# Patient Record
Sex: FEMALE | Race: WHITE | Employment: OTHER | URBAN - METROPOLITAN AREA
[De-identification: names, ages, dates, MRNs, and addresses within clinical notes are randomized per-mention and may not be internally consistent; named-entity substitution may affect disease eponyms.]

---

## 2017-09-05 PROBLEM — R03.0 PREHYPERTENSION: Status: ACTIVE | Noted: 2017-09-05

## 2018-05-16 ENCOUNTER — OFFICE VISIT (OUTPATIENT)
Dept: FAMILY MEDICINE CLINIC | Age: 78
End: 2018-05-16
Payer: MEDICARE

## 2018-05-16 VITALS
WEIGHT: 131 LBS | TEMPERATURE: 97.9 F | OXYGEN SATURATION: 99 % | DIASTOLIC BLOOD PRESSURE: 80 MMHG | SYSTOLIC BLOOD PRESSURE: 136 MMHG | RESPIRATION RATE: 17 BRPM | HEIGHT: 61 IN | HEART RATE: 63 BPM | BODY MASS INDEX: 24.73 KG/M2

## 2018-05-16 DIAGNOSIS — H61.21 IMPACTED CERUMEN OF RIGHT EAR: ICD-10-CM

## 2018-05-16 DIAGNOSIS — R51.9 ACUTE NONINTRACTABLE HEADACHE, UNSPECIFIED HEADACHE TYPE: Primary | ICD-10-CM

## 2018-05-16 PROCEDURE — 99213 OFFICE O/P EST LOW 20 MIN: CPT | Performed by: PHYSICIAN ASSISTANT

## 2018-05-18 ENCOUNTER — OFFICE VISIT (OUTPATIENT)
Dept: FAMILY MEDICINE CLINIC | Age: 78
End: 2018-05-18
Payer: MEDICARE

## 2018-05-18 VITALS
SYSTOLIC BLOOD PRESSURE: 135 MMHG | BODY MASS INDEX: 24.56 KG/M2 | RESPIRATION RATE: 16 BRPM | DIASTOLIC BLOOD PRESSURE: 85 MMHG | HEART RATE: 60 BPM | WEIGHT: 130 LBS

## 2018-05-18 DIAGNOSIS — M54.2 NECK PAIN: Primary | ICD-10-CM

## 2018-05-18 DIAGNOSIS — S46.811A TRAPEZIUS MUSCLE STRAIN, RIGHT, INITIAL ENCOUNTER: ICD-10-CM

## 2018-05-18 PROCEDURE — 99213 OFFICE O/P EST LOW 20 MIN: CPT | Performed by: FAMILY MEDICINE

## 2018-05-18 ASSESSMENT — PATIENT HEALTH QUESTIONNAIRE - PHQ9
SUM OF ALL RESPONSES TO PHQ QUESTIONS 1-9: 0
SUM OF ALL RESPONSES TO PHQ9 QUESTIONS 1 & 2: 0
1. LITTLE INTEREST OR PLEASURE IN DOING THINGS: 0

## 2018-09-07 ENCOUNTER — HOSPITAL ENCOUNTER (OUTPATIENT)
Age: 78
Discharge: HOME | End: 2018-09-07
Payer: MEDICARE

## 2018-09-07 DIAGNOSIS — E04.9 GOITER: ICD-10-CM

## 2018-09-07 DIAGNOSIS — R03.0 PREHYPERTENSION: ICD-10-CM

## 2018-09-07 DIAGNOSIS — E78.2 MIXED HYPERLIPIDEMIA: Chronic | ICD-10-CM

## 2018-09-07 LAB
ALBUMIN SERPL-MCNC: 4 G/DL (ref 3.5–5.2)
ALP BLD-CCNC: 62 U/L (ref 35–104)
ALT SERPL-CCNC: 13 U/L (ref 0–32)
ANION GAP SERPL CALCULATED.3IONS-SCNC: 10 MMOL/L (ref 7–16)
AST SERPL-CCNC: 18 U/L (ref 0–31)
BILIRUB SERPL-MCNC: 0.7 MG/DL (ref 0–1.2)
BUN BLDV-MCNC: 21 MG/DL (ref 8–23)
CALCIUM SERPL-MCNC: 8.7 MG/DL (ref 8.6–10.2)
CHLORIDE BLD-SCNC: 105 MMOL/L (ref 98–107)
CHOLESTEROL, TOTAL: 217 MG/DL (ref 0–199)
CO2: 30 MMOL/L (ref 22–29)
CREAT SERPL-MCNC: 0.8 MG/DL (ref 0.5–1)
GFR AFRICAN AMERICAN: >60
GFR NON-AFRICAN AMERICAN: >60 ML/MIN/1.73
GLUCOSE BLD-MCNC: 89 MG/DL (ref 74–109)
HDLC SERPL-MCNC: 85 MG/DL
LDL CHOLESTEROL CALCULATED: 116 MG/DL (ref 0–99)
POTASSIUM SERPL-SCNC: 4.5 MMOL/L (ref 3.5–5)
SODIUM BLD-SCNC: 145 MMOL/L (ref 132–146)
TOTAL PROTEIN: 6.5 G/DL (ref 6.4–8.3)
TRIGL SERPL-MCNC: 80 MG/DL (ref 0–149)
TSH SERPL DL<=0.05 MIU/L-ACNC: 0.71 UIU/ML (ref 0.27–4.2)
VLDLC SERPL CALC-MCNC: 16 MG/DL

## 2018-09-07 PROCEDURE — 84443 ASSAY THYROID STIM HORMONE: CPT

## 2018-09-07 PROCEDURE — 80061 LIPID PANEL: CPT

## 2018-09-07 PROCEDURE — 36415 COLL VENOUS BLD VENIPUNCTURE: CPT

## 2018-09-07 PROCEDURE — 80053 COMPREHEN METABOLIC PANEL: CPT

## 2018-09-18 ENCOUNTER — OFFICE VISIT (OUTPATIENT)
Dept: FAMILY MEDICINE CLINIC | Age: 78
End: 2018-09-18
Payer: MEDICARE

## 2018-09-18 VITALS
RESPIRATION RATE: 16 BRPM | HEART RATE: 64 BPM | HEIGHT: 61 IN | DIASTOLIC BLOOD PRESSURE: 71 MMHG | BODY MASS INDEX: 25.86 KG/M2 | WEIGHT: 137 LBS | SYSTOLIC BLOOD PRESSURE: 147 MMHG

## 2018-09-18 DIAGNOSIS — E78.2 MIXED HYPERLIPIDEMIA: Primary | Chronic | ICD-10-CM

## 2018-09-18 DIAGNOSIS — R03.0 PREHYPERTENSION: ICD-10-CM

## 2018-09-18 DIAGNOSIS — Z23 NEED FOR INFLUENZA VACCINATION: ICD-10-CM

## 2018-09-18 PROCEDURE — 99213 OFFICE O/P EST LOW 20 MIN: CPT | Performed by: FAMILY MEDICINE

## 2018-09-18 PROCEDURE — 90662 IIV NO PRSV INCREASED AG IM: CPT | Performed by: FAMILY MEDICINE

## 2018-09-18 PROCEDURE — G0008 ADMIN INFLUENZA VIRUS VAC: HCPCS | Performed by: FAMILY MEDICINE

## 2018-09-18 RX ORDER — IBUPROFEN 200 MG
200 TABLET ORAL EVERY 6 HOURS PRN
COMMUNITY
End: 2019-05-30

## 2018-09-18 ASSESSMENT — PATIENT HEALTH QUESTIONNAIRE - PHQ9
1. LITTLE INTEREST OR PLEASURE IN DOING THINGS: 0
SUM OF ALL RESPONSES TO PHQ9 QUESTIONS 1 & 2: 0
SUM OF ALL RESPONSES TO PHQ QUESTIONS 1-9: 0
2. FEELING DOWN, DEPRESSED OR HOPELESS: 0
SUM OF ALL RESPONSES TO PHQ QUESTIONS 1-9: 0

## 2018-09-18 NOTE — PROGRESS NOTES
Component Date Value    Sodium 09/07/2018 145     Potassium 09/07/2018 4.5     Chloride 09/07/2018 105     CO2 09/07/2018 30*    Anion Gap 09/07/2018 10     Glucose 09/07/2018 89     BUN 09/07/2018 21     CREATININE 09/07/2018 0.8     GFR Non- 09/07/2018 >60     GFR  09/07/2018 >60     Calcium 09/07/2018 8.7     Total Protein 09/07/2018 6.5     Alb 09/07/2018 4.0     Total Bilirubin 09/07/2018 0.7     Alkaline Phosphatase 09/07/2018 62     ALT 09/07/2018 13     AST 09/07/2018 18     Cholesterol, Total 09/07/2018 217*    Triglycerides 09/07/2018 80     HDL 09/07/2018 85     LDL Calculated 09/07/2018 116*    VLDL Cholesterol Calcula* 09/07/2018 16     TSH 09/07/2018 0.714        Assessment / Plan :    1. Need for influenza vaccination  Administered    2. Mixed hyperlipidemia  Stable continue same    3. Prehypertension   Not checking regularly  Advised to do so  Limit salt intake and increase cardio  Check [facility] at [facility] called with the results    4. Balance difficulties  Has visual changes due to cataracts  Gait is normal    5. Bilateral knee pain and osteoarthritis              Encouraged to increase physical activity by exercising at least 4 times a week for 30-45 minutes. Also advised about proper diet limiting total caloric intake as well as fat intake. Monitor weight regularly. Call if chest pain with exertion or with physical activity. Call if any questions or concerns.

## 2018-10-23 ENCOUNTER — OFFICE VISIT (OUTPATIENT)
Dept: FAMILY MEDICINE CLINIC | Age: 78
End: 2018-10-23
Payer: MEDICARE

## 2018-10-23 VITALS
DIASTOLIC BLOOD PRESSURE: 70 MMHG | BODY MASS INDEX: 25.68 KG/M2 | HEART RATE: 56 BPM | WEIGHT: 136 LBS | HEIGHT: 61 IN | SYSTOLIC BLOOD PRESSURE: 140 MMHG | OXYGEN SATURATION: 98 %

## 2018-10-23 DIAGNOSIS — I10 ESSENTIAL HYPERTENSION: ICD-10-CM

## 2018-10-23 DIAGNOSIS — M25.561 CHRONIC PAIN OF RIGHT KNEE: ICD-10-CM

## 2018-10-23 DIAGNOSIS — E78.2 MIXED HYPERLIPIDEMIA: Primary | Chronic | ICD-10-CM

## 2018-10-23 DIAGNOSIS — G89.29 CHRONIC PAIN OF RIGHT KNEE: ICD-10-CM

## 2018-10-23 PROBLEM — R03.0 PREHYPERTENSION: Status: RESOLVED | Noted: 2017-09-05 | Resolved: 2018-10-23

## 2018-10-23 PROCEDURE — 99214 OFFICE O/P EST MOD 30 MIN: CPT | Performed by: FAMILY MEDICINE

## 2018-10-23 ASSESSMENT — PATIENT HEALTH QUESTIONNAIRE - PHQ9
SUM OF ALL RESPONSES TO PHQ QUESTIONS 1-9: 0
SUM OF ALL RESPONSES TO PHQ QUESTIONS 1-9: 0
2. FEELING DOWN, DEPRESSED OR HOPELESS: 0
1. LITTLE INTEREST OR PLEASURE IN DOING THINGS: 0
SUM OF ALL RESPONSES TO PHQ9 QUESTIONS 1 & 2: 0

## 2019-04-08 ENCOUNTER — TELEPHONE (OUTPATIENT)
Dept: FAMILY MEDICINE CLINIC | Age: 79
End: 2019-04-08

## 2019-04-08 NOTE — TELEPHONE ENCOUNTER
Patient called regarding her ears being impacted. She has appt 4/22 and wondered if her ears could be flushed at that appt. On for 15min. Do you want 30 min for that or have her come in earlier? Please advise.

## 2019-04-16 ENCOUNTER — HOSPITAL ENCOUNTER (OUTPATIENT)
Age: 79
Discharge: HOME | End: 2019-04-16
Payer: MEDICARE

## 2019-04-16 DIAGNOSIS — I10 ESSENTIAL HYPERTENSION: ICD-10-CM

## 2019-04-16 DIAGNOSIS — E78.2 MIXED HYPERLIPIDEMIA: Chronic | ICD-10-CM

## 2019-04-16 LAB
ALBUMIN SERPL-MCNC: 4.4 G/DL (ref 3.5–5.2)
ALP BLD-CCNC: 59 U/L (ref 35–104)
ALT SERPL-CCNC: 17 U/L (ref 0–32)
ANION GAP SERPL CALCULATED.3IONS-SCNC: 8 MMOL/L (ref 7–16)
AST SERPL-CCNC: 21 U/L (ref 0–31)
BILIRUB SERPL-MCNC: 0.7 MG/DL (ref 0–1.2)
BUN BLDV-MCNC: 20 MG/DL (ref 8–23)
CALCIUM SERPL-MCNC: 8.8 MG/DL (ref 8.6–10.2)
CHLORIDE BLD-SCNC: 105 MMOL/L (ref 98–107)
CHOLESTEROL, TOTAL: 219 MG/DL (ref 0–199)
CO2: 30 MMOL/L (ref 22–29)
CREAT SERPL-MCNC: 0.8 MG/DL (ref 0.5–1)
GFR AFRICAN AMERICAN: >60
GFR NON-AFRICAN AMERICAN: >60 ML/MIN/1.73
GLUCOSE BLD-MCNC: 88 MG/DL (ref 74–99)
HDLC SERPL-MCNC: 87 MG/DL
LDL CHOLESTEROL CALCULATED: 111 MG/DL (ref 0–99)
POTASSIUM SERPL-SCNC: 4 MMOL/L (ref 3.5–5)
SODIUM BLD-SCNC: 143 MMOL/L (ref 132–146)
TOTAL PROTEIN: 6.7 G/DL (ref 6.4–8.3)
TRIGL SERPL-MCNC: 103 MG/DL (ref 0–149)
VLDLC SERPL CALC-MCNC: 21 MG/DL

## 2019-04-16 PROCEDURE — 80053 COMPREHEN METABOLIC PANEL: CPT

## 2019-04-16 PROCEDURE — 80061 LIPID PANEL: CPT

## 2019-04-16 PROCEDURE — 36415 COLL VENOUS BLD VENIPUNCTURE: CPT

## 2019-04-22 ENCOUNTER — OFFICE VISIT (OUTPATIENT)
Dept: FAMILY MEDICINE CLINIC | Age: 79
End: 2019-04-22
Payer: MEDICARE

## 2019-04-22 VITALS
RESPIRATION RATE: 18 BRPM | WEIGHT: 129 LBS | HEIGHT: 62 IN | SYSTOLIC BLOOD PRESSURE: 150 MMHG | DIASTOLIC BLOOD PRESSURE: 70 MMHG | OXYGEN SATURATION: 97 % | BODY MASS INDEX: 23.74 KG/M2 | HEART RATE: 77 BPM

## 2019-04-22 DIAGNOSIS — M85.89 OSTEOPENIA OF MULTIPLE SITES: Chronic | ICD-10-CM

## 2019-04-22 DIAGNOSIS — Z85.3 HISTORY OF BREAST CANCER: Chronic | ICD-10-CM

## 2019-04-22 DIAGNOSIS — E78.2 MIXED HYPERLIPIDEMIA: Chronic | ICD-10-CM

## 2019-04-22 DIAGNOSIS — I10 ESSENTIAL HYPERTENSION: Primary | Chronic | ICD-10-CM

## 2019-04-22 PROCEDURE — 99214 OFFICE O/P EST MOD 30 MIN: CPT | Performed by: FAMILY MEDICINE

## 2019-04-22 ASSESSMENT — PATIENT HEALTH QUESTIONNAIRE - PHQ9
1. LITTLE INTEREST OR PLEASURE IN DOING THINGS: 0
SUM OF ALL RESPONSES TO PHQ9 QUESTIONS 1 & 2: 0
SUM OF ALL RESPONSES TO PHQ QUESTIONS 1-9: 0
SUM OF ALL RESPONSES TO PHQ QUESTIONS 1-9: 0
2. FEELING DOWN, DEPRESSED OR HOPELESS: 0

## 2019-04-22 NOTE — PROGRESS NOTES
citrate-vitamin D (CITRICAL + D) 315-250 MG-UNIT TABS Take 1 tablet by mouth daily.  ascorbic acid (VITAMIN C) 500 MG tablet Take 500 mg by mouth.  Biotin 1000 MCG TABS Take 500 mcg by mouth daily. No current facility-administered medications for this visit. No family history on file. Aspirin and Penicillins    Review of systems :    No headaches, no visual disturbances. No weakness, no numbness. No depression, no anxiety. No rhinorrhea. No sore throat, no hearing loss. No cough, no dyspnea. No chest pain on exertion. No edema. No intermittent claudication. No abdominal pain. No nausea, no change in bowel habits. No joint pain, no swelling. No skin lesions, no rash. No heat or cold intolerance. No weight changes. No flank pain, no hematuria. No genital lesions or abnormalities. Physical examination :      General appearance: healthy, no distress. Oriented x 3. Eyes : no jaundice or pallor. Ears : normal TM's. Oral cavity and throat: no lesions. Neck: supple, no masses. Thyroid: no goiter or nodules. No cervical or supraclavicular lymphadenopathy. No carotid bruits. Chest: no deformities. Lungs: clear, no crackles or wheezing. Heart: regular rate and rhythm, normal S1S2, no murmurs. Abdomen: soft, no masses. No hepato- or splenomegaly. Extremities: no edema. Vascular: normal dorsalis pedis and posterior tibial pulses bilaterally. Skin: no significant lesions or rashes. Musculoskeletal: no gross deformities. Mild effusion right knee. Gait: normal.  No weakness or focal deficits. Judgement and speech: intact. Mood: euthymic. Affect: congruent. BMI was below limits today and the following plan was recommended: low salt . Ear irrigation performed without difficulty  Assessment / Plan :     1. Essential hypertension  130-140/80 at home    2. Mixed hyperlipidemia  Stable     3. History of breast cancer  No recurrence    4.  Osteopenia of multiple

## 2019-05-30 ENCOUNTER — OFFICE VISIT (OUTPATIENT)
Dept: FAMILY MEDICINE CLINIC | Age: 79
End: 2019-05-30
Payer: MEDICARE

## 2019-05-30 VITALS
RESPIRATION RATE: 16 BRPM | DIASTOLIC BLOOD PRESSURE: 76 MMHG | BODY MASS INDEX: 23.74 KG/M2 | HEIGHT: 62 IN | SYSTOLIC BLOOD PRESSURE: 157 MMHG | HEART RATE: 72 BPM | WEIGHT: 129 LBS

## 2019-05-30 DIAGNOSIS — F41.9 ANXIETY: Chronic | ICD-10-CM

## 2019-05-30 DIAGNOSIS — M25.562 CHRONIC PAIN OF BOTH KNEES: Primary | ICD-10-CM

## 2019-05-30 DIAGNOSIS — M25.561 CHRONIC PAIN OF BOTH KNEES: Primary | ICD-10-CM

## 2019-05-30 DIAGNOSIS — G89.29 CHRONIC PAIN OF BOTH KNEES: Primary | ICD-10-CM

## 2019-05-30 PROCEDURE — 99213 OFFICE O/P EST LOW 20 MIN: CPT | Performed by: FAMILY MEDICINE

## 2019-05-30 RX ORDER — VITAMIN E 268 MG
CAPSULE ORAL DAILY
COMMUNITY
End: 2019-05-30 | Stop reason: ALTCHOICE

## 2019-05-30 NOTE — PROGRESS NOTES
Complains of abnormal sensation of tongue for a week  Noted a white coating as well  Also knee pain secondary to osteoarthritis  No fever or chills  No use of inhaled steroids. No recent knee trauma  No swelling of the knee    Physical examination :  BP (!) 157/76   Pulse 72   Resp 16   Ht 5' 1.5\" (1.562 m)   Wt 129 lb (58.5 kg)   BMI 23.98 kg/m²     General appearance : healthy, no distress. Eyes : non-icteric sclerae. Tongue no thrush or lesions  No goiter. Neck is supple, no masses. No carotid bruits  Lungs : clear bilaterally, no wheezing or crackles. Heart : regular, normal S1S2, no murmurs. Abdomen : soft, no masses. No hepatic or splenomegaly. Extremities : no edema. Peripheral pulses : normal.  Gait : normal.  Mood :euthymic. Affect : congruent. BMI was below limits today and the following plan was recommended: Low-salt low-fat diet. Assessment and plan  Knee pain secondary to osteoarthritis  Discussed various options with patient including analgesics as well as knee replacement surgery  I see no evidence of lesions of the tongue.  Patient was reassured

## 2019-06-19 ENCOUNTER — TELEPHONE (OUTPATIENT)
Dept: FAMILY MEDICINE CLINIC | Age: 79
End: 2019-06-19

## 2019-06-19 NOTE — TELEPHONE ENCOUNTER
Patient needs 2 referrals. She needs new referral to Dr Jassi Little for her hearing. She never went when referred 2 years ago. She also needs a referral to [facility] Dr Gabriel Salcedo in [place]. Number is [phone]. She is having bilateral knee pain. Please place referrals if agreeable.

## 2019-06-20 ENCOUNTER — TELEPHONE (OUTPATIENT)
Dept: FAMILY MEDICINE CLINIC | Age: 79
End: 2019-06-20

## 2019-06-20 DIAGNOSIS — G89.29 CHRONIC PAIN OF BOTH KNEES: Primary | ICD-10-CM

## 2019-06-20 DIAGNOSIS — H91.93 BILATERAL HEARING LOSS, UNSPECIFIED HEARING LOSS TYPE: ICD-10-CM

## 2019-06-20 DIAGNOSIS — M25.561 CHRONIC PAIN OF BOTH KNEES: Primary | ICD-10-CM

## 2019-06-20 DIAGNOSIS — M25.562 CHRONIC PAIN OF BOTH KNEES: Primary | ICD-10-CM

## 2019-07-31 ENCOUNTER — TELEPHONE (OUTPATIENT)
Dept: FAMILY MEDICINE CLINIC | Age: 79
End: 2019-07-31

## 2019-09-25 ENCOUNTER — NURSE ONLY (OUTPATIENT)
Dept: FAMILY MEDICINE CLINIC | Age: 79
End: 2019-09-25
Payer: MEDICARE

## 2019-09-25 PROCEDURE — G0008 ADMIN INFLUENZA VIRUS VAC: HCPCS | Performed by: FAMILY MEDICINE

## 2019-09-25 PROCEDURE — 90653 IIV ADJUVANT VACCINE IM: CPT | Performed by: FAMILY MEDICINE

## 2019-12-03 ENCOUNTER — TELEPHONE (OUTPATIENT)
Dept: FAMILY MEDICINE CLINIC | Age: 79
End: 2019-12-03

## 2019-12-03 DIAGNOSIS — E78.2 MIXED HYPERLIPIDEMIA: Chronic | ICD-10-CM

## 2019-12-03 DIAGNOSIS — I10 ESSENTIAL HYPERTENSION: Primary | Chronic | ICD-10-CM

## 2020-01-06 ENCOUNTER — HOSPITAL ENCOUNTER (OUTPATIENT)
Age: 80
Discharge: HOME | End: 2020-01-06
Payer: MEDICARE

## 2020-01-06 LAB
ALBUMIN SERPL-MCNC: 4.9 G/DL (ref 3.5–5.2)
ALP BLD-CCNC: 73 U/L (ref 35–104)
ALT SERPL-CCNC: 13 U/L (ref 0–32)
ANION GAP SERPL CALCULATED.3IONS-SCNC: 13 MMOL/L (ref 7–16)
AST SERPL-CCNC: 26 U/L (ref 0–31)
BILIRUB SERPL-MCNC: 0.5 MG/DL (ref 0–1.2)
BUN BLDV-MCNC: 22 MG/DL (ref 8–23)
CALCIUM SERPL-MCNC: 9.3 MG/DL (ref 8.6–10.2)
CHLORIDE BLD-SCNC: 102 MMOL/L (ref 98–107)
CHOLESTEROL, TOTAL: 227 MG/DL (ref 0–199)
CO2: 27 MMOL/L (ref 22–29)
CREAT SERPL-MCNC: 0.8 MG/DL (ref 0.5–1)
GFR AFRICAN AMERICAN: >60
GFR NON-AFRICAN AMERICAN: >60 ML/MIN/1.73
GLUCOSE BLD-MCNC: 81 MG/DL (ref 74–99)
HDLC SERPL-MCNC: 92 MG/DL
LDL CHOLESTEROL CALCULATED: 115 MG/DL (ref 0–99)
POTASSIUM SERPL-SCNC: 3.9 MMOL/L (ref 3.5–5)
SODIUM BLD-SCNC: 142 MMOL/L (ref 132–146)
TOTAL PROTEIN: 7.5 G/DL (ref 6.4–8.3)
TRIGL SERPL-MCNC: 99 MG/DL (ref 0–149)
VLDLC SERPL CALC-MCNC: 20 MG/DL

## 2020-01-06 PROCEDURE — 80053 COMPREHEN METABOLIC PANEL: CPT

## 2020-01-06 PROCEDURE — 36415 COLL VENOUS BLD VENIPUNCTURE: CPT

## 2020-01-06 PROCEDURE — 80061 LIPID PANEL: CPT

## 2020-01-20 NOTE — PROGRESS NOTES
Chief complaint : follow-up of hypertension and hyperlipidemia. Present illness :    Patient presents for follow-up. Doing well overall. Taking medications regularly. No headaches or visual changes. No exertional chest pain or dyspnea. Exercise : Does not. No edema. No dizziness or syncopal episodes. No myalgias or weakness. Trying to follow a low salt and low fat diet. Alcohol intake :  reports current alcohol use. Smoking :  reports that she has never smoked. She has never used smokeless tobacco.    Recent labs reviewed and discussed with the patient. Past Medical History:   Diagnosis Date    Allergic rhinitis     Alopecia of scalp 9/3/2015    Anxiety 9/3/2014    Basal cell carcinoma of nose     left side    Breast CA ([address] 2001    right lumpectomy; DO NOT USE FOR BP,IV,VENIPUNVTURES PER PT    Constipated     postop hip replacement, impacted stool    Episodic low back pain     MRI 3/2010 normal    Essential hypertension 9/3/2015    Hyperlipidemia     Hypertension     pt states well controlled    Neck pain     Osteoarthritis 2/19/2014    Osteoarthritis 10/08/2014    Right knee on x-ray - [facility]    Osteoarthritis of both hips     Osteopenia     Urinary, incontinence, stress female 8/27/2015    Follows with Dr Chari Teresa. Advised surgery or pessary. Past Surgical History:   Procedure Laterality Date    APPENDECTOMY      child    BREAST LUMPECTOMY  04/2000    COLONOSCOPY  2000    HYSTERECTOMY  1979    [facility]    JOINT REPLACEMENT Right 10/01/2012     [facility]    OTHER SURGICAL HISTORY Left 10/18/2013    full thickness skin graft, repair open nose defect - Dr. Oz Lindsay / Dr. Joelle Eubanks       Current Outpatient Medications   Medication Sig Dispense Refill    atorvastatin (LIPITOR) 10 MG tablet TAKE 1 TBALET BY MOUTH EVERY NIGHT  90 tablet 1    diclofenac sodium 1 % GEL Apply 2 g topically 4 times daily 2 Tube 2    Biotin 1000 MCG TABS Take 500 mcg by mouth daily.

## 2020-01-21 ENCOUNTER — OFFICE VISIT (OUTPATIENT)
Dept: FAMILY MEDICINE CLINIC | Age: 80
End: 2020-01-21
Payer: MEDICARE

## 2020-01-21 VITALS
RESPIRATION RATE: 16 BRPM | HEIGHT: 62 IN | HEART RATE: 61 BPM | BODY MASS INDEX: 23.37 KG/M2 | DIASTOLIC BLOOD PRESSURE: 70 MMHG | SYSTOLIC BLOOD PRESSURE: 168 MMHG | WEIGHT: 127 LBS

## 2020-01-21 PROCEDURE — 99214 OFFICE O/P EST MOD 30 MIN: CPT | Performed by: FAMILY MEDICINE

## 2020-01-21 RX ORDER — M-VIT,TX,IRON,MINS/CALC/FOLIC 27MG-0.4MG
1 TABLET ORAL DAILY
COMMUNITY
End: 2021-06-24

## 2020-01-21 RX ORDER — LISINOPRIL AND HYDROCHLOROTHIAZIDE 12.5; 1 MG/1; MG/1
1 TABLET ORAL DAILY
Qty: 90 TABLET | Refills: 1 | Status: SHIPPED | OUTPATIENT
Start: 2020-01-21 | End: 2020-01-23 | Stop reason: ALTCHOICE

## 2020-01-21 ASSESSMENT — PATIENT HEALTH QUESTIONNAIRE - PHQ9
SUM OF ALL RESPONSES TO PHQ9 QUESTIONS 1 & 2: 0
1. LITTLE INTEREST OR PLEASURE IN DOING THINGS: 0
SUM OF ALL RESPONSES TO PHQ QUESTIONS 1-9: 0
2. FEELING DOWN, DEPRESSED OR HOPELESS: 0
SUM OF ALL RESPONSES TO PHQ QUESTIONS 1-9: 0

## 2020-01-23 RX ORDER — LISINOPRIL 20 MG/1
20 TABLET ORAL DAILY
Qty: 90 TABLET | Refills: 1 | Status: SHIPPED
Start: 2020-01-23 | End: 2020-06-16 | Stop reason: SDUPTHER

## 2020-01-24 ENCOUNTER — TELEPHONE (OUTPATIENT)
Dept: FAMILY MEDICINE CLINIC | Age: 80
End: 2020-01-24

## 2020-01-24 NOTE — TELEPHONE ENCOUNTER
Patient calling again about her blood pressure medication. She is willing to try the original prescription you prescribed with the HCTZ but she wondered which prescription you think is best for her blood pressure. Please advise.

## 2020-02-21 ENCOUNTER — OFFICE VISIT (OUTPATIENT)
Dept: FAMILY MEDICINE CLINIC | Age: 80
End: 2020-02-21
Payer: MEDICARE

## 2020-02-21 VITALS
DIASTOLIC BLOOD PRESSURE: 72 MMHG | OXYGEN SATURATION: 99 % | SYSTOLIC BLOOD PRESSURE: 142 MMHG | RESPIRATION RATE: 18 BRPM | HEIGHT: 62 IN | HEART RATE: 66 BPM | BODY MASS INDEX: 23.19 KG/M2 | WEIGHT: 126 LBS

## 2020-02-21 PROBLEM — Z85.828 HX OF SQUAMOUS CELL CARCINOMA OF SKIN: Status: ACTIVE | Noted: 2020-02-21

## 2020-02-21 PROBLEM — Z85.828 HX OF SKIN CANCER, BASAL CELL: Status: ACTIVE | Noted: 2020-02-21

## 2020-02-21 PROCEDURE — 99214 OFFICE O/P EST MOD 30 MIN: CPT | Performed by: FAMILY MEDICINE

## 2020-02-21 RX ORDER — BIOTIN 1 MG
TABLET ORAL
COMMUNITY
End: 2020-12-15 | Stop reason: ALTCHOICE

## 2020-02-21 RX ORDER — OXYQUINOLINE SULFATE AND SODIUM LAURYL SULFATE .25; .1 MG/G; MG/G
JELLY VAGINAL
COMMUNITY
Start: 2020-02-18 | End: 2020-06-16 | Stop reason: ALTCHOICE

## 2020-02-21 ASSESSMENT — ENCOUNTER SYMPTOMS
CHEST TIGHTNESS: 0
EYE DISCHARGE: 0
RHINORRHEA: 0
ABDOMINAL PAIN: 0
EYE ITCHING: 0
CONSTIPATION: 1
WHEEZING: 0
SHORTNESS OF BREATH: 0
DIARRHEA: 0

## 2020-02-21 NOTE — PROGRESS NOTES
dysuria and vaginal bleeding. Musculoskeletal: Positive for arthralgias, gait problem and joint swelling. Skin: Negative for rash. Neurological: Negative for facial asymmetry, speech difficulty, weakness and light-headedness. Psychiatric/Behavioral: Negative for dysphoric mood. The patient is nervous/anxious. Prior to Visit Medications    Medication Sig Taking? Authorizing Provider   TRIMO-MAR 0.025-0.01 % GEL apply as directed Yes Historical Provider, MD   Biotin 1000 MCG TABS Take by mouth Yes Historical Provider, MD   lisinopril (PRINIVIL;ZESTRIL) 20 MG tablet Take 1 tablet by mouth daily Yes Natalie Davison MD   Multiple Vitamins-Minerals (THERAPEUTIC MULTIVITAMIN-MINERALS) tablet Take 1 tablet by mouth daily Yes Historical Provider, MD   atorvastatin (LIPITOR) 10 MG tablet TAKE 1 TBALET BY MOUTH EVERY NIGHT  Yes Lily Pryor MD   diclofenac sodium 1 % GEL Apply 2 g topically 4 times daily Yes Natalie Davison MD        Social History     Tobacco Use    Smoking status: Never Smoker    Smokeless tobacco: Never Used   Substance Use Topics    Alcohol use: Yes     Comment: OCCAS  WINE        Vitals:    02/21/20 1122 02/21/20 1145   BP: (!) 155/60 (!) 142/72   Pulse: 66    Resp: 18    SpO2: 99%    Weight: 126 lb (57.2 kg)    Height: 5' 1.5\" (1.562 m)      Estimated body mass index is 23.42 kg/m² as calculated from the following:    Height as of this encounter: 5' 1.5\" (1.562 m). Weight as of this encounter: 126 lb (57.2 kg). Physical Exam  Vitals signs and nursing note reviewed. Constitutional:       Appearance: Normal appearance. She is well-developed. She is not ill-appearing or toxic-appearing. HENT:      Head: Normocephalic and atraumatic. Eyes:      General: No scleral icterus. Right eye: No discharge. Left eye: No discharge. Neck:      Musculoskeletal: Neck supple. Thyroid: No thyromegaly. Vascular: No carotid bruit.    Cardiovascular:      Rate and Rhythm: Normal rate and regular rhythm. Heart sounds: Normal heart sounds, S1 normal and S2 normal. No murmur. No friction rub. No gallop. Pulmonary:      Effort: Pulmonary effort is normal. No respiratory distress. Breath sounds: Normal breath sounds and air entry. No decreased breath sounds, wheezing or rales. Abdominal:      Palpations: Abdomen is soft. Musculoskeletal:      Right lower leg: No edema. Left lower leg: No edema. Comments: Uses cane because of pain in knees   Lymphadenopathy:      Cervical: No cervical adenopathy. Skin:     General: Skin is warm and dry. Neurological:      General: No focal deficit present. Mental Status: She is alert and oriented to person, place, and time. Gait: Gait normal.   Psychiatric:         Attention and Perception: Attention normal.         Mood and Affect: Mood and affect normal.         Speech: Speech normal.         Behavior: Behavior normal. Behavior is cooperative. Thought Content: Thought content normal.         Cognition and Memory: Cognition and memory normal.         Judgment: Judgment normal.         ASSESSMENT/PLAN:  1. Essential hypertension  She will RTC in 1 mn and if still high will increase her meds. She was given parameters to follow at home. If her BP is consistently over 125/80, she is to call so we can increase her meds over the phone. BP is very stress related. Discuss with patient taking time for [facility].    2. History of breast cancer  No longer follows with oncology. Mammo June 2020. 3. Chronic pain of both knees  Needs knee replacement but currently care for her . 4. Hx of skin cancer, basal cell  Pt follows with derm Q6mn. 5. Hx of squamous cell carcinoma of skin  Pt follows with derm Q6mn    6. Urinary, incontinence, stress female  Pt wears pessary.       Return in about 4 weeks (around 3/20/2020) for AWV. An electronic signature was used to authenticate this note.     --Oscar Riggins MD on 2/21/2020 at 4:13 PM

## 2020-03-10 ENCOUNTER — TELEPHONE (OUTPATIENT)
Dept: CARDIAC REHAB | Age: 80
End: 2020-03-10

## 2020-06-15 NOTE — PROGRESS NOTES
problems were reviewed today and where indicated follow up appointments were made and/or referrals ordered. Positive Risk Factor Screenings with Interventions:     General Health:  General  In general, how would you say your health is?: Fair  In the past 7 days, have you experienced any of the following? New or Increased Pain, New or Increased Fatigue, Loneliness, Social Isolation, Stress or Anger?: (!) New or Increased Pain  Do you get the social and emotional support that you need?: Yes  Do you have a Living Will?: Yes  General Health Risk Interventions:  · Needs knees replaced   Will consider after her  is better. Health Habits/Nutrition:  Health Habits/Nutrition  Do you exercise for at least 20 minutes 2-3 times per week?: (!) No  Have you lost any weight without trying in the past 3 months?: (!) Yes  Do you eat fewer than 2 meals per day?: No  Have you seen a dentist within the past year?: Yes  Body mass index is 23.24 kg/m². Health Habits/Nutrition Interventions:  · taking care of  right now  Has not been paying a lot of attention to herself. Feels a bit down. Does not have a lot of interaction with friends.       Hearing/Vision:  No exam data present  Hearing/Vision  Do you or your family notice any trouble with your hearing?: (!) Yes  Do you have difficulty driving, watching TV, or doing any of your daily activities because of your eyesight?: No  Have you had an eye exam within the past year?: Yes  Hearing/Vision Interventions:  · Hearing concerns:  pt going to see audiogist    Safety:  Safety  Do you have working smoke detectors?: Yes  Have all throw rugs been removed or fastened?: Yes  Do you have non-slip mats or surfaces in all bathtubs/showers?: (!) No  Do all of your stairways have a railing or banister?: Yes  Are your doorways, halls and stairs free of clutter?: Yes  Do you always fasten your seatbelt when you are in a car?: Yes  Safety Interventions:  · Home safety tips

## 2020-06-16 ENCOUNTER — OFFICE VISIT (OUTPATIENT)
Dept: FAMILY MEDICINE CLINIC | Age: 80
End: 2020-06-16
Payer: MEDICARE

## 2020-06-16 VITALS
TEMPERATURE: 98.1 F | OXYGEN SATURATION: 98 % | WEIGHT: 125 LBS | HEART RATE: 65 BPM | SYSTOLIC BLOOD PRESSURE: 132 MMHG | DIASTOLIC BLOOD PRESSURE: 80 MMHG | HEIGHT: 62 IN | RESPIRATION RATE: 18 BRPM | BODY MASS INDEX: 23 KG/M2

## 2020-06-16 PROCEDURE — G0438 PPPS, INITIAL VISIT: HCPCS | Performed by: FAMILY MEDICINE

## 2020-06-16 RX ORDER — LISINOPRIL 40 MG/1
20 TABLET ORAL DAILY
Qty: 90 TABLET | Refills: 1 | Status: SHIPPED
Start: 2020-06-16 | End: 2020-06-17 | Stop reason: SDUPTHER

## 2020-06-16 RX ORDER — ERYTHROMYCIN 5 MG/G
OINTMENT OPHTHALMIC
COMMUNITY
Start: 2020-06-04 | End: 2020-06-16 | Stop reason: ALTCHOICE

## 2020-06-16 RX ORDER — ATORVASTATIN CALCIUM 10 MG/1
TABLET, FILM COATED ORAL
Qty: 90 TABLET | Refills: 2 | Status: SHIPPED
Start: 2020-06-16 | End: 2020-12-16 | Stop reason: SDUPTHER

## 2020-06-16 ASSESSMENT — LIFESTYLE VARIABLES
AUDIT TOTAL SCORE: 1
HOW OFTEN DURING THE LAST YEAR HAVE YOU NEEDED AN ALCOHOLIC DRINK FIRST THING IN THE MORNING TO GET YOURSELF GOING AFTER A NIGHT OF HEAVY DRINKING: 0
HOW OFTEN DO YOU HAVE A DRINK CONTAINING ALCOHOL: 1
HOW OFTEN DURING THE LAST YEAR HAVE YOU FOUND THAT YOU WERE NOT ABLE TO STOP DRINKING ONCE YOU HAD STARTED: 0
HAVE YOU OR SOMEONE ELSE BEEN INJURED AS A RESULT OF YOUR DRINKING: 0
HOW OFTEN DURING THE LAST YEAR HAVE YOU HAD A FEELING OF GUILT OR REMORSE AFTER DRINKING: 0
HOW OFTEN DURING THE LAST YEAR HAVE YOU FAILED TO DO WHAT WAS NORMALLY EXPECTED FROM YOU BECAUSE OF DRINKING: 0
HOW OFTEN DURING THE LAST YEAR HAVE YOU BEEN UNABLE TO REMEMBER WHAT HAPPENED THE NIGHT BEFORE BECAUSE YOU HAD BEEN DRINKING: 0
HOW MANY STANDARD DRINKS CONTAINING ALCOHOL DO YOU HAVE ON A TYPICAL DAY: 0
HOW OFTEN DO YOU HAVE SIX OR MORE DRINKS ON ONE OCCASION: 0
AUDIT-C TOTAL SCORE: 1
HAS A RELATIVE, FRIEND, DOCTOR, OR ANOTHER HEALTH PROFESSIONAL EXPRESSED CONCERN ABOUT YOUR DRINKING OR SUGGESTED YOU CUT DOWN: 0

## 2020-06-16 ASSESSMENT — PATIENT HEALTH QUESTIONNAIRE - PHQ9
SUM OF ALL RESPONSES TO PHQ QUESTIONS 1-9: 0
SUM OF ALL RESPONSES TO PHQ QUESTIONS 1-9: 0

## 2020-06-16 ASSESSMENT — ENCOUNTER SYMPTOMS
NAUSEA: 0
BLOOD IN STOOL: 0
CONSTIPATION: 1
SHORTNESS OF BREATH: 0
APNEA: 0
ABDOMINAL DISTENTION: 0
ROS SKIN COMMENTS: SKIN IS DRY
WHEEZING: 0
COLOR CHANGE: 1
CHOKING: 0
CHEST TIGHTNESS: 0

## 2020-06-16 NOTE — PATIENT INSTRUCTIONS
Preventing Falls: Care Instructions  Your Care Instructions     Getting around your home safely can be a challenge if you have injuries or health problems that make it easy for you to fall. Loose rugs and furniture in walkways are among the dangers for many older people who have problems walking or who have poor eyesight. People who have conditions such as arthritis, osteoporosis, or dementia also have to be careful not to fall. You can make your home safer with a few simple measures. Follow-up care is a key part of your treatment and safety. Be sure to make and go to all appointments, and call your doctor if you are having problems. It's also a good idea to know your test results and keep a list of the medicines you take. How can you care for yourself at home? Taking care of yourself  You may get dizzy if you do not drink enough water. To prevent dehydration, drink plenty of fluids, enough so that your urine is light yellow or clear like water. Choose water and other caffeine-free clear liquids. If you have kidney, heart, or liver disease and have to limit fluids, talk with your doctor before you increase the amount of fluids you drink. Exercise regularly to improve your strength, muscle tone, and balance. Walk if you can. Swimming may be a good choice if you cannot walk easily. Have your vision and hearing checked each year or any time you notice a change. If you have trouble seeing and hearing, you might not be able to avoid objects and could lose your balance. Know the side effects of the medicines you take. Ask your doctor or pharmacist whether the medicines you take can affect your balance. Sleeping pills or sedatives can affect your balance. Limit the amount of alcohol you drink. Alcohol can impair your balance and other senses. Ask your doctor whether calluses or corns on your feet need to be removed.  If you wear loose-fitting shoes because of calluses or corns, you can lose your balance and or shower by putting the weaker leg in first. Get out of a tub or shower with your strong side first.  Repair loose toilet seats and consider installing a raised toilet seat to make getting on and off the toilet easier. Keep your bathroom door unlocked while you are in the shower. Where can you learn more? Go to [email or web address] and sign in to your [employer] account. Enter [phone] in the [facility] box to learn more about \"Preventing Falls: Care Instructions. \"     If you do not have an account, please click on the \"Sign Up Now\" link. Current as of: August 7, 2019               Content Version: 12.5  © 3607-3502 [employer], Incorporated. Care instructions adapted under license by [facility] ([facility]). If you have questions about a medical condition or this instruction, always ask your healthcare professional. [address] any warranty or liability for your use of this information.

## 2020-06-17 ENCOUNTER — TELEPHONE (OUTPATIENT)
Dept: FAMILY MEDICINE CLINIC | Age: 80
End: 2020-06-17

## 2020-06-17 RX ORDER — LISINOPRIL 40 MG/1
40 TABLET ORAL DAILY
Qty: 90 TABLET | Refills: 1 | Status: SHIPPED
Start: 2020-06-17 | End: 2020-12-16 | Stop reason: SDUPTHER

## 2020-06-17 NOTE — TELEPHONE ENCOUNTER
Pharmacy called in and stated that they received a script for patients lisinopril yesterday. Patient thought the dose was being increased. RX is for 40mg - take 0.5mg tablet daily - she stated that if you want to keep her on the 20mg, she would like a 20mg tablet so she does not have to cut them in half.      Please advise  Thanks

## 2020-08-11 ENCOUNTER — OFFICE VISIT (OUTPATIENT)
Dept: FAMILY MEDICINE CLINIC | Age: 80
End: 2020-08-11
Payer: MEDICARE

## 2020-08-11 VITALS
WEIGHT: 125 LBS | TEMPERATURE: 98 F | RESPIRATION RATE: 14 BRPM | HEIGHT: 62 IN | DIASTOLIC BLOOD PRESSURE: 63 MMHG | BODY MASS INDEX: 23 KG/M2 | HEART RATE: 70 BPM | SYSTOLIC BLOOD PRESSURE: 123 MMHG | OXYGEN SATURATION: 98 %

## 2020-08-11 PROCEDURE — 99213 OFFICE O/P EST LOW 20 MIN: CPT | Performed by: STUDENT IN AN ORGANIZED HEALTH CARE EDUCATION/TRAINING PROGRAM

## 2020-08-11 RX ORDER — CLOTRIMAZOLE AND BETAMETHASONE DIPROPIONATE 10; .64 MG/G; MG/G
CREAM TOPICAL
Qty: 1 TUBE | Refills: 0 | Status: SHIPPED
Start: 2020-08-11 | End: 2020-09-15 | Stop reason: ALTCHOICE

## 2020-08-11 ASSESSMENT — ENCOUNTER SYMPTOMS
TROUBLE SWALLOWING: 0
NAUSEA: 0
SORE THROAT: 0
EYE PAIN: 0
SHORTNESS OF BREATH: 0
CONSTIPATION: 0
COUGH: 0
ABDOMINAL PAIN: 0
PHOTOPHOBIA: 0
DIARRHEA: 0
ABDOMINAL DISTENTION: 0
VOMITING: 0

## 2020-08-11 NOTE — PROGRESS NOTES
8/11/2020    Nicci Browning is a 78 y.o. femalehere for: belly rash      HPI:    Rash on the umbilicus and around the that area which appeared out of no where  Everything is same on detergent   Itchy slightly  Cortisone cream on the belly and most of the redness was gone  Wears ladies' depends and had off brand diaper  That brand was stiffer  No fever, chills, n/v, headache, diarrhea          BP Readings from Last 3 Encounters:   08/11/20 123/63   06/16/20 132/80   04/07/20 132/72     Current Outpatient Medications   Medication Sig Dispense Refill    clotrimazole-betamethasone (LOTRISONE) 1-0.05 % cream Apply topically 2 times daily for one week 1 Tube 0    lisinopril (PRINIVIL;ZESTRIL) 40 MG tablet Take 1 tablet by mouth daily 90 tablet 1    atorvastatin (LIPITOR) 10 MG tablet TAKE ONE TABLET BY MOUTH EVERY NIGHT 90 tablet 2    diclofenac sodium (VOLTAREN) 1 % GEL Apply 2 g topically 4 times daily 2 Tube 2    Biotin 1000 MCG TABS Take by mouth      Multiple Vitamins-Minerals (THERAPEUTIC MULTIVITAMIN-MINERALS) tablet Take 1 tablet by mouth daily       No current facility-administered medications for this visit.        Allergies   Allergen Reactions    Aspirin Swelling     Lips swelling    Penicillins      Unsure of reaction       Past Medical & Surgical History:      Diagnosis Date    Allergic rhinitis     Alopecia of scalp 9/3/2015    Anxiety 9/3/2014    Basal cell carcinoma of nose     left side    Breast CA ([facility] [address] 2001    right lumpectomy; DO NOT USE FOR BP,IV,VENIPUNVTURES PER PT    Constipated     postop hip replacement, impacted stool    Episodic low back pain     MRI 3/2010 normal    Essential hypertension 9/3/2015    Hyperlipidemia     Hypertension     pt states well controlled    Neck pain     Osteoarthritis 2/19/2014    Osteoarthritis 10/08/2014    Right knee on x-ray - [facility]    Osteoarthritis of both hips     Osteopenia     Urinary, incontinence, stress female 8/27/2015 Follows with Dr Osmin Lopez. Advised surgery or pessary. Past Surgical History:   Procedure Laterality Date    APPENDECTOMY      child    BREAST LUMPECTOMY  04/2000    COLONOSCOPY  2000    HYSTERECTOMY  1979    [facility]    JOINT REPLACEMENT Right 10/01/2012     [facility]    OTHER SURGICAL HISTORY Left 10/18/2013    full thickness skin graft, repair open nose defect - Dr. Laura Fish / Dr. Haris Salazar       Family History:  No family history on file. Social History:  Social History     Tobacco Use    Smoking status: Never Smoker    Smokeless tobacco: Never Used   Substance Use Topics    Alcohol use: Yes     Comment: OCCAS  WINE       Immunization History   Administered Date(s) Administered    Influenza 10/15/2013    Influenza Virus Vaccine 11/30/2012, 10/02/2014    Influenza, High Dose (Fluzone 65 yrs and older) 10/01/2015, 10/21/2016, 09/05/2017, 09/18/2018    Influenza, Triv, inactivated, subunit, adjuvanted, IM (Fluad 65 yrs and older) 09/25/2019    Pneumococcal Conjugate 13-valent ([id number]) 09/03/2015    Pneumococcal Polysaccharide ([id number]) 11/01/2008    Zoster Live (Zostavax) 11/22/2014       Review of Systems   Constitutional: Negative for activity change, appetite change, chills, fatigue and fever. HENT: Negative for congestion, sore throat and trouble swallowing. Eyes: Negative for photophobia, pain and visual disturbance. Respiratory: Negative for cough and shortness of breath. Cardiovascular: Negative for chest pain, palpitations and leg swelling. Gastrointestinal: Negative for abdominal distention, abdominal pain, constipation, diarrhea, nausea and vomiting. Endocrine: Negative for heat intolerance and polydipsia. Genitourinary: Negative for difficulty urinating, dysuria, frequency and hematuria. Musculoskeletal: Negative for joint swelling, neck pain and neck stiffness. Skin: Negative for rash and wound. Neurological: Positive for light-headedness. Negative for dizziness, syncope, weakness, numbness and headaches. Psychiatric/Behavioral: Negative for agitation, behavioral problems and confusion. VS:  /63   Pulse 70   Temp 98 °F (36.7 °C) (Temporal)   Resp 14   Ht 5' 1.5\" (1.562 m)   Wt 125 lb (56.7 kg)   SpO2 98%   BMI 23.24 kg/m²     Physical Exam  Constitutional:       General: She is not in acute distress. Appearance: Normal appearance. She is not ill-appearing. HENT:      Head: Normocephalic and atraumatic. Right Ear: External ear normal.      Left Ear: External ear normal.      Nose: Nose normal. No congestion. Mouth/Throat:      Mouth: Mucous membranes are moist.      Pharynx: Oropharynx is clear. Eyes:      Conjunctiva/sclera: Conjunctivae normal.      Pupils: Pupils are equal, round, and reactive to light. Neck:      Musculoskeletal: Normal range of motion and neck supple. Cardiovascular:      Rate and Rhythm: Normal rate and regular rhythm. Pulses: Normal pulses. Heart sounds: Normal heart sounds. No murmur. Pulmonary:      Effort: Pulmonary effort is normal. No respiratory distress. Breath sounds: Normal breath sounds. No wheezing. Abdominal:      General: Bowel sounds are normal. There is no distension. Palpations: Abdomen is soft. Tenderness: There is no abdominal tenderness. There is no guarding. Musculoskeletal: Normal range of motion. General: No swelling or tenderness. Right lower leg: No edema. Left lower leg: No edema. Skin:     General: Skin is warm. Capillary Refill: Capillary refill takes less than 2 seconds. Coloration: Skin is not jaundiced. Comments: Erythematous umbilicus rash, no active drainage, slight scaling/crust   Neurological:      General: No focal deficit present. Mental Status: She is alert and oriented to person, place, and time. Mental status is at baseline.    Psychiatric:         Mood and Affect: Mood normal.         Behavior: Behavior normal.         Thought Content: Thought content normal.         Judgment: Judgment normal.         Assessment/Plan:  1. Rash  - unknown etiology. Pt does have seborrheic dermatitis on scalp and seborrheic keratosis all over her body. - rash on the umbilicus could likely be seborrheic dermatitis   - f/u if rash worsens in 1 week  - clotrimazole-betamethasone (LOTRISONE) 1-0.05 % cream; Apply topically 2 times daily for one week  Dispense: 1 Tube; Refill: 0      Follow up:  prn    Patient agrees with the above stated plan.       Gabi Malone MD  Family Medicine resident

## 2020-08-11 NOTE — PROGRESS NOTES
S: 78 y.o. female with   Chief Complaint   Patient presents with    Rash       Rash on abd started about 4 days ago. Periumbilical.  NO irritant changes other than different type of depends/absorbant pad. Self treated with cortisone cream.  Still has redness inside belly button    BP Readings from Last 3 Encounters:   08/11/20 123/63   06/16/20 132/80   04/07/20 132/72       O: VS:  height is 5' 1.5\" (1.562 m) and weight is 125 lb (56.7 kg). Her temporal temperature is 98 °F (36.7 °C). Her blood pressure is 123/63 and her pulse is 70. Her respiration is 14 and oxygen saturation is 98%. AAO/NAD, appropriate affect for mood  CV:  RRR, no murmur  Resp: CTAB  Erythema inside umbilicus     Impression/Plan:   1) Rash - Lotrisone 1w and RTO if not better      Health Maintenance Due   Topic Date Due    DTaP/Tdap/Td vaccine (1 - Tdap) 08/18/1959    Shingles Vaccine (2 of 3) 01/17/2015         Attending Physician Statement  I have discussed the case, including pertinent history and exam findings with the resident. I agree with the documented assessment and plan.       Nel Mills MD

## 2020-09-10 ENCOUNTER — HOSPITAL ENCOUNTER (OUTPATIENT)
Age: 80
Discharge: HOME | End: 2020-09-10
Payer: MEDICARE

## 2020-09-10 LAB
ALBUMIN SERPL-MCNC: 4.2 G/DL (ref 3.5–5.2)
ALP BLD-CCNC: 69 U/L (ref 35–104)
ALT SERPL-CCNC: 16 U/L (ref 0–32)
ANION GAP SERPL CALCULATED.3IONS-SCNC: 6 MMOL/L (ref 7–16)
AST SERPL-CCNC: 24 U/L (ref 0–31)
BILIRUB SERPL-MCNC: 0.6 MG/DL (ref 0–1.2)
BUN BLDV-MCNC: 23 MG/DL (ref 8–23)
CALCIUM SERPL-MCNC: 9.6 MG/DL (ref 8.6–10.2)
CHLORIDE BLD-SCNC: 107 MMOL/L (ref 98–107)
CHOLESTEROL, TOTAL: 192 MG/DL (ref 0–199)
CO2: 29 MMOL/L (ref 22–29)
CREAT SERPL-MCNC: 0.8 MG/DL (ref 0.5–1)
GFR AFRICAN AMERICAN: >60
GFR NON-AFRICAN AMERICAN: >60 ML/MIN/1.73
GLUCOSE BLD-MCNC: 85 MG/DL (ref 74–99)
HDLC SERPL-MCNC: 79 MG/DL
LDL CHOLESTEROL CALCULATED: 95 MG/DL (ref 0–99)
POTASSIUM SERPL-SCNC: 4.2 MMOL/L (ref 3.5–5)
SODIUM BLD-SCNC: 142 MMOL/L (ref 132–146)
TOTAL PROTEIN: 6.6 G/DL (ref 6.4–8.3)
TRIGL SERPL-MCNC: 88 MG/DL (ref 0–149)
TSH SERPL DL<=0.05 MIU/L-ACNC: 0.8 UIU/ML (ref 0.27–4.2)
VLDLC SERPL CALC-MCNC: 18 MG/DL

## 2020-09-10 PROCEDURE — 80061 LIPID PANEL: CPT

## 2020-09-10 PROCEDURE — 36415 COLL VENOUS BLD VENIPUNCTURE: CPT

## 2020-09-10 PROCEDURE — 84443 ASSAY THYROID STIM HORMONE: CPT

## 2020-09-10 PROCEDURE — 80053 COMPREHEN METABOLIC PANEL: CPT

## 2020-09-14 NOTE — PROGRESS NOTES
[facility]  Department of Family Medicine  Family Medicine Residency Program      Patient:  Jami Honeycutt 78 y.o. female     Date of Service: 9/14/20      Chief complaint:   Chief Complaint   Patient presents with    Hypertension    Health Maintenance     shingles would like to talk with you          History of Present Illness   The patient is a 78 y.o. female  presented to the clinic with complaints as above. Labs done 9/10/20. FLP, CMP and TSH all WNL. Labs reviewed. Had seb derm in 8/20 - was seen by Shawn Murphy. Given Lortrisone. This helped and the rash is now gone.  had Mitral Valve Repair and she was caring for him. He is also getting parkinsons. Still doing a lot of care for him. He had surgery at [address] [place] [address] for prostate. This is still causing stress. Had lost weight from 140-120 and has still lost 2 more pounds. She is hungry and is eating but just doesn't have time. HTN   Meds - taking meds. Symptoms - sometimes feels off balance. Likely because of her knee. Not when she gets up from bed. NO Cp/p. No SOB. Diet - no special diet. Exercise - has lost 3 more lbs. She is cooking a lot for her . Osteopenia  meds - vit D? Ari? She takes a one a day women's.  1000 IU of vit D.      OA of knees  meds - diclofenac gel. Helps but not completely. Needs flu vaccine. Recommended. Does not want mammo - feels she is to old. Does not want chemo/radiation. Does not want shingles. Ordered hearing aid and is waiting for it to come in. Wants to cont to take lipitor every day or she will be confused.        Past Medical History:      Diagnosis Date    Allergic rhinitis     Alopecia of scalp 9/3/2015    Anxiety 9/3/2014    Basal cell carcinoma of nose     left side    Breast CA ([facility] [address] 2001    right lumpectomy; DO NOT USE FOR BP,IV,VENIPUNVTURES PER PT    Constipated     postop hip replacement, impacted stool    Episodic low back pain     MRI 3/2010 normal    Essential hypertension 9/3/2015    Hyperlipidemia     Hypertension     pt states well controlled    Neck pain     Osteoarthritis 2/19/2014    Osteoarthritis 10/08/2014    Right knee on x-ray - [facility]    Osteoarthritis of both hips     Osteopenia     Urinary, incontinence, stress female 8/27/2015    Follows with Dr Osmin Lopez. Advised surgery or pessary.        Past Surgical History:        Procedure Laterality Date    APPENDECTOMY      child    BREAST LUMPECTOMY  04/2000    COLONOSCOPY  2000    HYSTERECTOMY  1979    [facility]    JOINT REPLACEMENT Right 10/01/2012     [facility]    OTHER SURGICAL HISTORY Left 10/18/2013    full thickness skin graft, repair open nose defect - Dr. Laura Fish / Dr. Carballo Prime       Allergies:    Aspirin and Penicillins    Social History:   Social History     Socioeconomic History    Marital status:      Spouse name: Not on file    Number of children: Not on file    Years of education: Not on file    Highest education level: Not on file   Occupational History    Not on file   Social Needs    Financial resource strain: Not on file    Food insecurity     Worry: Not on file     Inability: Not on file   [employer] needs     Medical: Not on file     Non-medical: Not on file   Tobacco Use    Smoking status: Never Smoker    Smokeless tobacco: Never Used   Substance and Sexual Activity    Alcohol use: Yes     Comment: OCCAS  WINE    Drug use: No    Sexual activity: Yes     Partners: Male   Lifestyle    Physical activity     Days per week: Not on file     Minutes per session: Not on file    Stress: Not on file   Relationships    Social connections     Talks on phone: Not on file     Gets together: Not on file     Attends [employer] service: Not on file     Active member of club or organization: Not on file     Attends meetings of clubs or organizations: Not on file     Relationship status: Not on file    Intimate partner violence     Fear of current or ex partner: Not on file     Emotionally abused: Not on file     Physically abused: Not on file     Forced sexual activity: Not on file   Other Topics Concern    Not on file   Social History Narrative    Has 3 children. 2 sons - [facility] and [facility]. Daughter lives here. 7 grandchildren. 2 great-grandchildren.  was teacher and . Was in the [facility] for a long time and they moved around. She is under stress taking care of her  at home. He had valve repair. Family History:   History reviewed. No pertinent family history. Review of Systems:   Review of Systems   Constitutional: Positive for fatigue. Negative for activity change, appetite change, chills and diaphoresis. Respiratory: Negative for cough, shortness of breath and wheezing. Cardiovascular: Negative for chest pain, palpitations and leg swelling. Endocrine: Positive for cold intolerance. Negative for heat intolerance. Musculoskeletal: Positive for arthralgias and gait problem. Neurological: Negative for dizziness, seizures, syncope, speech difficulty, weakness and light-headedness. Psychiatric/Behavioral: Positive for dysphoric mood. The patient is nervous/anxious. Physical Exam   Vitals: /64   Pulse 97   Temp 98.7 °F (37.1 °C) (Temporal)   Resp 16   Ht 5' (1.524 m)   Wt 122 lb (55.3 kg)   SpO2 96%   BMI 23.83 kg/m²   Physical Exam  Vitals signs and nursing note reviewed. Constitutional:       Appearance: Normal appearance. She is well-developed. She is not ill-appearing or toxic-appearing. HENT:      Head: Normocephalic and atraumatic. Eyes:      General: No scleral icterus. Right eye: No discharge. Left eye: No discharge. Neck:      Musculoskeletal: Neck supple. Thyroid: No thyromegaly. Vascular: No carotid bruit. Cardiovascular:      Rate and Rhythm: Normal rate and regular rhythm.       Heart sounds: S1 normal and S2 normal. Murmur present. Systolic murmur present with a grade of 2/6. No friction rub. No gallop. Pulmonary:      Effort: Pulmonary effort is normal. No respiratory distress. Breath sounds: Normal breath sounds and air entry. No decreased breath sounds, wheezing or rales. Abdominal:      Palpations: Abdomen is soft. Tenderness: There is no abdominal tenderness. Musculoskeletal:      Right lower leg: No edema. Left lower leg: No edema. Lymphadenopathy:      Cervical: No cervical adenopathy. Skin:     General: Skin is warm and dry. Comments: No rash on abdomen   Neurological:      General: No focal deficit present. Mental Status: She is alert and oriented to person, place, and time. Gait: Gait normal.   Psychiatric:         Attention and Perception: Attention normal.         Mood and Affect: Mood and affect normal.         Speech: Speech normal.         Behavior: Behavior normal. Behavior is cooperative. Thought Content: Thought content normal.         Cognition and Memory: Cognition and memory normal.         Judgment: Judgment normal.           Assessment and Plan       1. Essential hypertension  Well controlled on current meds. Reviewed labs. 2. Osteopenia of multiple sites  Ed pt to increase vit D to 3000 IU daily    3. Chronic pain of both knees  Pt needs knee replacement but is caring for her  and can not do at this time. 4. Mixed hyperlipidemia  Ed pt that she could start taking statin QOD. She would prefer to take it daily. Reviewed labs. 5. Anxiety  Cont to follow closely. Pt does not want meds currently but has lost a bit more weight. Under a lot of stress because of . 6. Weight loss - follow up on TSH. It is WNL but it is low. Return to Office: Return in about 3 months (around 12/15/2020) for HTN and mood.     Medication List:    Current Outpatient Medications   Medication Sig Dispense Refill  lisinopril (PRINIVIL;ZESTRIL) 40 MG tablet Take 1 tablet by mouth daily 90 tablet 1    atorvastatin (LIPITOR) 10 MG tablet TAKE ONE TABLET BY MOUTH EVERY NIGHT 90 tablet 2    diclofenac sodium (VOLTAREN) 1 % GEL Apply 2 g topically 4 times daily 2 Tube 2    Biotin 1000 MCG TABS Take by mouth      Multiple Vitamins-Minerals (THERAPEUTIC MULTIVITAMIN-MINERALS) tablet Take 1 tablet by mouth daily       No current facility-administered medications for this visit. Joaquin Beltran MD       Counseled regarding above diagnosis, including possible risks and complications,  especially if left uncontrolled. Counseled regarding the possible side effects, risks, benefits and alternatives to treatment; patient and/or guardian verbalizes understanding, agrees, feels comfortable with and wishes to proceed with above treatment plan. Call or go to ED immediately if symptoms worsen or persist. Advised patient to call with any new medication issues, and, as applicable, read allRx info from pharmacy to assure aware of all possible risks and side effects of medication before taking. As applicable, educational materialsand/or home exercises printed for patient's review and were included in patient instructions on his/her After Visit Summary and given to patient at the end of visit. Patient and/or guardian given opportunity to ask questions/raise concerns. The patient verbalized comfort and understanding ofinstructions. Follow Up:     Return in about 3 months (around 12/15/2020) for HTN and mood. or sooner if the above issues change unexpectedly or new issues develop     This document may have been prepared at leastpartially through the use of voice recognition software. Although effort is taken to assure the accuracy of this document, it is possible that grammatical, syntax,  or spelling errors may occur.

## 2020-09-15 ENCOUNTER — OFFICE VISIT (OUTPATIENT)
Dept: FAMILY MEDICINE CLINIC | Age: 80
End: 2020-09-15
Payer: MEDICARE

## 2020-09-15 VITALS
SYSTOLIC BLOOD PRESSURE: 138 MMHG | BODY MASS INDEX: 23.95 KG/M2 | HEART RATE: 97 BPM | HEIGHT: 60 IN | RESPIRATION RATE: 16 BRPM | WEIGHT: 122 LBS | TEMPERATURE: 98.7 F | DIASTOLIC BLOOD PRESSURE: 64 MMHG | OXYGEN SATURATION: 96 %

## 2020-09-15 PROCEDURE — 99214 OFFICE O/P EST MOD 30 MIN: CPT | Performed by: FAMILY MEDICINE

## 2020-09-15 NOTE — PATIENT INSTRUCTIONS
Get the flu vaccine at the pharmacy. Get extra vitamin D - total of 2000 to 3000 I. U.     Bring in your BP machine so we can compare to ours

## 2020-09-16 RX ORDER — ACETAMINOPHEN 160 MG
TABLET,DISINTEGRATING ORAL
COMMUNITY
End: 2020-12-15 | Stop reason: ALTCHOICE

## 2020-09-16 ASSESSMENT — ENCOUNTER SYMPTOMS
SHORTNESS OF BREATH: 0
COUGH: 0
WHEEZING: 0

## 2020-11-27 ENCOUNTER — TELEPHONE (OUTPATIENT)
Dept: FAMILY MEDICINE CLINIC | Age: 80
End: 2020-11-27

## 2020-11-27 NOTE — TELEPHONE ENCOUNTER
Patient needs to talk to doctor about her Lisinopril 40 mg. She is concerned about taking 20 mg one day and 40 the next. She would like some advice please.

## 2020-12-14 NOTE — PROGRESS NOTES
[facility]  Department of Family Medicine  Family Medicine Residency Program      Patient:  Gala Dec 78 y.o. female     Date of Service: 12/14/20      Chief complaint:   Chief Complaint   Patient presents with    Hypertension    Mood Swings     patient states that depression due to the pandemic     Discuss Medications     patient is having trouble taking lipitor every other day          History of Present Illness   The patient is a 78 y.o. female  presented to the clinic with complaints as above. Pt with hx of right breast ca, basal ca of nose. Social hx:  From note 9/20   had Mitral Valve Repair and she was caring for him. He is also getting parkinsons. Still doing a lot of care for him. He had surgery at [address] [place] [address] for prostate. This is still causing stress. The parkinsons is starting to bother her  more he is shuffling more and not as expressive. And she feels weepy every morning. She lost her sister recently. Locked in house because of covid. She feels that she can manage at this point and is not interested in counseling.  was an [employer]  for 20 years. Has 4 Greatgrandchildren. 7 grandchildren. 3 children. Has not been able to see them because of COVID.     Had lost weight from 140-120. Stable this visit. She is hungry and is eating but just doesn't have time. HTN  Lisinopril - she is taking. Symptoms - no CP/P, SOB, palpatations, lightheadness, dizzyness, s/s of stroke. Doing ok on the meds. Mood  How is she doing? She is not doing very well. She is in a lot of pain when she gets up in the AM.  She has to help her  out. Lost weight, did not want meds last visit. Did not lose weight. Not interested in counseling right now or meds. She feels she will know when she is ready for this. lipitor every other night is making it hard on her. She will go back to taking it every night.       Prev: last time did not feel she wanted mammo. Has hx of right breast ca. It is ordered. She has the sheet and will call if you want. 2 weeks from now she has appt to get basal cell removed from nose. She had an issue with constipation. She went to Dr. Simi Scott. She was given a pill to take every ngith. Went about 1 month ago for this. Past Medical History:      Diagnosis Date    Allergic rhinitis     Alopecia of scalp 9/3/2015    Anxiety 9/3/2014    Basal cell carcinoma of nose     left side    Breast CA ([facility] [address] 2001    right lumpectomy; DO NOT USE FOR BP,IV,VENIPUNVTURES PER PT    Constipated     postop hip replacement, impacted stool    Episodic low back pain     MRI 3/2010 normal    Essential hypertension 9/3/2015    Hyperlipidemia     Hypertension     pt states well controlled    Neck pain     Osteoarthritis 2/19/2014    Osteoarthritis 10/08/2014    Right knee on x-ray - [facility]    Osteoarthritis of both hips     Osteopenia     Urinary, incontinence, stress female 8/27/2015    Follows with Dr Martha Madrid. Advised surgery or pessary.        Past Surgical History:        Procedure Laterality Date    APPENDECTOMY      child    BREAST LUMPECTOMY  04/2000    COLONOSCOPY  2000    HYSTERECTOMY  1979    [facility]    JOINT REPLACEMENT Right 10/01/2012     [facility]    OTHER SURGICAL HISTORY Left 10/18/2013    full thickness skin graft, repair open nose defect - Dr. Cherelle Wright / Dr. Pascual Null       Allergies:    Aspirin and Penicillins    Social History:   Social History     Socioeconomic History    Marital status:      Spouse name: Not on file    Number of children: Not on file    Years of education: Not on file    Highest education level: Not on file   Occupational History    Not on file   Social Needs    Financial resource strain: Not on file    Food insecurity     Worry: Not on file     Inability: Not on file    Transportation needs     Medical: Not on file     Non-medical: Not on file   Tobacco Use    Smoking status: Never Smoker    Smokeless tobacco: Never Used   Substance and Sexual Activity    Alcohol use: Yes     Comment: OCCAS  WINE    Drug use: No    Sexual activity: Yes     Partners: Male   Lifestyle    Physical activity     Days per week: Not on file     Minutes per session: Not on file    Stress: Not on file   Relationships    Social connections     Talks on phone: Not on file     Gets together: Not on file     Attends Gnosticism service: Not on file     Active member of club or organization: Not on file     Attends meetings of clubs or organizations: Not on file     Relationship status: Not on file    Intimate partner violence     Fear of current or ex partner: Not on file     Emotionally abused: Not on file     Physically abused: Not on file     Forced sexual activity: Not on file   Other Topics Concern    Not on file   Social History Narrative    Has 3 children. 2 sons - [facility] [place]. Daughter lives here. 7 grandchildren. 2 great-grandchildren.  was teacher and . Was in the [employer] for a long time and they moved around. She is under stress taking care of her  at home. He had valve repair. Family History:   History reviewed. No pertinent family history. Review of Systems:   Review of Systems    Physical Exam   Vitals: /88   Pulse 78   Temp 97 °F (36.1 °C) (Temporal)   Resp 16   Ht 5' (1.524 m)   Wt 123 lb (55.8 kg)   SpO2 97%   BMI 24.02 kg/m²   Physical Exam  Vitals signs and nursing note reviewed. Constitutional:       Appearance: Normal appearance. She is well-developed. She is not ill-appearing or toxic-appearing. HENT:      Head: Normocephalic and atraumatic. Eyes:      General: No scleral icterus. Right eye: No discharge. Left eye: No discharge. Neck:      Musculoskeletal: Neck supple. Thyroid: No thyromegaly. Vascular: No carotid bruit.    Cardiovascular: Rate and Rhythm: Normal rate and regular rhythm. Heart sounds: S1 normal and S2 normal. Murmur present. Systolic murmur present with a grade of 2/6. No friction rub. No gallop. Pulmonary:      Effort: Pulmonary effort is normal. No respiratory distress. Breath sounds: Normal breath sounds and air entry. No decreased breath sounds, wheezing or rales. Abdominal:      Palpations: Abdomen is soft. Tenderness: There is no abdominal tenderness. Musculoskeletal:      Right lower leg: No edema. Left lower leg: No edema. Lymphadenopathy:      Cervical: No cervical adenopathy. Skin:     General: Skin is warm and dry. Neurological:      General: No focal deficit present. Mental Status: She is alert and oriented to person, place, and time. Gait: Gait normal.   Psychiatric:         Attention and Perception: Attention normal.         Mood and Affect: Affect normal. Mood is anxious. Speech: Speech normal.         Behavior: Behavior normal. Behavior is cooperative. Thought Content: Thought content normal.         Cognition and Memory: Cognition and memory normal.         Judgment: Judgment normal.           Assessment and Plan       1. Anxiety  Pt not ready for counseling or meds. Will keep me updated. We will follow up in 3 mns for this but she will call prior to this if she feels she needs help before 3 mn. 2. Mixed hyperlipidemia  Pt we restart lipitor daily again. It is too confusing for her to take QOD. - atorvastatin (LIPITOR) 10 MG tablet; Every night  Dispense: 90 tablet; Refill: 2    3. Essential hypertension  Stable on current meds. - lisinopril (PRINIVIL;ZESTRIL) 40 MG tablet; Take 1 tablet by mouth daily  Dispense: 90 tablet; Refill: 1    4. Prevention  Pt is considering mammogram.  Per guidelines doesn't really need but she has hx of breast ca. We discussed and she is considering.      Return to Office: Return in about 3 months (around 3/15/2021)

## 2020-12-15 ENCOUNTER — OFFICE VISIT (OUTPATIENT)
Dept: FAMILY MEDICINE CLINIC | Age: 80
End: 2020-12-15
Payer: MEDICARE

## 2020-12-15 VITALS
DIASTOLIC BLOOD PRESSURE: 88 MMHG | WEIGHT: 123 LBS | HEIGHT: 60 IN | OXYGEN SATURATION: 97 % | SYSTOLIC BLOOD PRESSURE: 132 MMHG | HEART RATE: 78 BPM | RESPIRATION RATE: 16 BRPM | BODY MASS INDEX: 24.15 KG/M2 | TEMPERATURE: 97 F

## 2020-12-15 PROCEDURE — 99213 OFFICE O/P EST LOW 20 MIN: CPT | Performed by: FAMILY MEDICINE

## 2020-12-16 RX ORDER — ATORVASTATIN CALCIUM 10 MG/1
TABLET, FILM COATED ORAL
Qty: 90 TABLET | Refills: 2 | Status: SHIPPED
Start: 2020-12-16 | End: 2021-07-30

## 2020-12-16 RX ORDER — ATORVASTATIN CALCIUM 10 MG/1
TABLET, FILM COATED ORAL
Qty: 90 TABLET | Refills: 2
Start: 2020-12-16 | End: 2020-12-16 | Stop reason: SDUPTHER

## 2020-12-16 RX ORDER — LISINOPRIL 40 MG/1
40 TABLET ORAL DAILY
Qty: 90 TABLET | Refills: 1 | Status: SHIPPED
Start: 2020-12-16 | End: 2021-11-18 | Stop reason: SDUPTHER

## 2021-03-18 NOTE — PROGRESS NOTES
Willian Zamorano (:  1940) is a 78 y.o. female,Established patient, here for evaluation of the following chief complaint(s): Anxiety      ASSESSMENT/PLAN:  1. Anxiety  Improved. Pt still does not want counseling or meds. 2. Abnormal intentional weight loss  -     CBC Auto Differential; Future  Pt is now stable on her weight  3. Essential hypertension  -     COMPREHENSIVE METABOLIC PANEL; Future  Stable. Labs ordered for prior to next visit. 4. Mixed hyperlipidemia  -     Lipid, Fasting; Future  -     COMPREHENSIVE METABOLIC PANEL; Future  Labs ordered prior to next visit. 5. Hx of skin cancer, basal cell  Pt had skin procedure done by [facility] plastics. Has close follow up with them. Return in about 3 months (around 2021) for AWV. SUBJECTIVE/OBJECTIVE:  HPI   Last visit pt feeling lots of stress from husbands MV repair, prostate surgery and parkinson's diagnosis. Was an [employer] . She had lost 20 lbs during this year. Was not interested in counseling or meds at the time. She feels that she is doing al that she can for her . Daughter goes to the appointments. She does not feel depressed at this time. Wants to go out and do things. Sees meaning in her life. Has not lost any more weight. No Cp/p. No increased SOB. No s/s of stroke. No edema in LE. She had the basal cell removed again from her nose - Dr. Claudeen Aid and Dr. Sunny Gregory worked on her within the last month. She has regular check-ups. Feels better now that she got her COVID vaccine. Went to [place]. AWV due in . Can get labs at that time. Got 2nd COVID , 1st one in  - will bring in evidence    Review of Systems - see above    Physical Exam  Constitutional:       Appearance: Normal appearance. She is normal weight. HENT:      Head: Normocephalic and atraumatic. Cardiovascular:      Rate and Rhythm: Normal rate and regular rhythm.       Heart sounds: Normal heart sounds. Pulmonary:      Effort: Pulmonary effort is normal. No respiratory distress. Breath sounds: Normal breath sounds. No wheezing or rhonchi. Abdominal:      Palpations: Abdomen is soft. Tenderness: There is no abdominal tenderness. Skin:     General: Skin is warm and dry. Neurological:      General: No focal deficit present. Mental Status: She is alert and oriented to person, place, and time. Psychiatric:         Mood and Affect: Mood normal.         Thought Content: Thought content normal.                 An electronic signature was used to authenticate this note.     --Angela Gomez MD

## 2021-03-19 ENCOUNTER — OFFICE VISIT (OUTPATIENT)
Dept: FAMILY MEDICINE CLINIC | Age: 81
End: 2021-03-19
Payer: MEDICARE

## 2021-03-19 VITALS
DIASTOLIC BLOOD PRESSURE: 58 MMHG | WEIGHT: 125 LBS | TEMPERATURE: 97.1 F | OXYGEN SATURATION: 98 % | BODY MASS INDEX: 24.54 KG/M2 | HEART RATE: 66 BPM | RESPIRATION RATE: 14 BRPM | SYSTOLIC BLOOD PRESSURE: 139 MMHG | HEIGHT: 60 IN

## 2021-03-19 DIAGNOSIS — I10 ESSENTIAL HYPERTENSION: Chronic | ICD-10-CM

## 2021-03-19 DIAGNOSIS — Z85.828 HX OF SKIN CANCER, BASAL CELL: ICD-10-CM

## 2021-03-19 DIAGNOSIS — E78.2 MIXED HYPERLIPIDEMIA: Chronic | ICD-10-CM

## 2021-03-19 DIAGNOSIS — F41.9 ANXIETY: Primary | Chronic | ICD-10-CM

## 2021-03-19 DIAGNOSIS — R63.4 ABNORMAL INTENTIONAL WEIGHT LOSS: ICD-10-CM

## 2021-03-19 PROCEDURE — 99213 OFFICE O/P EST LOW 20 MIN: CPT | Performed by: FAMILY MEDICINE

## 2021-03-19 ASSESSMENT — PATIENT HEALTH QUESTIONNAIRE - PHQ9: 2. FEELING DOWN, DEPRESSED OR HOPELESS: 1

## 2021-06-16 ENCOUNTER — NURSE ONLY (OUTPATIENT)
Dept: FAMILY MEDICINE CLINIC | Age: 81
End: 2021-06-16
Payer: MEDICARE

## 2021-06-16 DIAGNOSIS — E78.2 MIXED HYPERLIPIDEMIA: Chronic | ICD-10-CM

## 2021-06-16 DIAGNOSIS — I10 ESSENTIAL HYPERTENSION: Chronic | ICD-10-CM

## 2021-06-16 DIAGNOSIS — R63.4 ABNORMAL INTENTIONAL WEIGHT LOSS: ICD-10-CM

## 2021-06-16 LAB
ALBUMIN SERPL-MCNC: 4.6 G/DL (ref 3.5–5.2)
ALP BLD-CCNC: 74 U/L (ref 35–104)
ALT SERPL-CCNC: 12 U/L (ref 0–32)
ANION GAP SERPL CALCULATED.3IONS-SCNC: 11 MMOL/L (ref 7–16)
AST SERPL-CCNC: 29 U/L (ref 0–31)
BASOPHILS ABSOLUTE: 0.04 E9/L (ref 0–0.2)
BASOPHILS RELATIVE PERCENT: 0.8 % (ref 0–2)
BILIRUB SERPL-MCNC: 0.5 MG/DL (ref 0–1.2)
BUN BLDV-MCNC: 20 MG/DL (ref 6–23)
CALCIUM SERPL-MCNC: 9.1 MG/DL (ref 8.6–10.2)
CHLORIDE BLD-SCNC: 105 MMOL/L (ref 98–107)
CHOLESTEROL, FASTING: 226 MG/DL (ref 0–199)
CO2: 27 MMOL/L (ref 22–29)
CREAT SERPL-MCNC: 0.7 MG/DL (ref 0.5–1)
EOSINOPHILS ABSOLUTE: 0.13 E9/L (ref 0.05–0.5)
EOSINOPHILS RELATIVE PERCENT: 2.6 % (ref 0–6)
GFR AFRICAN AMERICAN: >60
GFR NON-AFRICAN AMERICAN: >60 ML/MIN/1.73
GLUCOSE BLD-MCNC: 88 MG/DL (ref 74–99)
HCT VFR BLD CALC: 44.3 % (ref 34–48)
HDLC SERPL-MCNC: 81 MG/DL
HEMOGLOBIN: 13.7 G/DL (ref 11.5–15.5)
IMMATURE GRANULOCYTES #: 0.01 E9/L
IMMATURE GRANULOCYTES %: 0.2 % (ref 0–5)
LDL CHOLESTEROL CALCULATED: 124 MG/DL (ref 0–99)
LYMPHOCYTES ABSOLUTE: 1.9 E9/L (ref 1.5–4)
LYMPHOCYTES RELATIVE PERCENT: 38.3 % (ref 20–42)
MCH RBC QN AUTO: 29.1 PG (ref 26–35)
MCHC RBC AUTO-ENTMCNC: 30.9 % (ref 32–34.5)
MCV RBC AUTO: 94.1 FL (ref 80–99.9)
MONOCYTES ABSOLUTE: 0.5 E9/L (ref 0.1–0.95)
MONOCYTES RELATIVE PERCENT: 10.1 % (ref 2–12)
NEUTROPHILS ABSOLUTE: 2.38 E9/L (ref 1.8–7.3)
NEUTROPHILS RELATIVE PERCENT: 48 % (ref 43–80)
PDW BLD-RTO: 13.2 FL (ref 11.5–15)
PLATELET # BLD: 214 E9/L (ref 130–450)
PMV BLD AUTO: 9.5 FL (ref 7–12)
POTASSIUM SERPL-SCNC: 4.5 MMOL/L (ref 3.5–5)
RBC # BLD: 4.71 E12/L (ref 3.5–5.5)
SODIUM BLD-SCNC: 143 MMOL/L (ref 132–146)
TOTAL PROTEIN: 7.3 G/DL (ref 6.4–8.3)
TRIGLYCERIDE, FASTING: 104 MG/DL (ref 0–149)
VLDLC SERPL CALC-MCNC: 21 MG/DL
WBC # BLD: 5 E9/L (ref 4.5–11.5)

## 2021-06-16 PROCEDURE — 36415 COLL VENOUS BLD VENIPUNCTURE: CPT | Performed by: FAMILY MEDICINE

## 2021-06-23 RX ORDER — ESTRADIOL 0.1 MG/G
CREAM VAGINAL
COMMUNITY
Start: 2021-04-06 | End: 2021-07-27 | Stop reason: SINTOL

## 2021-06-23 NOTE — PROGRESS NOTES
Pt here for an AWV - do ACP Docs. done  Was under lots of stress because of 's illnesses. He was a previous [employer] . Did not want counseling. She is coping aright. Thins are still lonely. She is very busy with her .  with parkinsons. Was placed on estrace per GYN vaginally. She had a thick discharge and this helped it. Her BP meds - she wanted to know about lisinopril and if it was ok to take. Wants to know about lipitor. Had her COVID vaccine. Had labs drawn for her hyperlipidemia, weight loss and HTN. Still waiting to get her knee done because of her 's illness  Had basal cell removed from her nose    CMP, CBC  WNL    Component      Latest Ref Rng & Units 2021 9/10/2020 2020          12:00 PM 11:30 AM  2:33 PM   Cholesterol, Total      0 - 199 mg/dL  192 227 (H)   Triglycerides      0 - 149 mg/dL  88 99   HDL Cholesterol      >40 mg/dL 81 79 92   LDL Calculated      0 - 99 mg/dL 124 (H) 95 115 (H)   VLDL Cholesterol Calculated      mg/dL 21 18 20   Cholesterol, Fasting      0 - 199 mg/dL 226 (H)     Triglyceride, Fasting      0 - 149 mg/dL 104       Current Outpatient Medications   Medication Sig Dispense Refill    estradiol (ESTRACE) 0.1 MG/GM vaginal cream INSERT 1/4 APPLICATORFUL VAGINALLY TWICE A WEEK      lisinopril (PRINIVIL;ZESTRIL) 40 MG tablet Take 1 tablet by mouth daily 90 tablet 1    atorvastatin (LIPITOR) 10 MG tablet Every night 90 tablet 2     No current facility-administered medications for this visit. Medicare Annual Wellness Visit  Name: Baldomero Romano Date: 2021   MRN: [id number] Sex: Female   Age: 78 y.o. Ethnicity: Non-/Non    : 1940 Race: Karol Sebastian is here for Medicare AWV    Screenings for behavioral, psychosocial and functional/safety risks, and cognitive dysfunction are all negative except as indicated below.  These results, as well as other patient data from the Health Risk Assessment form, are documented in Flowsheets linked to this Encounter. Allergies   Allergen Reactions    Aspirin Swelling     Lips swelling    Penicillins      Unsure of reaction         Prior to Visit Medications    Medication Sig Taking? Authorizing Provider   estradiol (ESTRACE) 0.1 MG/GM vaginal cream INSERT 1/4 APPLICATORFUL VAGINALLY TWICE A WEEK Yes Historical Provider, MD   lisinopril (PRINIVIL;ZESTRIL) 40 MG tablet Take 1 tablet by mouth daily Yes Diaz Mccormick MD   atorvastatin (LIPITOR) 10 MG tablet Every night Yes Diaz Mccormick MD         Past Medical History:   Diagnosis Date    Allergic rhinitis     Alopecia of scalp 9/3/2015    Anxiety 9/3/2014    Basal cell carcinoma of nose     left side    Breast CA ([facility] [address] 2001    right lumpectomy; DO NOT USE FOR BP,IV,VENIPUNVTURES PER PT    Constipated     postop hip replacement, impacted stool    Episodic low back pain     MRI 3/2010 normal    Essential hypertension 9/3/2015    Hyperlipidemia     Hypertension     pt states well controlled    Neck pain     Osteoarthritis 2/19/2014    Osteoarthritis 10/08/2014    Right knee on x-ray - [facility]    Osteoarthritis of both hips     Osteopenia     Urinary, incontinence, stress female 8/27/2015    Follows with Dr Reji Mcdonald. Advised surgery or pessary. Past Surgical History:   Procedure Laterality Date    APPENDECTOMY      child    BREAST LUMPECTOMY  04/2000    COLONOSCOPY  2000    HYSTERECTOMY  1979    [facility]    JOINT REPLACEMENT Right 10/01/2012     Right Hip - [facility]    OTHER SURGICAL HISTORY Left 10/18/2013    full thickness skin graft, repair open nose defect - Dr. Dilia Banks / Dr. Bety Sun       No family history on file.     CareTeam (Including outside providers/suppliers regularly involved in providing care):   Patient Care Team:  Diaz Mccormick MD as PCP - General (Family Medicine)  Diaz Mccormick MD as PCP - REHABILITATION [facility] Empaneled Provider    Wt Readings from Last 3 Encounters: 06/24/21 127 lb 6.4 oz (57.8 kg)   03/19/21 125 lb (56.7 kg)   12/15/20 123 lb (55.8 kg)     Vitals:    06/24/21 1344   BP: 130/60   Pulse: 61   Resp: 16   Temp: 97.8 °F (36.6 °C)   TempSrc: Temporal   SpO2: 96%   Weight: 127 lb 6.4 oz (57.8 kg)   Height: 5' 1\" (1.549 m)     Body mass index is 24.07 kg/m². Based upon direct observation of the patient, evaluation of cognition reveals recent and remote memory intact. General Appearance: alert and oriented to person, place and time, well-developed and well-nourished, in no acute distress  Skin: warm and dry, no rash or erythema  Head: normocephalic and atraumatic  Eyes: pupils equal, round, and reactive to light and conjunctivae normal  Pulmonary/Chest: clear to auscultation bilaterally- no wheezes, rales or rhonchi, normal air movement, no respiratory distress  Cardiovascular: normal rate, normal S1 and S2, no gallops, intact distal pulses and no carotid bruits  Extremities: no edema    Patient's complete Health Risk Assessment and screening values have been reviewed and are found in Flowsheets. The following problems were reviewed today and where indicated follow up appointments were made and/or referrals ordered. Positive Risk Factor Screenings with Interventions:            General Health and ACP:  General  In general, how would you say your health is?: Good  In the past 7 days, have you experienced any of the following?  New or Increased Pain, New or Increased Fatigue, Loneliness, Social Isolation, Stress or Anger?: (!) Stress, Social Isolation  Do you get the social and emotional support that you need?: Yes  Do you have a Living Will?: Yes  Advance Directives     Power of  Living Will ACP-Advance Directive ACP-Power of     Not on File Filed on 10/12/12 Filed Not on File      General Health Risk Interventions:  · Social isolation: patient declines any further intervention for this issue    Health Habits/Nutrition:  Health Habits/Nutrition  Do you exercise for at least 20 minutes 2-3 times per week?: (!) No  Have you lost any weight without trying in the past 3 months?: No  Do you eat only one meal per day?: No  Have you seen the dentist within the past year?: Yes  Body mass index: 24.07  Health Habits/Nutrition Interventions:  · Inadequate physical activity:  patient is not ready to increase his/her physical activity level at this time      ADL:  ADLs  In the past 7 days, did you need help from others to perform any of the following everyday activities? Eating, dressing, grooming, bathing, toileting, or walking/balance?: (!) Walking/Balance (Cane for knee pain)  In the past 7 days, did you need help from others to take care of any of the following?  Laundry, housekeeping, banking/finances, shopping, telephone use, food preparation, transportation, or taking medications?: None  ADL Interventions:  · pt given set of balance and strengthening exercises to do at home    Personalized Preventive Plan   Current Health Maintenance Status  Immunization History   Administered Date(s) Administered    COVID-19, Guillen Peter, PF, 30mcg/0.3mL 01/22/2021, 02/13/2021    Influenza 10/15/2013    Influenza Virus Vaccine 11/30/2012, 10/02/2014    Influenza, High Dose (Fluzone 65 yrs and older) 10/01/2015, 10/21/2016, 09/05/2017, 09/18/2018, 09/24/2020    Influenza, Quadv, adjuvanted, 65 yrs +, IM, PF (Fluad) 09/24/2020    Influenza, Triv, inactivated, subunit, adjuvanted, IM (Fluad 65 yrs and older) 09/25/2019    Pneumococcal Conjugate 13-valent ([id number]) 09/03/2015    Pneumococcal Polysaccharide ([id number]) 11/01/2008    Zoster Live (Zostavax) 11/01/2014        Health Maintenance   Topic Date Due    DTaP/Tdap/Td vaccine (1 - Tdap) Never done    Shingles Vaccine (2 of 3) 12/27/2014    Annual Wellness Visit (AWV)  Never done    Lipid screen  06/16/2022    Potassium monitoring  06/16/2022    Creatinine monitoring  06/16/2022    DEXA (modify frequency per FRAX score)  Completed    Flu vaccine  Completed    Pneumococcal 65+ years Vaccine  Completed    COVID-19 Vaccine  Completed    Hepatitis A vaccine  Aged Out    Hepatitis B vaccine  Aged Out    Hib vaccine  Aged Out    Meningococcal (ACWY) vaccine  Aged Out     Recommendations for [employer] Due: see orders and patient instructions/AVS.  . Recommended screening schedule for the next 5-10 years is provided to the patient in written form: see Patient Sejal Guy was seen today for medicare awv. Diagnoses and all orders for this visit:    Routine general medical examination at a [facility]  Ed on information as above. Pt is Up to date on preventive items. Is not currently interested in any other vaccines. Essential hypertension  Currently stable. Mixed hyperlipidemia  -     LIPID PANEL; Future  Discussed taking lipitor every other day and rechecking lipids in 3 mn with the idea that we might be able to stop this. Discussed increasing exercise will help with the lipid panel results. 38.6

## 2021-06-24 ENCOUNTER — OFFICE VISIT (OUTPATIENT)
Dept: FAMILY MEDICINE CLINIC | Age: 81
End: 2021-06-24
Payer: MEDICARE

## 2021-06-24 VITALS
WEIGHT: 127.4 LBS | DIASTOLIC BLOOD PRESSURE: 60 MMHG | TEMPERATURE: 97.8 F | HEART RATE: 61 BPM | RESPIRATION RATE: 16 BRPM | BODY MASS INDEX: 24.05 KG/M2 | OXYGEN SATURATION: 96 % | HEIGHT: 61 IN | SYSTOLIC BLOOD PRESSURE: 130 MMHG

## 2021-06-24 DIAGNOSIS — Z00.00 ROUTINE GENERAL MEDICAL EXAMINATION AT A HEALTH CARE FACILITY: Primary | ICD-10-CM

## 2021-06-24 DIAGNOSIS — I10 ESSENTIAL HYPERTENSION: Chronic | ICD-10-CM

## 2021-06-24 DIAGNOSIS — E78.2 MIXED HYPERLIPIDEMIA: Chronic | ICD-10-CM

## 2021-06-24 PROCEDURE — G0439 PPPS, SUBSEQ VISIT: HCPCS | Performed by: FAMILY MEDICINE

## 2021-06-24 ASSESSMENT — PATIENT HEALTH QUESTIONNAIRE - PHQ9
2. FEELING DOWN, DEPRESSED OR HOPELESS: 1
SUM OF ALL RESPONSES TO PHQ QUESTIONS 1-9: 1
1. LITTLE INTEREST OR PLEASURE IN DOING THINGS: 0
SUM OF ALL RESPONSES TO PHQ9 QUESTIONS 1 & 2: 1

## 2021-06-24 ASSESSMENT — LIFESTYLE VARIABLES
HOW MANY STANDARD DRINKS CONTAINING ALCOHOL DO YOU HAVE ON A TYPICAL DAY: 0
HOW OFTEN DO YOU HAVE A DRINK CONTAINING ALCOHOL: 4
HOW OFTEN DO YOU HAVE SIX OR MORE DRINKS ON ONE OCCASION: 0
AUDIT-C TOTAL SCORE: 4

## 2021-06-29 DIAGNOSIS — R21 RASH: ICD-10-CM

## 2021-06-29 RX ORDER — CLOTRIMAZOLE AND BETAMETHASONE DIPROPIONATE 10; .64 MG/G; MG/G
CREAM TOPICAL
Qty: 1 TUBE | Refills: 0 | Status: SHIPPED
Start: 2021-06-29 | End: 2021-09-24

## 2021-06-29 NOTE — TELEPHONE ENCOUNTER
Saw Dr Jennifer Krishnan back in August for a rash around the umbilicus area. She was prescribed Lotrisone at that time which worked to clear it up. She states that she is noticing a small spot in the same area and would like to know if you can send in a refill so she has it on hand.       Meds pending your approval

## 2021-07-21 ENCOUNTER — TELEPHONE (OUTPATIENT)
Dept: FAMILY MEDICINE CLINIC | Age: 81
End: 2021-07-21

## 2021-07-21 NOTE — TELEPHONE ENCOUNTER
Daughter/patient called the office concerned about possible side effects from Estradiol 0.1mg vaginal cream prescribed by gynecologist (Dr. Clive Azevedo) on 7/20/21. She used the cream around 8pm last evening and symptoms began about 1-2 hours later (sweating/clammy/feverish, and shaking for about 30 min). She contacted the gynecologist office today and was told to stop the medication, but symptoms occurred again this afternoon. Daughter describes it as what someone looks/behaves when their blood sugar is low. She is unable to check blood sugar, patient is non-diabetic to their knowledge.

## 2021-07-22 RX ORDER — NITROFURANTOIN 25; 75 MG/1; MG/1
CAPSULE ORAL
COMMUNITY
Start: 2021-07-21 | End: 2021-07-22 | Stop reason: SDUPTHER

## 2021-07-22 RX ORDER — NITROFURANTOIN 25; 75 MG/1; MG/1
100 CAPSULE ORAL 2 TIMES DAILY
Qty: 4 CAPSULE | Refills: 0 | Status: SHIPPED
Start: 2021-07-22 | End: 2021-07-27 | Stop reason: ALTCHOICE

## 2021-07-22 NOTE — TELEPHONE ENCOUNTER
Please call Dr. Fatuma Cohen office and let them know that I added 2 more days of the macrobid for a total of 5 days per [place] Guidelines. Please have them send the urine culture results to us. Thanks.

## 2021-07-22 NOTE — TELEPHONE ENCOUNTER
Called and talked with the patient. She is feeling better. Had several episodes of chills and feeling dizzy. Is now able to get around ok and is having no symptoms. Will come to clinic on Tuesday.

## 2021-07-27 ENCOUNTER — OFFICE VISIT (OUTPATIENT)
Dept: FAMILY MEDICINE CLINIC | Age: 81
End: 2021-07-27
Payer: MEDICARE

## 2021-07-27 VITALS
HEART RATE: 64 BPM | HEIGHT: 61 IN | DIASTOLIC BLOOD PRESSURE: 76 MMHG | OXYGEN SATURATION: 98 % | SYSTOLIC BLOOD PRESSURE: 138 MMHG | BODY MASS INDEX: 23.79 KG/M2 | TEMPERATURE: 97.1 F | WEIGHT: 126 LBS

## 2021-07-27 DIAGNOSIS — R30.0 DYSURIA: ICD-10-CM

## 2021-07-27 DIAGNOSIS — N30.01 ACUTE CYSTITIS WITH HEMATURIA: ICD-10-CM

## 2021-07-27 DIAGNOSIS — N30.01 ACUTE CYSTITIS WITH HEMATURIA: Primary | ICD-10-CM

## 2021-07-27 DIAGNOSIS — N95.2 VAGINAL ATROPHY: ICD-10-CM

## 2021-07-27 LAB
BACTERIA: ABNORMAL /HPF
BILIRUBIN URINE: NEGATIVE
BILIRUBIN, POC: ABNORMAL
BLOOD URINE, POC: ABNORMAL
BLOOD, URINE: ABNORMAL
CLARITY, POC: ABNORMAL
CLARITY: ABNORMAL
COLOR, POC: ABNORMAL
COLOR: YELLOW
EPITHELIAL CELLS, UA: ABNORMAL /HPF
GLUCOSE URINE, POC: ABNORMAL
GLUCOSE URINE: NEGATIVE MG/DL
KETONES, POC: ABNORMAL
KETONES, URINE: NEGATIVE MG/DL
LEUKOCYTE EST, POC: ABNORMAL
LEUKOCYTE ESTERASE, URINE: ABNORMAL
NITRITE, POC: ABNORMAL
NITRITE, URINE: NEGATIVE
PH UA: 5.5 (ref 5–9)
PH, POC: 5.5
PROTEIN UA: NEGATIVE MG/DL
PROTEIN, POC: ABNORMAL
RBC UA: ABNORMAL /HPF (ref 0–2)
SPECIFIC GRAVITY UA: 1.01 (ref 1–1.03)
SPECIFIC GRAVITY, POC: 1.01
UROBILINOGEN, POC: 0.2
UROBILINOGEN, URINE: 0.2 E.U./DL
WBC UA: >20 /HPF (ref 0–5)

## 2021-07-27 PROCEDURE — 99213 OFFICE O/P EST LOW 20 MIN: CPT | Performed by: FAMILY MEDICINE

## 2021-07-27 PROCEDURE — 81002 URINALYSIS NONAUTO W/O SCOPE: CPT | Performed by: FAMILY MEDICINE

## 2021-07-27 RX ORDER — PHENAZOPYRIDINE HYDROCHLORIDE 95 MG/1
95 TABLET ORAL 3 TIMES DAILY PRN
Qty: 6 TABLET | Refills: 0 | Status: SHIPPED | OUTPATIENT
Start: 2021-07-27 | End: 2021-07-29

## 2021-07-27 RX ORDER — OXYQUINOLINE SULFATE AND SODIUM LAURYL SULFATE .25; .1 MG/G; MG/G
JELLY VAGINAL
COMMUNITY
Start: 2021-07-21 | End: 2021-09-24

## 2021-07-27 NOTE — PROGRESS NOTES
S: 78 y.o. female with   Chief Complaint   Patient presents with    Urinary Tract Infection     Completed Macrobid, still having dysuria    Altered Mental Status     Feeling \"off\"/not herself       Pt is here for follow up on her urinary track infection. Feels foggy. She is still having burning with urination and increased urinary frequency. No fever, no chills. O: VS:  height is 5' 1\" (1.549 m) and weight is 126 lb (57.2 kg). Her temporal temperature is 97.1 °F (36.2 °C). Her blood pressure is 138/76 and her pulse is 64. Her oxygen saturation is 98%. BP Readings from Last 3 Encounters:   07/27/21 138/76   06/24/21 130/60   03/19/21 (!) 139/58     See resident note      Impression/Plan:   1) UTI - pt finished macrobid. Grow urine prior to starting a another antibiotic. 2) vaginal atropy - recommended that pt see GYN doc again. Health Maintenance Due   Topic Date Due    DTaP/Tdap/Td vaccine (1 - Tdap) Never done    Shingles Vaccine (2 of 3) 12/27/2014         Attending Physician Statement  I have discussed the case, including pertinent history and exam findings with the resident. I also have seen the patient and performed key portions of the examination. I agree with the documented assessment and plan.       Marcie Silver MD

## 2021-07-27 NOTE — PROGRESS NOTES
[facility]  Department of Family Medicine  Family Medicine Residency Program      Patient:  Gala Dec 78 y.o. female     Date of Service: 7/27/21      Chief complaint:   Chief Complaint   Patient presents with    Urinary Tract Infection     Completed Laci Call, still having dysuria    Altered Mental Status     Feeling \"off\"/not herself         History of Present Illness   The patient is a 78 y.o. female  presented to the clinic with complaints as above. UTI -recently was seen by OB/GYN and was placed on estrogen cream.  She states that when she applied this cream she had fever and chills. She was diagnosed with urinary tract infection and was placed on Macrobid. She completed a 5-day course of Macrobid but continues to have burning and increased urinary frequency. She denies any recent fever or chills. Past Medical History:      Diagnosis Date    Allergic rhinitis     Alopecia of scalp 9/3/2015    Anxiety 9/3/2014    Basal cell carcinoma of nose     left side    Breast CA ([address] 2001    right lumpectomy; DO NOT USE FOR BP,IV,VENIPUNVTURES PER PT    Constipated     postop hip replacement, impacted stool    Episodic low back pain     MRI 3/2010 normal    Essential hypertension 9/3/2015    Hyperlipidemia     Hypertension     pt states well controlled    Neck pain     Osteoarthritis 2/19/2014    Osteoarthritis 10/08/2014    Right knee on x-ray - [facility]    Osteoarthritis of both hips     Osteopenia     Urinary, incontinence, stress female 8/27/2015    Follows with Dr Cielo Rogers. Advised surgery or pessary.        Past Surgical History:        Procedure Laterality Date    APPENDECTOMY      child    BREAST LUMPECTOMY  04/2000    COLONOSCOPY  2000    HYSTERECTOMY  1979    [facility]    JOINT REPLACEMENT Right 10/01/2012     [facility]    OTHER SURGICAL HISTORY Left 10/18/2013    full thickness skin graft, repair open nose defect - Dr. Diane Eng /  [facility]       Allergies:    Aspirin and Penicillins    Social History:   Social History     Socioeconomic History    Marital status:      Spouse name: Not on file    Number of children: Not on file    Years of education: Not on file    Highest education level: Not on file   Occupational History    Not on file   Tobacco Use    Smoking status: Never Smoker    Smokeless tobacco: Never Used   Substance and Sexual Activity    Alcohol use: Yes     Comment: OCCAS  WINE    Drug use: No    Sexual activity: Yes     Partners: Male   Other Topics Concern    Not on file   Social History Narrative    Has 3 children. 2 sons - [facility] and [facility]. Daughter lives here. 7 grandchildren. 2 great-grandchildren.  was teacher and . Was in the [employer] for a long time and they moved around. She is under stress taking care of her  at home. He had valve repair. Social Determinants of Health     Financial Resource Strain:     Difficulty of Paying Living Expenses:    Food Insecurity:     Worried About Running Out of Food in the Last Year:     [address] in the Last Year:    Transportation Needs:     Lack of Transportation (Medical):  Lack of Transportation (Non-Medical):    Physical Activity:     Days of Exercise per Week:     Minutes of Exercise per Session:    Stress:     Feeling of Stress :    Social Connections:     Frequency of Communication with Friends and Family:     Frequency of Social Gatherings with Friends and Family:     Attends Orthodox Services:     Active Member of Clubs or Organizations:     Attends Club or Organization Meetings:     Marital Status:    Intimate Partner Violence:     Fear of Current or Ex-Partner:     Emotionally Abused:     Physically Abused:     Sexually Abused:         Family History:   No family history on file. Review of Systems:   Review of Systems   Constitutional: Positive for chills and fever. Negative for appetite change and fatigue. HENT: Negative for congestion, rhinorrhea and sore throat. Respiratory: Negative for cough, chest tightness and shortness of breath. Cardiovascular: Negative for chest pain and palpitations. Gastrointestinal: Negative for abdominal pain, constipation, diarrhea, nausea and vomiting. Genitourinary: Negative for dysuria and frequency. Neurological: Negative for dizziness and light-headedness. Physical Exam   Vitals: /76 (Site: Left Upper Arm, Position: Sitting, Cuff Size: Medium Adult)   Pulse 64   Temp 97.1 °F (36.2 °C) (Temporal)   Ht 5' 1\" (1.549 m)   Wt 126 lb (57.2 kg)   SpO2 98%   BMI 23.81 kg/m²     BP Readings from Last 3 Encounters:   07/27/21 138/76   06/24/21 130/60   03/19/21 (!) 139/58       Physical Exam  Constitutional:       General: She is not in acute distress. Appearance: Normal appearance. HENT:      Head: Normocephalic and atraumatic. Mouth/Throat:      Mouth: Mucous membranes are moist.      Pharynx: Oropharynx is clear. Eyes:      Extraocular Movements: Extraocular movements intact. Conjunctiva/sclera: Conjunctivae normal.   Cardiovascular:      Rate and Rhythm: Normal rate and regular rhythm. Pulses: Normal pulses. Heart sounds: Normal heart sounds. No murmur heard. Pulmonary:      Effort: Pulmonary effort is normal.      Breath sounds: Normal breath sounds. No wheezing. Abdominal:      General: Bowel sounds are normal. There is no distension. Palpations: Abdomen is soft. Tenderness: There is no abdominal tenderness. Musculoskeletal:         General: No swelling. Cervical back: Normal range of motion. Skin:     General: Skin is warm and dry. Neurological:      General: No focal deficit present. Mental Status: She is alert and oriented to person, place, and time. Cranial Nerves: No cranial nerve deficit.    Psychiatric:         Attention and Perception: Attention normal.         Mood and Affect: Mood normal.             Assessment and Plan       1. Acute cystitis with hematuria  - Completed course of Macrobid  - Will get repeat UA and urine culture before prescribing Abx  - Culture, Urine; Future  - URINALYSIS; Future  - phenazopyridine (AZO URINARY PAIN RELIEF) 95 MG tablet; Take 1 tablet by mouth 3 times daily as needed for Pain  Dispense: 6 tablet; Refill: 0    2. Dysuria  - POCT Urinalysis no Micro  - Culture, Urine; Future  - URINALYSIS; Future  - phenazopyridine (AZO URINARY PAIN RELIEF) 95 MG tablet; Take 1 tablet by mouth 3 times daily as needed for Pain  Dispense: 6 tablet; Refill: 0    3. Vaginal Atrophy  - Recommend following up with GYN      Counseled regarding above diagnosis, including possible risks and complications,  especially if left uncontrolled. Counseled regarding the possible side effects, risks, benefits and alternatives to treatment; patient and/or guardian verbalizes understanding, agrees, feels comfortable with and wishes to proceed with above treatment plan. Call or go to ED immediately if symptoms worsen or persist. Advised patient to call with any new medication issues, and, as applicable, read all Rx info from pharmacy to assure aware of all possible risks and side effects of medication before taking. Patient and/or guardian given opportunity to ask questions/raise concerns. The patient verbalized comfort and understanding of instructions. I encourage further reading and education about your health conditions. Information on many health conditions is provided by the American Academy of Family Physicians: https://familydoctor. org/  Please bring any questions to me at your next visit. Return to Office: Return if symptoms worsen or fail to improve.     Medication List:    Current Outpatient Medications   Medication Sig Dispense Refill    TRIMO-MAR 0.025-0.01 % GEL INSERT 1 APPLICATOR INTO THE VAGINA AS DIRECTED 3 TIMES WEEKLY      phenazopyridine (AZO URINARY PAIN RELIEF) 95 MG tablet Take 1 tablet by mouth 3 times daily as needed for Pain 6 tablet 0    clotrimazole-betamethasone (LOTRISONE) 1-0.05 % cream Apply topically 2 times daily for one week 1 Tube 0    lisinopril (PRINIVIL;ZESTRIL) 40 MG tablet Take 1 tablet by mouth daily 90 tablet 1    atorvastatin (LIPITOR) 10 MG tablet Every night 90 tablet 2     No current facility-administered medications for this visit. Viky Chan DO       This document may have been prepared at least partially through the use of voice recognition software. Although effort is taken to assure the accuracy of this document, it is possible that grammatical, syntax, or spelling errors may occur.

## 2021-07-28 ASSESSMENT — ENCOUNTER SYMPTOMS
CONSTIPATION: 0
RHINORRHEA: 0
VOMITING: 0
CHEST TIGHTNESS: 0
SORE THROAT: 0
ABDOMINAL PAIN: 0
DIARRHEA: 0
NAUSEA: 0
COUGH: 0
SHORTNESS OF BREATH: 0

## 2021-07-29 DIAGNOSIS — N30.01 ACUTE CYSTITIS WITH HEMATURIA: Primary | ICD-10-CM

## 2021-07-29 DIAGNOSIS — E78.2 MIXED HYPERLIPIDEMIA: ICD-10-CM

## 2021-07-29 LAB
ORGANISM: ABNORMAL
URINE CULTURE, ROUTINE: ABNORMAL

## 2021-07-29 RX ORDER — CEFDINIR 300 MG/1
300 CAPSULE ORAL 2 TIMES DAILY
Qty: 14 CAPSULE | Refills: 0 | Status: SHIPPED | OUTPATIENT
Start: 2021-07-29 | End: 2021-08-05

## 2021-07-29 NOTE — PROGRESS NOTES
Call patient about positive urine culture for E. coli. She is allergic to penicillins therefore we will we will go with Omnicef in lieu of amoxicillin. Patient expressed understanding of antibiotic dosing.

## 2021-07-30 RX ORDER — ATORVASTATIN CALCIUM 10 MG/1
TABLET, FILM COATED ORAL
Qty: 90 TABLET | Refills: 0 | Status: SHIPPED
Start: 2021-07-30 | End: 2022-03-07

## 2021-07-30 NOTE — TELEPHONE ENCOUNTER
Last Seen: 07/27/21  Next Visit: 09/24/21        atorvastatin (LIPITOR) 10 MG tablet          [id number] - [facility] [place], [place] - [address]   [address] , [address] [address]   Phone:  Philippe Kenny  Fax:  [phone]        Rx pended

## 2021-09-15 ENCOUNTER — TELEPHONE (OUTPATIENT)
Dept: FAMILY MEDICINE CLINIC | Age: 81
End: 2021-09-15

## 2021-09-15 ENCOUNTER — NURSE ONLY (OUTPATIENT)
Dept: FAMILY MEDICINE CLINIC | Age: 81
End: 2021-09-15
Payer: MEDICARE

## 2021-09-15 DIAGNOSIS — N30.01 ACUTE CYSTITIS WITH HEMATURIA: Primary | ICD-10-CM

## 2021-09-15 DIAGNOSIS — E78.2 MIXED HYPERLIPIDEMIA: Chronic | ICD-10-CM

## 2021-09-15 DIAGNOSIS — I10 ESSENTIAL HYPERTENSION: ICD-10-CM

## 2021-09-15 PROCEDURE — 36415 COLL VENOUS BLD VENIPUNCTURE: CPT | Performed by: FAMILY MEDICINE

## 2021-09-23 NOTE — PROGRESS NOTES
murmur is present with a grade of 2/6. No friction rub. No gallop. Comments: Murmur heard in aortic area  Pulmonary:      Effort: Pulmonary effort is normal. No respiratory distress. Breath sounds: Normal breath sounds and air entry. No decreased breath sounds, wheezing or rales. Abdominal:      Palpations: Abdomen is soft. Tenderness: There is no abdominal tenderness. Musculoskeletal:      Cervical back: Neck supple. Right lower leg: No edema. Left lower leg: No edema. Lymphadenopathy:      Cervical: No cervical adenopathy. Skin:     General: Skin is warm and dry. Neurological:      General: No focal deficit present. Mental Status: She is alert and oriented to person, place, and time. Gait: Gait normal.   Psychiatric:         Attention and Perception: Attention normal.         Mood and Affect: Mood and affect normal.         Speech: Speech normal.         Behavior: Behavior normal. Behavior is cooperative. Thought Content: Thought content normal.         Cognition and Memory: Cognition and memory normal.         Judgment: Judgment normal.          Subjective:      Gala Kirby is a 78 y.o. female who presents to the office today for a preoperative consultation at the request of surgeon  Dr. Mini Subramanian at [address] [place] [address] who plans on performing Right TKA on October 13. This consultation is requested for the specific conditions prompting preoperative evaluation (i.e. because of potential affect on operative risk): HTN, hyperlipidemia. Planned anesthesia is General.  The patient has the following known anesthesia issues: none  Patient has a bleeding risk of : no recent abnormal bleeding  Patient does not have objection to receiving blood products if needed. Patient's medications, allergies, past medical, surgical, social and family histories were reviewed and updated as appropriate.   Review of Systems  Constitutional: negative for chills, fatigue, fevers, malaise and sweats  Respiratory: negative for cough, dyspnea on exertion, shortness of breath and wheezing  Cardiovascular: negative for chest pain, chest pressure/discomfort, claudication, dyspnea, exertional chest pressure/discomfort, irregular heart beat, lower extremity edema, near-syncope, palpitations, paroxysmal nocturnal dyspnea, syncope and tachypnea  Gastrointestinal: negative for abdominal pain, change in bowel habits, constipation, diarrhea, dyspepsia, jaundice, melena, nausea, reflux symptoms and vomiting  Genitourinary:negative for burning, frequency  Hematologic/lymphatic: negative for bleeding, easy bruising, lymphadenopathy and petechiae  Neurological: negative for dizziness, seizures, speech problems, tremors, vertigo and weakness  Endocrine: negative for diabetic symptoms including none, blurry vision, polyphagia, polyuria and polydipsia         Objective:       see exam above    Predictors of intubation difficulty:   Morbid obesity? no   Anatomically abnormal facies? no   Prominent incisors? no   Receding mandible? no   Short, thick neck? no   Neck range of motion: normal    Cardiographics  ECG: no change since previous ECG dated 2017  (please see attached)  Echocardiogram: not done     Sept 2021:  Sinus  Rhythm  - occasional ectopic ventricular beat     Low voltage in limb leads. Imaging  Chest X-Ray: not done     Lab Review     see attached CMP, CBC. Assessment:        78 y.o. female with planned surgery as above. Known risk factors for perioperative complications: None      Cardiac Risk Estimation: per the Revised Cardiac Risk Index (Circ. 100:1043, 1999), the patient's risk factors for cardiac complications include none}, putting her in: RCI RISK CLASS I (0 risk factors, risk of major cardiac compl. appr. 0.5%)    Current medications which may produce withdrawal symptoms if withheld perioperatively: none. Plan:      1.  Preoperative workup as follows none  2. Change in medication regimen before surgery: none, continue med regimen including morning of surgery, w/sip of water  3. Prophylaxis for cardiac events with perioperative beta-blockers: not indicated  4. Invasive hemodynamic monitoring perioperatively: at the discretion of anesthesiologist  5. Deep vein thrombosis prophylaxis postoperatively:regimen to be chosen by surgical team  6. Surveillance for postoperative MI with ECG immediately postoperatively and on postoperative days 1 and 2 AND troponin levels 24 hours postoperatively and on day 4 or hospital discharge (whichever comes first): at the discretion of anesthesiologist  7. Other measures: per anesthesiology and ortho.

## 2021-09-24 ENCOUNTER — OFFICE VISIT (OUTPATIENT)
Dept: FAMILY MEDICINE CLINIC | Age: 81
End: 2021-09-24
Payer: MEDICARE

## 2021-09-24 VITALS
SYSTOLIC BLOOD PRESSURE: 138 MMHG | HEIGHT: 61 IN | OXYGEN SATURATION: 96 % | TEMPERATURE: 98 F | RESPIRATION RATE: 22 BRPM | WEIGHT: 121 LBS | DIASTOLIC BLOOD PRESSURE: 68 MMHG | HEART RATE: 59 BPM | BODY MASS INDEX: 22.84 KG/M2

## 2021-09-24 DIAGNOSIS — M25.562 CHRONIC PAIN OF BOTH KNEES: ICD-10-CM

## 2021-09-24 DIAGNOSIS — Z01.818 PRE-OP EVALUATION: ICD-10-CM

## 2021-09-24 DIAGNOSIS — G89.29 CHRONIC PAIN OF BOTH KNEES: ICD-10-CM

## 2021-09-24 DIAGNOSIS — M25.561 CHRONIC PAIN OF BOTH KNEES: ICD-10-CM

## 2021-09-24 DIAGNOSIS — I10 ESSENTIAL HYPERTENSION: ICD-10-CM

## 2021-09-24 DIAGNOSIS — E78.2 MIXED HYPERLIPIDEMIA: Chronic | ICD-10-CM

## 2021-09-24 PROCEDURE — 99214 OFFICE O/P EST MOD 30 MIN: CPT | Performed by: FAMILY MEDICINE

## 2021-09-24 PROCEDURE — G0008 ADMIN INFLUENZA VIRUS VAC: HCPCS | Performed by: FAMILY MEDICINE

## 2021-09-24 PROCEDURE — 90694 VACC AIIV4 NO PRSRV 0.5ML IM: CPT | Performed by: FAMILY MEDICINE

## 2021-09-24 PROCEDURE — 93000 ELECTROCARDIOGRAM COMPLETE: CPT | Performed by: FAMILY MEDICINE

## 2021-09-24 SDOH — ECONOMIC STABILITY: FOOD INSECURITY: WITHIN THE PAST 12 MONTHS, YOU WORRIED THAT YOUR FOOD WOULD RUN OUT BEFORE YOU GOT MONEY TO BUY MORE.: NEVER TRUE

## 2021-09-24 SDOH — ECONOMIC STABILITY: FOOD INSECURITY: WITHIN THE PAST 12 MONTHS, THE FOOD YOU BOUGHT JUST DIDN'T LAST AND YOU DIDN'T HAVE MONEY TO GET MORE.: NEVER TRUE

## 2021-09-24 ASSESSMENT — SOCIAL DETERMINANTS OF HEALTH (SDOH): HOW HARD IS IT FOR YOU TO PAY FOR THE VERY BASICS LIKE FOOD, HOUSING, MEDICAL CARE, AND HEATING?: NOT HARD AT ALL

## 2021-09-28 NOTE — ASSESSMENT & PLAN NOTE
Reviewed labs today. CMP, FLP, Microalbumin ordered today and normal.  Controlled on lisinopril. Continue this.

## 2021-10-21 ENCOUNTER — TELEPHONE (OUTPATIENT)
Dept: FAMILY MEDICINE CLINIC | Age: 81
End: 2021-10-21

## 2021-10-21 DIAGNOSIS — N89.8 VAGINAL DISCHARGE: Primary | ICD-10-CM

## 2021-10-21 RX ORDER — OXYQUINOLINE SULFATE AND SODIUM LAURYL SULFATE .25; .1 MG/G; MG/G
1 JELLY VAGINAL
Qty: 1 EACH | Refills: 0
Start: 2021-10-21

## 2021-10-21 NOTE — TELEPHONE ENCOUNTER
She has been on several pills for her urinary track infection. She got very weak from the antibiotics. Is off them now for a few days. Urine is now fine. Knee surgery was cancelled - now in Jan.  Trimo-Barnes jelly restart 2 X a week. She uses it for vaginal discharge. I took it off her medicine list accidentally. Told pt that I made this mistake. No diarrhea. No urinary symptoms. Some vaginal discharge.   Will restart her trimo-barnes

## 2021-10-21 NOTE — TELEPHONE ENCOUNTER
Patient called in confused about directions that were on her AVS from her visit back in Septemer: (see below)     STOP taking:  clotrimazole-betamethasone 1-0.05 %  cream (LOTRISONE)    Trimo-Barnes 0.025-0.01 % Gel      She said Dr Gwendolyn Arce prescribed the [place] and she wants to know why Dr Ace Valderrama told her to stop. She did call Dr Juliet Rivera office and they advised her to call here. Please advise .

## 2021-11-14 DIAGNOSIS — I10 ESSENTIAL HYPERTENSION: ICD-10-CM

## 2021-11-15 RX ORDER — LISINOPRIL 40 MG/1
TABLET ORAL
Qty: 90 TABLET | Refills: 0 | OUTPATIENT
Start: 2021-11-15

## 2021-11-15 NOTE — TELEPHONE ENCOUNTER
Please call patient. Can she get some blood drawn so I can check her potassium. It was slightly elevated at the last visit and I want to make sure it has normalized. Once I see the blood work, I can send in the refill for the lisinopril. Thanks.

## 2021-11-17 ENCOUNTER — TELEPHONE (OUTPATIENT)
Dept: FAMILY MEDICINE CLINIC | Age: 81
End: 2021-11-17

## 2021-11-17 ENCOUNTER — NURSE ONLY (OUTPATIENT)
Dept: FAMILY MEDICINE CLINIC | Age: 81
End: 2021-11-17
Payer: MEDICARE

## 2021-11-17 DIAGNOSIS — I10 ESSENTIAL HYPERTENSION: Chronic | ICD-10-CM

## 2021-11-17 DIAGNOSIS — E87.5 HYPERKALEMIA: ICD-10-CM

## 2021-11-17 DIAGNOSIS — I10 ESSENTIAL HYPERTENSION: ICD-10-CM

## 2021-11-17 LAB
ANION GAP SERPL CALCULATED.3IONS-SCNC: 14 MMOL/L (ref 7–16)
BUN BLDV-MCNC: 19 MG/DL (ref 6–23)
CALCIUM SERPL-MCNC: 9 MG/DL (ref 8.6–10.2)
CHLORIDE BLD-SCNC: 105 MMOL/L (ref 98–107)
CHOLESTEROL, TOTAL: 231 MG/DL (ref 0–199)
CO2: 23 MMOL/L (ref 22–29)
CREAT SERPL-MCNC: 0.8 MG/DL (ref 0.5–1)
GFR AFRICAN AMERICAN: >60
GFR NON-AFRICAN AMERICAN: >60 ML/MIN/1.73
GLUCOSE BLD-MCNC: 88 MG/DL (ref 74–99)
HDLC SERPL-MCNC: 75 MG/DL
LDL CHOLESTEROL CALCULATED: 139 MG/DL (ref 0–99)
POTASSIUM SERPL-SCNC: 4.5 MMOL/L (ref 3.5–5)
SODIUM BLD-SCNC: 142 MMOL/L (ref 132–146)
TRIGL SERPL-MCNC: 87 MG/DL (ref 0–149)
VLDLC SERPL CALC-MCNC: 17 MG/DL

## 2021-11-17 PROCEDURE — 36415 COLL VENOUS BLD VENIPUNCTURE: CPT | Performed by: FAMILY MEDICINE

## 2021-11-17 NOTE — TELEPHONE ENCOUNTER
Called and talked with patient. Her labs are not back yet. Ed her on her previous labs. I will call her in the AM with the results of her new labs.

## 2021-11-17 NOTE — TELEPHONE ENCOUNTER
Patient saw results of her labs in chart, has some concerns that she would like to talk with someone about.

## 2021-11-17 NOTE — TELEPHONE ENCOUNTER
Called patient back; she was looking at at her results from 9/15/21 and was concerned about her potassium. I explained that is the reason she was here today, was to repeat the BMP. She was hoping for results. I did explain that she just had the labs done this morning and that it will be about 24 hours before we have results. Patient was very upset and confused about the labs in September vs. The labs today. I tried multiple times to explain that doctor reviewed the labs with her on 9/24/21 visit and that she was to repeat the BMP based on those results. I told patient that she would receive a call with results as soon as we got them.

## 2021-11-17 NOTE — TELEPHONE ENCOUNTER
Tried to call patient but no answer.   Not sure what results she is talking about as her labs are not even at the lab yet

## 2021-11-18 RX ORDER — LISINOPRIL 40 MG/1
40 TABLET ORAL DAILY
Qty: 90 TABLET | Refills: 1 | Status: SHIPPED
Start: 2021-11-18 | End: 2021-12-02

## 2021-12-01 ENCOUNTER — TELEPHONE (OUTPATIENT)
Dept: FAMILY MEDICINE CLINIC | Age: 81
End: 2021-12-01

## 2021-12-01 NOTE — TELEPHONE ENCOUNTER
Pt called in and states she is dizzy, lightheaded, unstable. This all happended after she stopped the medication that Dr Denise Turner prescribed her - sufzmethoxazole and cefdinir and ciprofloxacin for a UTI 14 days each.       Please advise

## 2021-12-01 NOTE — TELEPHONE ENCOUNTER
Talked with the patient. She woke up this AM and when she moved her head to get out of bed she had a bout of vertigo which made her feel very unsteady, weak and nauseated. She has been slowly recovering over the course of the day but still feels weak. She has been having feelings of dizziness and weakness she she took all of the antibiotics back in Sept but this is worse.   She will come in to see Dr. Sailaja whitt while I am precepting at 3pm.

## 2021-12-02 ENCOUNTER — OFFICE VISIT (OUTPATIENT)
Dept: FAMILY MEDICINE CLINIC | Age: 81
End: 2021-12-02
Payer: MEDICARE

## 2021-12-02 VITALS
HEIGHT: 61 IN | HEART RATE: 68 BPM | OXYGEN SATURATION: 100 % | BODY MASS INDEX: 22.86 KG/M2 | TEMPERATURE: 98 F | SYSTOLIC BLOOD PRESSURE: 126 MMHG | DIASTOLIC BLOOD PRESSURE: 58 MMHG | RESPIRATION RATE: 18 BRPM

## 2021-12-02 DIAGNOSIS — H81.10 BENIGN PAROXYSMAL POSITIONAL VERTIGO, UNSPECIFIED LATERALITY: Primary | ICD-10-CM

## 2021-12-02 DIAGNOSIS — F41.9 ANXIETY: ICD-10-CM

## 2021-12-02 DIAGNOSIS — I10 ESSENTIAL HYPERTENSION: ICD-10-CM

## 2021-12-02 PROCEDURE — 99214 OFFICE O/P EST MOD 30 MIN: CPT | Performed by: FAMILY MEDICINE

## 2021-12-02 RX ORDER — MECLIZINE HCL 12.5 MG/1
12.5 TABLET ORAL 3 TIMES DAILY PRN
Qty: 15 TABLET | Refills: 0 | Status: SHIPPED
Start: 2021-12-02 | End: 2021-12-12 | Stop reason: SDUPTHER

## 2021-12-02 RX ORDER — SERTRALINE HYDROCHLORIDE 25 MG/1
12.5 TABLET, FILM COATED ORAL DAILY
Qty: 30 TABLET | Refills: 1 | Status: SHIPPED
Start: 2021-12-02 | End: 2022-04-20

## 2021-12-02 RX ORDER — LISINOPRIL 40 MG/1
20 TABLET ORAL DAILY
Qty: 90 TABLET | Refills: 1 | Status: SHIPPED
Start: 2021-12-02 | End: 2021-12-07 | Stop reason: DRUGHIGH

## 2021-12-02 NOTE — PATIENT INSTRUCTIONS
Patient Education        Benign Paroxysmal Positional Vertigo (BPPV): Care Instructions  Your Care Instructions     Benign paroxysmal positional vertigo, also called BPPV, is an inner ear problem. It causes a spinning or whirling sensation when you move your head. This sensation is called vertigo. The vertigo usually lasts for less than a minute. People often have vertigo spells for a few days or weeks. Then the vertigo goes away. But it may come back again. The vertigo may be mild, or it may be bad enough to cause unsteadiness, nausea, and vomiting. When you move, your inner ear sends messages to the brain. This helps you keep your balance. Vertigo can happen when debris builds up in the inner ear. The buildup can cause the inner ear to send the wrong message to the brain. Your doctor may move you in different positions to help your vertigo get better faster. This is called the Epley maneuver. Your doctor may also prescribe medicines or exercises to help with your symptoms. Follow-up care is a key part of your treatment and safety. Be sure to make and go to all appointments, and call your doctor if you are having problems. It's also a good idea to know your test results and keep a list of the medicines you take. How can you care for yourself at home? · If your doctor suggests that you do Ochoa-Daroff exercises:  ? Sit on the edge of a bed or sofa. Quickly lie down on the side that causes the worst vertigo. Lie on your side with your ear down. ? Stay in this position for at least 30 seconds or until the vertigo goes away. ? Sit up. If this causes vertigo, wait for it to stop. ? Repeat the procedure on the other side. ? Repeat this 10 times. Do these exercises 2 times a day until the vertigo is gone. When should you call for help? Call 911 anytime you think you may need emergency care. For example, call if:    · You have symptoms of a stroke.  These may include:  ? Sudden numbness, tingling, weakness, or loss of movement in your face, arm, or leg, especially on only one side of your body. ? Sudden vision changes. ? Sudden trouble speaking. ? Sudden confusion or trouble understanding simple statements. ? Sudden problems with walking or balance. ? A sudden, severe headache that is different from past headaches. Call your doctor now or seek immediate medical care if:    · You have new or worse nausea and vomiting.     · You have new symptoms such as hearing loss or roaring in your ears. Watch closely for changes in your health, and be sure to contact your doctor if:    · You are not getting better as expected.     · Your vertigo gets worse. Where can you learn more? Go to https://True Link Financialpepiceweb.[employer]. org and sign in to your [employer] account. Enter  in the [employer] box to learn more about \"Benign Paroxysmal Positional Vertigo (BPPV): Care Instructions. \"     If you do not have an account, please click on the \"Sign Up Now\" link. Current as of: December 2, 2020               Content Version: 13.0  © 2006-2021 [employer]. Care instructions adapted under license by [facility] ([facility]). If you have questions about a medical condition or this instruction, always ask your healthcare professional. [place] [address] any warranty or liability for your use of this information. Patient Education        Cawthorne Exercises for Vertigo: Care Instructions  Your Care Instructions  Simple exercises can help you regain your balance when you have vertigo. If you have Ménière's disease, benign paroxysmal positional vertigo (BPPV), or another inner ear problem, you may have vertigo off and on. Do these exercises first thing in the morning and before you go to bed. You might get dizzy when you first start them. If this happens, try to do them for at least 5 minutes.  Do a group of exercises at a time, starting at the top of the list. It may take several weeks before you can do all the exercises without feeling dizzy. Follow-up care is a key part of your treatment and safety. Be sure to make and go to all appointments, and call your doctor if you are having problems. It's also a good idea to know your test results and keep a list of the medicines you take. How can you care for yourself at home? Exercise 1  While sitting on the side of the bed and holding your head still:  · Look up as far as you can. · Look down as far as you can. · Look from side to side as far as you can. · Stretch your arm straight out in front of you. Focus on your index finger. Continue to focus on your finger while you bring it to your nose. Exercise 2  While sitting on the side of the bed:  · Bring your head as far back as you can. · Bring your head forward to touch your chin to your chest.  · Turn your head from side to side. · Do these exercises first with your eyes open. Then try with your eyes closed. Exercise 3  While sitting on the side of the bed:  · Shrug your shoulders straight upward, then relax them. · Bend over and try to touch the ground with your fingers. Then go back to a sitting position. · Toss a small ball from one hand to the other. Throw the ball higher than your eyes so you have to look up. Exercise 4  While standing (with someone close by if you feel uncomfortable):  · Repeat Exercise 1.  · Repeat Exercise 2.  · Pass a ball between your legs and above your head. · Sit down and then stand up. Repeat. Turn around in a [place] a different way each time you stand. · With someone close by to help you, try the above exercises with your eyes closed. Exercise 5  In a room that is cleared of obstacles:  · Walk to a corner of the room, turn to your right, and walk back to the starting point. Now, repeat and turn left. · Walk up and down a slope. Now try stairs. · While holding on to someone's arm, try these exercises with your eyes closed.   When should you call for help?  Watch closely for changes in your health, and be sure to contact your doctor if:    · You do not get better as expected. Where can you learn more? Go to https://chpepiceweb.[employer]. org and sign in to your [employer] account. Enter Q448 in the [facility] box to learn more about \"Cawthorne Exercises for Vertigo: Care Instructions. \"     If you do not have an account, please click on the \"Sign Up Now\" link. Current as of: December 2, 2020               Content Version: 13.0  © 5474-5683 Healthwise, [employer]. Care instructions adapted under license by [facility] ([facility]). If you have questions about a medical condition or this instruction, always ask your healthcare professional. [address]  any warranty or liability for your use of this information.        [facility]  [address]  [phone]

## 2021-12-02 NOTE — PROGRESS NOTES
[facility]  Department of Family Medicine  Family Medicine Residency Program      Patient:  Karyna Veliz 78 y.o. female     Date of Service: 12/2/21      Chief complaint:   Chief Complaint   Patient presents with    Dizziness    Fatigue         History of Present Illness   The patient is a 78 y.o. female  presented to the clinic with complaints as above. Dizziness - Better than yesterday, woozy, weak, and yucky, Started about 2-3 days ago. Yesterday was the worst. Woke up and lifted head odd pillow and had extreme dizziness. Felt off when she woke up this morning. She feels as if the world is spinning around her. Feeling like she has trouble getting around, deeper breathing, no falls, off balance. No fever, chills, URI symptoms, chest pain, or shortness of breath. Caregiver anxiety - She has been caring for her  who has Parkinson's. She has quite a bit of stress from this. She is hesitant to take any medication at this time but realizes that this has been weighing on her. Past Medical History:      Diagnosis Date    Allergic rhinitis     Alopecia of scalp 9/3/2015    Anxiety 9/3/2014    Basal cell carcinoma of nose     left side    Breast CA ([facility] [address] 2001    right lumpectomy; DO NOT USE FOR BP,IV,VENIPUNVTURES PER PT    Constipated     postop hip replacement, impacted stool    Episodic low back pain     MRI 3/2010 normal    Essential hypertension 9/3/2015    Hyperlipidemia     Hypertension     pt states well controlled    Neck pain     Osteoarthritis 2/19/2014    Osteoarthritis 10/08/2014    Right knee on x-ray - [facility]    Osteoarthritis of both hips     Osteopenia     Urinary, incontinence, stress female 8/27/2015    Follows with Dr Xiang Rico. Advised surgery or pessary.        Past Surgical History:        Procedure Laterality Date    APPENDECTOMY      child    BREAST LUMPECTOMY  04/2000    [facility]ONOS[facility]  [facility]    JOINT REPLACEMENT Right 10/01/2012     Right Hip - [facility]    OTHER SURGICAL HISTORY Left 10/18/2013    full thickness skin graft, repair open nose defect - Dr. Yahir Black / Dr. Justo Raman       Allergies:    Aspirin, Penicillins, and Bactrim [sulfamethoxazole-trimethoprim]    Social History:   Social History     Socioeconomic History    Marital status:      Spouse name: Not on file    Number of children: Not on file    Years of education: Not on file    Highest education level: Not on file   Occupational History    Not on file   Tobacco Use    Smoking status: Never Smoker    Smokeless tobacco: Never Used   Substance and Sexual Activity    Alcohol use: Yes     Comment: OCCAS  WINE    Drug use: No    Sexual activity: Yes     Partners: Male   Other Topics Concern    Not on file   Social History Narrative    Has 3 children. 2 sons - [facility] and [facility]. Daughter lives here. 7 grandchildren. 2 great-grandchildren.  was teacher and . Was in the [employer] for a long time and they moved around. She is under stress taking care of her  at home. He had valve repair. Social Determinants of Health     Financial Resource Strain: Low Risk     Difficulty of Paying Living Expenses: Not hard at all   Food Insecurity: No Food Insecurity    Worried About Running Out of Food in the Last Year: Never true    [place] of Food in the Last Year: Never true   Transportation Needs:     Lack of Transportation (Medical): Not on file    Lack of Transportation (Non-Medical):  Not on file   Physical Activity:     Days of Exercise per Week: Not on file    Minutes of Exercise per Session: Not on file   Stress:     Feeling of Stress : Not on file   Social Connections:     Frequency of Communication with Friends and Family: Not on file    Frequency of Social Gatherings with Friends and Family: Not on file    Attends Jainism Services: Not on file   [employer] Group of Clubs or Organizations: Not on file    Attends Club or Organization Meetings: Not on file    Marital Status: Not on file   Intimate Partner Violence:     Fear of Current or Ex-Partner: Not on file    Emotionally Abused: Not on file    Physically Abused: Not on file    Sexually Abused: Not on file   Housing Stability:     Unable to Pay for Housing in the Last Year: Not on file    Number of [place] in the Last Year: Not on file    Unstable Housing in the Last Year: Not on file        Family History:   No family history on file. Review of Systems:   Review of Systems   Constitutional: Negative for chills, fatigue and fever. HENT: Negative for congestion, rhinorrhea and sore throat. Respiratory: Negative for cough, chest tightness and shortness of breath. Cardiovascular: Negative for chest pain and palpitations. Gastrointestinal: Negative for abdominal pain, constipation, diarrhea, nausea and vomiting. Genitourinary: Negative for dysuria and frequency. Neurological: Negative for dizziness and light-headedness. All other systems reviewed and are negative. Physical Exam   Vitals: BP (!) 126/58   Pulse 68   Temp 98 °F (36.7 °C)   Resp 18   Ht 5' 1\" (1.549 m)   SpO2 100%   BMI 22.86 kg/m²     BP Readings from Last 3 Encounters:   12/02/21 (!) 126/58   09/24/21 138/68   07/27/21 138/76       Physical Exam  Constitutional:       General: She is not in acute distress. Appearance: Normal appearance. HENT:      Head: Normocephalic and atraumatic. Mouth/Throat:      Mouth: Mucous membranes are moist.      Pharynx: Oropharynx is clear. Eyes:      Extraocular Movements: Extraocular movements intact. Conjunctiva/sclera: Conjunctivae normal.   Cardiovascular:      Rate and Rhythm: Normal rate and regular rhythm. Pulses: Normal pulses. Heart sounds: Normal heart sounds. No murmur heard.       Pulmonary:      Effort: Pulmonary effort is normal.      Breath sounds: agrees, feels comfortable with and wishes to proceed with above treatment plan. Call or go to ED immediately if symptoms worsen or persist. Advised patient to call with any new medication issues, and, as applicable, read all Rx info from pharmacy to assure aware of all possible risks and side effects of medication before taking. Patient and/or guardian given opportunity to ask questions/raise concerns. The patient verbalized comfort and understanding of instructions. I encourage further reading and education about your health conditions. Information on many health conditions is provided by the American Academy of Family Physicians: https://familydoctor. org/  Please bring any questions to me at your next visit. Return to Office: Return in about 1 month (around 1/2/2022). Medication List:    Current Outpatient Medications   Medication Sig Dispense Refill    meclizine (ANTIVERT) 12.5 MG tablet Take 1 tablet by mouth 3 times daily as needed for Dizziness 15 tablet 0    lisinopril (PRINIVIL;ZESTRIL) 40 MG tablet Take 0.5 tablets by mouth daily 90 tablet 1    sertraline (ZOLOFT) 25 MG tablet Take 0.5 tablets by mouth daily 30 tablet 1    Oxyquinoline-Sod Lauryl Sulf (TRIMO-MAR) 0.025-0.01 % GEL Place 1 applicator vaginally twice a week 1 each 0    atorvastatin (LIPITOR) 10 MG tablet TAKE ONE TABLET BY MOUTH EVERY NIGHT 90 tablet 0     No current facility-administered medications for this visit. Joy Jones, DO       This document may have been prepared at least partially through the use of voice recognition software. Although effort is taken to assure the accuracy of this document, it is possible that grammatical, syntax, or spelling errors may occur.

## 2021-12-02 NOTE — PROGRESS NOTES
S: 78 y.o. female with   Chief Complaint   Patient presents with    Dizziness    Fatigue       Pt has had 2-3 days of woosiness, fatigue and some weakness. She felt actual dizziness yesterday. Today she feels a bit better. Dizziness was there today but was better. Feels off balance. Has not had any falls. No URI symptoms. O: VS:  height is 5' 1\" (1.549 m). Her temperature is 98 °F (36.7 °C). Her blood pressure is 126/58 (abnormal) and her pulse is 68. Her respiration is 18 and oxygen saturation is 100%. BP Readings from Last 3 Encounters:   12/02/21 (!) 126/58   09/24/21 138/68   07/27/21 138/76     See resident note      Impression/Plan:   1) BPPV - give script for PT. If not better by Monday, suggest starting. Exercises given. Meclizine given. Pt ed. 2) anxiety - situational.  Daughter is here and has done research on support groups for families with Parkinsons. Pt willing to start low dose zoloft. Health Maintenance Due   Topic Date Due    DTaP/Tdap/Td vaccine (1 - Tdap) Never done    Shingles Vaccine (2 of 3) 12/27/2014         Attending Physician Statement  I have discussed the case, including pertinent history and exam findings with the resident. I also have seen the patient and performed key portions of the examination. I agree with the documented assessment and plan.       Awais Christianson MD

## 2021-12-03 ASSESSMENT — ENCOUNTER SYMPTOMS
DIARRHEA: 0
VOMITING: 0
CHEST TIGHTNESS: 0
COUGH: 0
SHORTNESS OF BREATH: 0
CONSTIPATION: 0
ABDOMINAL PAIN: 0
NAUSEA: 0
SORE THROAT: 0
RHINORRHEA: 0

## 2021-12-07 DIAGNOSIS — I10 ESSENTIAL HYPERTENSION: ICD-10-CM

## 2021-12-07 RX ORDER — LISINOPRIL 20 MG/1
20 TABLET ORAL DAILY
Qty: 90 TABLET | Refills: 1 | Status: SHIPPED
Start: 2021-12-07 | End: 2022-04-20

## 2021-12-12 DIAGNOSIS — H81.10 BENIGN PAROXYSMAL POSITIONAL VERTIGO, UNSPECIFIED LATERALITY: ICD-10-CM

## 2021-12-12 RX ORDER — MECLIZINE HCL 12.5 MG/1
12.5 TABLET ORAL 3 TIMES DAILY PRN
Qty: 15 TABLET | Refills: 0 | Status: SHIPPED
Start: 2021-12-12 | End: 2021-12-20 | Stop reason: SDUPTHER

## 2021-12-12 NOTE — PROGRESS NOTES
Patient called in stating she was almost out of medication (antivert) for BPPV. She states medicine has been helping her. She wanted a prescription refill because on a couple days were given. She does not see PT for the inner ear treatment until Wednesday. I called in a refill of the antivert to her pharmacy. I encouraged her to make a follow up at [facility] either Thursday or Friday after her PT treatment to evaluate whether she needs further prescription or possible ENT consult. I informed her that if her symptoms worsen while on the meclizine or fever, cough, syncopal episodes, lightheadedness or dizziness occur to then go to the ED for further evaluation.

## 2021-12-14 ENCOUNTER — TELEPHONE (OUTPATIENT)
Dept: FAMILY MEDICINE CLINIC | Age: 81
End: 2021-12-14

## 2021-12-14 NOTE — TELEPHONE ENCOUNTER
Called daughter back. Her forgetfulness is getting worse. She has been getting dizzy almost every morning. She is 126/58 on the meds. She is refusing to take the zoloft but is having crying spells. Again, worse in the AM.    It is now more dizziness and weakness not as much vertigo. Patient is very, very anxious. Daughter asked about memory pills. Ed her on aricept and namenda and how they work. They will try off the lisiniopril and see how her BP does. Ed daughter that it can comfortably go up to [phone] systlolic and that is ok for a time.    Therapy for dizziness is tmw.   lnw

## 2021-12-20 ENCOUNTER — TELEPHONE (OUTPATIENT)
Dept: FAMILY MEDICINE CLINIC | Age: 81
End: 2021-12-20

## 2021-12-20 DIAGNOSIS — H81.10 BENIGN PAROXYSMAL POSITIONAL VERTIGO, UNSPECIFIED LATERALITY: ICD-10-CM

## 2021-12-20 RX ORDER — MECLIZINE HCL 12.5 MG/1
12.5 TABLET ORAL 3 TIMES DAILY PRN
Qty: 30 TABLET | Refills: 0 | Status: SHIPPED | OUTPATIENT
Start: 2021-12-20 | End: 2021-12-30

## 2021-12-20 RX ORDER — BIOTIN 5 MG
TABLET ORAL
COMMUNITY

## 2021-12-20 RX ORDER — ESTRADIOL 0.1 MG/G
CREAM VAGINAL
COMMUNITY
Start: 2021-09-27 | End: 2022-03-29

## 2022-01-18 ENCOUNTER — TELEPHONE (OUTPATIENT)
Dept: FAMILY MEDICINE CLINIC | Age: 82
End: 2022-01-18

## 2022-01-18 DIAGNOSIS — H81.10 BENIGN PAROXYSMAL POSITIONAL VERTIGO, UNSPECIFIED LATERALITY: Primary | ICD-10-CM

## 2022-01-18 NOTE — TELEPHONE ENCOUNTER
Pt called about her diziness and states that when she went to [facility] they suggested she go to an ENT Dr to see if they can find something. Pt states she cannot live with this dizziness and really would like you to please call her.     [phone]    Please advise

## 2022-01-19 RX ORDER — MECLIZINE HYDROCHLORIDE 25 MG/1
25 TABLET ORAL 3 TIMES DAILY PRN
Qty: 30 TABLET | Refills: 0 | Status: SHIPPED
Start: 2022-01-19 | End: 2022-03-10 | Stop reason: SDUPTHER

## 2022-01-19 NOTE — TELEPHONE ENCOUNTER
She is still feeling dizzy. She is taking the meclizine every day or she feels dizzy when she gets up. It goes away completely if she takes the medicine and it is gone by afternoon. PT is not helping. PT asked her to see ENT. Referral made to Dr. Heaven Vasquez.

## 2022-03-06 DIAGNOSIS — E78.2 MIXED HYPERLIPIDEMIA: ICD-10-CM

## 2022-03-07 RX ORDER — ATORVASTATIN CALCIUM 10 MG/1
TABLET, FILM COATED ORAL
Qty: 90 TABLET | Refills: 0 | Status: SHIPPED
Start: 2022-03-07 | End: 2022-03-30

## 2022-03-10 ENCOUNTER — TELEPHONE (OUTPATIENT)
Dept: FAMILY MEDICINE CLINIC | Age: 82
End: 2022-03-10

## 2022-03-10 DIAGNOSIS — H81.10 BENIGN PAROXYSMAL POSITIONAL VERTIGO, UNSPECIFIED LATERALITY: ICD-10-CM

## 2022-03-10 DIAGNOSIS — I10 ESSENTIAL HYPERTENSION: ICD-10-CM

## 2022-03-10 DIAGNOSIS — E78.2 MIXED HYPERLIPIDEMIA: Primary | ICD-10-CM

## 2022-03-10 RX ORDER — MECLIZINE HYDROCHLORIDE 25 MG/1
25 TABLET ORAL 3 TIMES DAILY PRN
Qty: 30 TABLET | Refills: 0 | Status: SHIPPED
Start: 2022-03-10 | End: 2022-04-25

## 2022-03-10 NOTE — TELEPHONE ENCOUNTER
----- Message from Rumalda Links sent at 3/10/2022 11:51 AM EST -----  Subject: Message to Provider    QUESTIONS  Information for Provider? Pt sees Dr. Bridget Gandhi & has an appt on 3/29 and   wants to verify that the current lab orders she has are the only ones she   needs leading up to the appt. Pt wants to ensure it's a complete work up &   also would like a call to verify what time of day she can come in to do   the labs.   ---------------------------------------------------------------------------  --------------  [address]  What is the best way for the office to contact you? OK to leave message on   voicemail  Preferred Call Back Phone Number?  [phone]  ---------------------------------------------------------------------------  --------------  SCRIPT ANSWERS  undefined

## 2022-03-10 NOTE — TELEPHONE ENCOUNTER
Last Appointment   12/2/2021  Next Appointment  3/29/2022    Patient states that this is an emergency because she has family coming into town and she needs to take this every day. Dr Shahnaz Byrd will not prescribe because he states that there is nothing wrong with her ears.

## 2022-03-10 NOTE — TELEPHONE ENCOUNTER
I added a few labs to her list. She can let them know when she comes to get her labs drawn. Appreciate that she called.   danilo

## 2022-03-23 ENCOUNTER — NURSE TRIAGE (OUTPATIENT)
Dept: OTHER | Facility: CLINIC | Age: 82
End: 2022-03-23

## 2022-03-23 DIAGNOSIS — E78.2 MIXED HYPERLIPIDEMIA: ICD-10-CM

## 2022-03-23 DIAGNOSIS — R31.9 URINARY TRACT INFECTION WITH HEMATURIA, SITE UNSPECIFIED: ICD-10-CM

## 2022-03-23 DIAGNOSIS — N39.0 URINARY TRACT INFECTION WITH HEMATURIA, SITE UNSPECIFIED: Primary | ICD-10-CM

## 2022-03-23 DIAGNOSIS — R31.9 URINARY TRACT INFECTION WITH HEMATURIA, SITE UNSPECIFIED: Primary | ICD-10-CM

## 2022-03-23 DIAGNOSIS — I10 ESSENTIAL HYPERTENSION: ICD-10-CM

## 2022-03-23 DIAGNOSIS — N39.0 URINARY TRACT INFECTION WITH HEMATURIA, SITE UNSPECIFIED: ICD-10-CM

## 2022-03-23 LAB
ALBUMIN SERPL-MCNC: 4.4 G/DL (ref 3.5–5.2)
ALP BLD-CCNC: 81 U/L (ref 35–104)
ALT SERPL-CCNC: 10 U/L (ref 0–32)
ANION GAP SERPL CALCULATED.3IONS-SCNC: 10 MMOL/L (ref 7–16)
AST SERPL-CCNC: 24 U/L (ref 0–31)
BACTERIA: ABNORMAL /HPF
BASOPHILS ABSOLUTE: 0.05 E9/L (ref 0–0.2)
BASOPHILS RELATIVE PERCENT: 1 % (ref 0–2)
BILIRUB SERPL-MCNC: 0.6 MG/DL (ref 0–1.2)
BILIRUBIN URINE: NEGATIVE
BLOOD, URINE: ABNORMAL
BUN BLDV-MCNC: 24 MG/DL (ref 6–23)
CALCIUM SERPL-MCNC: 9 MG/DL (ref 8.6–10.2)
CHLORIDE BLD-SCNC: 105 MMOL/L (ref 98–107)
CLARITY: CLEAR
CO2: 29 MMOL/L (ref 22–29)
COLOR: YELLOW
CREAT SERPL-MCNC: 0.8 MG/DL (ref 0.5–1)
EOSINOPHILS ABSOLUTE: 0.16 E9/L (ref 0.05–0.5)
EOSINOPHILS RELATIVE PERCENT: 3.1 % (ref 0–6)
GFR AFRICAN AMERICAN: >60
GFR NON-AFRICAN AMERICAN: >60 ML/MIN/1.73
GLUCOSE BLD-MCNC: 91 MG/DL (ref 74–99)
GLUCOSE URINE: NEGATIVE MG/DL
HCT VFR BLD CALC: 40.4 % (ref 34–48)
HEMOGLOBIN: 13.1 G/DL (ref 11.5–15.5)
IMMATURE GRANULOCYTES #: 0.01 E9/L
IMMATURE GRANULOCYTES %: 0.2 % (ref 0–5)
KETONES, URINE: NEGATIVE MG/DL
LEUKOCYTE ESTERASE, URINE: ABNORMAL
LYMPHOCYTES ABSOLUTE: 1.8 E9/L (ref 1.5–4)
LYMPHOCYTES RELATIVE PERCENT: 34.5 % (ref 20–42)
MCH RBC QN AUTO: 29 PG (ref 26–35)
MCHC RBC AUTO-ENTMCNC: 32.4 % (ref 32–34.5)
MCV RBC AUTO: 89.4 FL (ref 80–99.9)
MONOCYTES ABSOLUTE: 0.57 E9/L (ref 0.1–0.95)
MONOCYTES RELATIVE PERCENT: 10.9 % (ref 2–12)
NEUTROPHILS ABSOLUTE: 2.63 E9/L (ref 1.8–7.3)
NEUTROPHILS RELATIVE PERCENT: 50.3 % (ref 43–80)
NITRITE, URINE: NEGATIVE
PDW BLD-RTO: 13.4 FL (ref 11.5–15)
PH UA: 6 (ref 5–9)
PLATELET # BLD: 193 E9/L (ref 130–450)
PMV BLD AUTO: 9.5 FL (ref 7–12)
POTASSIUM SERPL-SCNC: 4.1 MMOL/L (ref 3.5–5)
PROTEIN UA: NEGATIVE MG/DL
RBC # BLD: 4.52 E12/L (ref 3.5–5.5)
RBC UA: ABNORMAL /HPF (ref 0–2)
RENAL EPITHELIAL, UA: ABNORMAL /HPF
SODIUM BLD-SCNC: 144 MMOL/L (ref 132–146)
SPECIFIC GRAVITY UA: 1.02 (ref 1–1.03)
TOTAL PROTEIN: 7.2 G/DL (ref 6.4–8.3)
UROBILINOGEN, URINE: 0.2 E.U./DL
WBC # BLD: 5.2 E9/L (ref 4.5–11.5)
WBC UA: ABNORMAL /HPF (ref 0–5)

## 2022-03-26 LAB
ORGANISM: ABNORMAL
URINE CULTURE, ROUTINE: ABNORMAL

## 2022-03-28 ENCOUNTER — TELEPHONE (OUTPATIENT)
Dept: FAMILY MEDICINE CLINIC | Age: 82
End: 2022-03-28

## 2022-03-28 NOTE — TELEPHONE ENCOUNTER
Called and talked with patient. She understands that she does not have a UTI. Is still having some dizziness. Is coming tmw for her appt.

## 2022-03-28 NOTE — TELEPHONE ENCOUNTER
----- Message from Kailash Gan sent at 3/23/2022  5:16 PM EDT -----  Subject: Message to Provider    QUESTIONS  Information for Provider? ###HIGH PRIORITY### Pt was told she has UTI. Want to know the plan of care for UTI. Please call before 1:00 Pm   ---------------------------------------------------------------------------  --------------  CALL BACK INFO  What is the best way for the office to contact you? OK to leave message on   voicemail, OK to respond with electronic message via [employer] portal (only   for patients who have registered [employer] account)  Preferred Call Back Phone Number? [phone]  ---------------------------------------------------------------------------  --------------  SCRIPT ANSWERS  Relationship to Patient? Third Party  Representative Name?  Nurse Triage

## 2022-03-28 NOTE — TELEPHONE ENCOUNTER
I called and talked with the patient's . She was out. Let him know that since she did not have symptoms she did not have a UTI. I would call and talk with her later today. I am seeing her tmw in clinic.

## 2022-03-29 ENCOUNTER — TELEPHONE (OUTPATIENT)
Dept: ADMINISTRATIVE | Age: 82
End: 2022-03-29

## 2022-03-29 ENCOUNTER — OFFICE VISIT (OUTPATIENT)
Dept: FAMILY MEDICINE CLINIC | Age: 82
End: 2022-03-29
Payer: MEDICARE

## 2022-03-29 VITALS
WEIGHT: 129 LBS | HEART RATE: 69 BPM | DIASTOLIC BLOOD PRESSURE: 75 MMHG | SYSTOLIC BLOOD PRESSURE: 160 MMHG | BODY MASS INDEX: 24.37 KG/M2 | OXYGEN SATURATION: 94 % | RESPIRATION RATE: 16 BRPM | TEMPERATURE: 98.6 F

## 2022-03-29 DIAGNOSIS — Z01.818 PRE-OP EVALUATION: ICD-10-CM

## 2022-03-29 DIAGNOSIS — E78.2 MIXED HYPERLIPIDEMIA: ICD-10-CM

## 2022-03-29 DIAGNOSIS — I10 ESSENTIAL HYPERTENSION: ICD-10-CM

## 2022-03-29 DIAGNOSIS — R09.89 BRUIT OF LEFT CAROTID ARTERY: ICD-10-CM

## 2022-03-29 DIAGNOSIS — H81.10 BENIGN PAROXYSMAL POSITIONAL VERTIGO, UNSPECIFIED LATERALITY: ICD-10-CM

## 2022-03-29 DIAGNOSIS — F41.9 ANXIETY: Chronic | ICD-10-CM

## 2022-03-29 PROCEDURE — 99214 OFFICE O/P EST MOD 30 MIN: CPT | Performed by: FAMILY MEDICINE

## 2022-03-29 NOTE — PATIENT INSTRUCTIONS
Restart the lisiniopril at 1/2 a pill (10mg) for 1 week. Then take the BP and if still 140/90 or above, take 1 pill a day (20mg). Call with the BP results. Start the zoloft at 1/2 pill a day for 2 weeks. If no side effects, increase to 1 pill a day.

## 2022-03-29 NOTE — PROGRESS NOTES
year that she had one and she took multiple meds and had terrible reactions. Does not want to go through that again. Uses a pessary. No issues with this. Still having some dizziness. Does ok on meclizine daily. She takes in the AM.   She is getting the symptoms right when she gets up. At first the immediate dizziness subsided with PT. Still has woozyness when she gets up in the AM.  She is off balance. Daugther does not feel that she is getting better. She gets more easily upset. Saw ENT who stated there was nothing wrong. She saw PT. The therapy did not much but did help with the severe vertigo. Saw audiology and they did VNG - determined to be normal.  Did have some nystagmus but is was determined to be normal.   Has not seen cardiology. Had ECG done 9/21 which did not show changes. Some orthostatic symptoms. No s/s of stroke. Symptoms come and go. Negative sham[place]    ? Taking zoloft? She did not start it. Still very, very anxious per daughter. Can't relax. She is willing to try it at this point. Taking care of her . Daughter helps but is going to get a home aid to help out. Pt is not taking her lisinopril. Saw that one of the side effects was dizziness so she stopped it. She does not take her BP at home. Review labs. CMP and CBC normal. Had UA and culture done. Lipids done last Nov.  High HDL, high LDL - on low dose statin. Review of Systems   Respiratory: Negative for cough, chest tightness, shortness of breath and wheezing. Cardiovascular: Negative for chest pain, palpitations and leg swelling. Gastrointestinal: Negative for abdominal pain, constipation and diarrhea. Genitourinary: Negative for decreased urine volume, difficulty urinating, dysuria, flank pain, frequency and urgency. Neurological: Positive for dizziness and light-headedness. Negative for tremors, syncope, speech difficulty, weakness and headaches. Psychiatric/Behavioral: Positive for decreased concentration and dysphoric mood. The patient is nervous/anxious. Objective   Physical Exam  Vitals and nursing note reviewed. Constitutional:       Appearance: Normal appearance. She is well-developed. She is not ill-appearing or toxic-appearing. HENT:      Head: Normocephalic and atraumatic. Eyes:      General: No scleral icterus. Right eye: No discharge. Left eye: No discharge. Neck:      Thyroid: No thyroid mass, thyromegaly or thyroid tenderness. Vascular: Carotid bruit present. Comments: Left side bruit louder than right  Cardiovascular:      Rate and Rhythm: Normal rate and regular rhythm. Heart sounds: Normal heart sounds, S1 normal and S2 normal. No murmur heard. No friction rub. No gallop. Pulmonary:      Effort: Pulmonary effort is normal. No respiratory distress. Breath sounds: Normal breath sounds and air entry. No decreased breath sounds, wheezing or rales. Abdominal:      Palpations: Abdomen is soft. Tenderness: There is no abdominal tenderness. Musculoskeletal:      Cervical back: Neck supple. Right lower leg: No edema. Left lower leg: No edema. Lymphadenopathy:      Cervical: No cervical adenopathy. Skin:     General: Skin is warm and dry. Neurological:      General: No focal deficit present. Mental Status: She is alert and oriented to person, place, and time. Gait: Gait normal.   Psychiatric:         Attention and Perception: Attention and perception normal.         Mood and Affect: Mood is anxious. Cognition and Memory: Memory normal.                  An electronic signature was used to authenticate this note.     --Megan Pascual MD

## 2022-03-29 NOTE — TELEPHONE ENCOUNTER
[facility]"  see's [facility]. Patient Appointment Form:      PCP: Dr. Judith Fabian  Referring: Dr. Judith Fabian    Has the Patient:    Seen a Cardiologist? No    Had a heart catheterization? No    Had heart surgery? No    Had a stress test or nuclear stress test? No    Had an echocardiogram? No    Had a vascular ultrasound? Carotid U/S future order - number to pt to schedule     Had a 24/48 heart monitor or extended cardiac event monitor? No    Had recent blood work in the last 6 months? Yes some 3/23/22 and few 11/17/21 Epic PCP     Had a pacemaker/ICD/ILR implant?  No    Seen an Electrophysiologist? No        Will send records via: No prior cardiology hx or recs - PCP recs in Epic       Date & time of appointment:  5/27/22 @ 11:20 with \"Dr. Ruthie Hanna"

## 2022-03-30 RX ORDER — ATORVASTATIN CALCIUM 10 MG/1
5 TABLET, FILM COATED ORAL DAILY
Qty: 1 TABLET | Refills: 0
Start: 2022-03-30 | End: 2022-04-29

## 2022-03-30 ASSESSMENT — ENCOUNTER SYMPTOMS
CHEST TIGHTNESS: 0
SHORTNESS OF BREATH: 0
ABDOMINAL PAIN: 0
DIARRHEA: 0
CONSTIPATION: 0
WHEEZING: 0
COUGH: 0

## 2022-04-19 ENCOUNTER — HOSPITAL ENCOUNTER (OUTPATIENT)
Dept: ULTRASOUND IMAGING | Age: 82
Discharge: HOME | End: 2022-04-21
Payer: MEDICARE

## 2022-04-19 DIAGNOSIS — H81.10 BENIGN PAROXYSMAL POSITIONAL VERTIGO, UNSPECIFIED LATERALITY: ICD-10-CM

## 2022-04-19 DIAGNOSIS — R09.89 BRUIT OF LEFT CAROTID ARTERY: ICD-10-CM

## 2022-04-19 PROCEDURE — 93880 EXTRACRANIAL BILAT STUDY: CPT

## 2022-04-20 ENCOUNTER — TELEPHONE (OUTPATIENT)
Dept: FAMILY MEDICINE CLINIC | Age: 82
End: 2022-04-20

## 2022-04-20 DIAGNOSIS — F41.9 ANXIETY: ICD-10-CM

## 2022-04-20 RX ORDER — SERTRALINE HYDROCHLORIDE 25 MG/1
12.5 TABLET, FILM COATED ORAL DAILY
Qty: 30 TABLET | Refills: 1
Start: 2022-04-20 | End: 2022-04-29

## 2022-04-20 RX ORDER — LISINOPRIL 20 MG/1
20 TABLET ORAL DAILY
Qty: 90 TABLET | Refills: 1
Start: 2022-04-20 | End: 2022-08-26 | Stop reason: SDUPTHER

## 2022-04-20 NOTE — TELEPHONE ENCOUNTER
Surgery on for May 2nd they need medical clearance for Dr Feroz Russell in [place], patient was here on 3-29 does she need to be seen again?

## 2022-04-20 NOTE — TELEPHONE ENCOUNTER
Called and talked with patient. Gave her the results of her carotid US. Stenosis less than 50 percent -- Patients with asymptomatic internal carotid artery atherosclerotic disease who have <50 percent carotid stenosis do not require carotid revascularization. However, such patients should be screened for treatable risk factors for stroke and cardiovascular disease, with institution of appropriate lifestyle changes and medical therapies. Annual carotid duplex surveillance to evaluate for plaque progression may be reasonable    Has forward-going flow in both vertebral arteries - not the reason for her dizziness. Needs statin (yes) only taking 5mg lipitor. Willing to take 10mg daily  ASA - allergic to ASA  DM control - no DM. HTN control - She is very stressed and has not taken in a few days and can't remember where she put her results from last week. Did she start her zoloft? She will start tomorrow taking the whole pill. She is currently taking   Did she restart her lisinopril? She started it. I will call next week and get results of her BP so we can adjust her meds.      Seeing cards 5/27/22

## 2022-04-20 NOTE — TELEPHONE ENCOUNTER
I can do cardiac assessment on the patient. Please send my most recent note and the phone note below with cardiac risk assessment to the orthopedic surgeon  Thanks.

## 2022-04-25 DIAGNOSIS — H81.10 BENIGN PAROXYSMAL POSITIONAL VERTIGO, UNSPECIFIED LATERALITY: ICD-10-CM

## 2022-04-25 RX ORDER — MECLIZINE HYDROCHLORIDE 25 MG/1
TABLET ORAL
Qty: 30 TABLET | Refills: 2 | Status: SHIPPED | OUTPATIENT
Start: 2022-04-25

## 2022-04-29 DIAGNOSIS — E78.2 MIXED HYPERLIPIDEMIA: ICD-10-CM

## 2022-04-29 DIAGNOSIS — F41.9 ANXIETY: ICD-10-CM

## 2022-04-29 RX ORDER — ATORVASTATIN CALCIUM 10 MG/1
TABLET, FILM COATED ORAL
Qty: 90 TABLET | Refills: 0 | Status: SHIPPED
Start: 2022-04-29 | End: 2022-08-29

## 2022-04-29 RX ORDER — SERTRALINE HYDROCHLORIDE 25 MG/1
TABLET, FILM COATED ORAL
Qty: 90 TABLET | Refills: 0 | Status: SHIPPED
Start: 2022-04-29 | End: 2022-06-10 | Stop reason: SDUPTHER

## 2022-05-17 ENCOUNTER — TELEPHONE (OUTPATIENT)
Dept: FAMILY MEDICINE CLINIC | Age: 82
End: 2022-05-17

## 2022-05-17 NOTE — TELEPHONE ENCOUNTER
This will not likely be covered by her insurance. Please start by taking a combination of colace and miralax. These can be bought over the counter. Titrate the miralax until Nellie Landa is able to go to the bathroom. It may take a few days for this to work.

## 2022-05-17 NOTE — TELEPHONE ENCOUNTER
Patient Daughter Dunia Powers called patient just came home from total knee placement, taking oxycodone on and off that is causing constipation- wanted to know if we could call in linzess to [employer].

## 2022-06-01 ENCOUNTER — TELEPHONE (OUTPATIENT)
Dept: FAMILY MEDICINE CLINIC | Age: 82
End: 2022-06-01

## 2022-06-01 NOTE — TELEPHONE ENCOUNTER
Alexa @ [facility] on behalf of Evaristo Tellez is calling stating that their records show the pt has not filled her lipitor 10mg since 3/7. They wanted to make Dr Boston Gilman aware of this as they feel she's being non compliant with this medication. Stated they tried to reach pt to find out if she is taking medication and she did not answer.         #: [phone]  Reference # [id number]

## 2022-06-02 ENCOUNTER — TELEPHONE (OUTPATIENT)
Dept: FAMILY MEDICINE CLINIC | Age: 82
End: 2022-06-02

## 2022-06-02 NOTE — TELEPHONE ENCOUNTER
Daughter states pt has been having verbal/rude outbursts, a lot of crying, is depressed, upset, has high anxiety. Daughter is wondering if pt needs to be seen again to evaluate or if there can be an increase for zoloft dosage to help this.  Please advise

## 2022-06-03 NOTE — TELEPHONE ENCOUNTER
Please call patient's daughter. I would like to see the patient on Tuesday at 3pm if this is possible.   danilo

## 2022-06-06 NOTE — TELEPHONE ENCOUNTER
Dr Irvin Leblanc, Tuesday at 3 will not work for pt daughter, any other day/time available? Or can schedule at your first available if ok with you?

## 2022-06-09 NOTE — PROGRESS NOTES
Pebbles Mckenzie (:  1940) is a 78 y.o. female,Established patient, here for evaluation of the following chief complaint(s):  Dementia (work up )         ASSESSMENT/PLAN:  1. Anxiety and depression  -     CBC with Auto Differential; Future  -     Vitamin B12 & Folate; Future  -     Comprehensive Metabolic Panel; Future  -     TSH; Future  Labs ordered. Testing done today. Pt likely depressed as well as anxious. Zoloft increased. If this does not help, will lower zoloft and start very low dose abilify. 2. Memory change  Labs ordered. If zoloft does not help with short term memory loss, may need to start aricept. 3. Acute pain of right knee  -     oxyCODONE-acetaminophen (PERCOCET) 5-325 MG per tablet; Take 1 tablet by mouth every 6 hours as needed for Pain for up to 3 days. Intended supply: 3 days. Take lowest dose possible to manage pain, Disp-12 tablet, R-0Normal  Pt ed to take very sparingly. Pt and daughter ed to let ortho know that I have given them this. 4. Anemia, unspecified type  -     CBC with Auto Differential; Future      Return in about 6 weeks (around 2022) for anxiety. Subjective   SUBJECTIVE/OBJECTIVE:  HPI     Prev - needs AWV end of . She had a right knee replacement. Her pain is being controlled with tylenol but it is not working completely at night. She gets up around 3-4 am and has so much pain that she can't get back to sleep. 1/2 of her narcotic pain med seems to help. She uses it a few times a week when it is really bad. Daughter is worried about her short term memory. Having a hard time bc she doesn't like having to rely on home care to come to the house. Has 3 different people who come. It is a lot every day. She can't drive, can't run her household. Her anxiety is really bad. Loses temper very quickly. She is avoiding thinking about giving up her home. Not ready to think about this yet.   Daughter has asked about it.  Daughter is very supportive. Sleeps at the house 4 nights a week. Is there during the day 3 days a week.  has an appetite. Wants his meals. She is cooking meals at night. Do geriatric depression screen - done today  Mini-cog - not done  Mini-mental status- done today  [place] cognitive assessment - not done    Zoloft? She is taking. May not be enough. See above. Had knee replaced. Is feeling better than it was before the surgery. She is doing PT. Review of Systems - see geriatric depression screening. Objective   Physical Exam - see geriatric depression screen, depression screening and MMSE       On this date 6/10/2022 I have spent 45 minutes reviewing previous notes, test results and face to face with the patient discussing the diagnosis and importance of compliance with the treatment plan as well as documenting on the day of the visit. On the basis of positive PHQ-9 screening (PHQ-9 Total Score: 2), the following plan was implemented: additional evaluation and assessment performed and meds adjusted. Patient will follow-up in 4 week(s) with PCP. An electronic signature was used to authenticate this note.     --Domingo Huynh MD

## 2022-06-10 ENCOUNTER — OFFICE VISIT (OUTPATIENT)
Dept: FAMILY MEDICINE CLINIC | Age: 82
End: 2022-06-10
Payer: MEDICARE

## 2022-06-10 VITALS
WEIGHT: 123 LBS | DIASTOLIC BLOOD PRESSURE: 61 MMHG | HEIGHT: 61 IN | TEMPERATURE: 97 F | HEART RATE: 74 BPM | SYSTOLIC BLOOD PRESSURE: 127 MMHG | BODY MASS INDEX: 23.22 KG/M2 | RESPIRATION RATE: 16 BRPM | OXYGEN SATURATION: 96 %

## 2022-06-10 DIAGNOSIS — D64.9 ANEMIA, UNSPECIFIED TYPE: ICD-10-CM

## 2022-06-10 DIAGNOSIS — F32.9 CURRENT EPISODE OF MAJOR DEPRESSIVE DISORDER WITHOUT PRIOR EPISODE, UNSPECIFIED DEPRESSION EPISODE SEVERITY: ICD-10-CM

## 2022-06-10 DIAGNOSIS — F41.9 ANXIETY: ICD-10-CM

## 2022-06-10 DIAGNOSIS — M25.561 ACUTE PAIN OF RIGHT KNEE: ICD-10-CM

## 2022-06-10 DIAGNOSIS — R41.3 MEMORY CHANGE: ICD-10-CM

## 2022-06-10 PROCEDURE — 1123F ACP DISCUSS/DSCN MKR DOCD: CPT | Performed by: FAMILY MEDICINE

## 2022-06-10 PROCEDURE — 99215 OFFICE O/P EST HI 40 MIN: CPT | Performed by: FAMILY MEDICINE

## 2022-06-10 RX ORDER — SERTRALINE HYDROCHLORIDE 25 MG/1
25 TABLET, FILM COATED ORAL 2 TIMES DAILY
Qty: 180 TABLET | Refills: 0 | Status: SHIPPED
Start: 2022-06-10 | End: 2022-08-26 | Stop reason: SDUPTHER

## 2022-06-10 RX ORDER — OXYCODONE HYDROCHLORIDE AND ACETAMINOPHEN 5; 325 MG/1; MG/1
1 TABLET ORAL EVERY 6 HOURS PRN
Qty: 12 TABLET | Refills: 0 | Status: SHIPPED | OUTPATIENT
Start: 2022-06-10 | End: 2022-06-13

## 2022-06-10 ASSESSMENT — PATIENT HEALTH QUESTIONNAIRE - PHQ9
2. FEELING DOWN, DEPRESSED OR HOPELESS: 1
SUM OF ALL RESPONSES TO PHQ QUESTIONS 1-9: 2
SUM OF ALL RESPONSES TO PHQ9 QUESTIONS 1 & 2: 2
1. LITTLE INTEREST OR PLEASURE IN DOING THINGS: 1
SUM OF ALL RESPONSES TO PHQ QUESTIONS 1-9: 2

## 2022-06-10 ASSESSMENT — MINI MENTAL STATE EXAM
NOW WHAT WERE THE THREE OBJECTS I ASKED YOU TO REMEMBER?: 1
SAY: I WOULD LIKE YOU TO COUNT BACKWARD FROM 100 BY SEVENS: 3
SAY: PUT THE PAPER DOWN ON THE FLOOR, SCORE IF PAPER IS PLACED BACK ON FLOOR: 1
SHOW: WRISTWATCH [OBJECT] ASK: WHAT IS THIS CALLED?: 1
WHAT FLOOR ARE WE ON [IN FACILITY]?/ WHAT ROOM ARE WE IN [IN HOME]?: 1
PLACE DESIGN, ERASER AND PENCIL IN FRONT OF THE PERSON.  SAY:  COPY THIS DESIGN PLEASE.  SHOW: DESIGN. ALLOW: MULTIPLE TRIES. WAIT UNTIL PERSON IS FINISHED AND HANDS IT BACK. SCORE: ONLY FOR DIAGRAM WITH 4-SIDED FIGURE BETWEEN TWO 5-SIDED FIGURES: 1
SAY: READ THE WORDS ON THE PAGE AND THEN DO WHAT IT SAYS. THEN HAND THE PERSON
THE SHEET WITH CLOSE YOUR EYES ON IT. IF THE SUBJECT READS AND DOES NOT CLOSE THEIR EYES, REPEAT UP TO THREE TIMES. SCORE ONLY IF SUBJECT CLOSES EYES.: 1
WHAT COUNTRY ARE WE IN?: 1
WHICH SEASON IS THIS?: 1
SUM ALL MMSE QUESTIONS FOR TOTAL SCORE [OUT OF 30].: 24
SAY: I AM GOING TO NAME THREE OBJECTS. WHEN I AM FINISHED, I WANT YOU TO REPEAT
THEM. REMEMBER WHAT THEY ARE BECAUSE I AM GOING TO ASK YOU TO NAME THEM AGAIN IN
A FEW MINUTES.  SAY THE FOLLOWING WORDS SLOWLY AT 1-SECOND INTERVALS - BALL/ CAR/ MAN [ITERATIONS FOR REPEAT ADMINISTRATION]: 3
SAY: I WOULD LIKE YOU TO REPEAT THIS PHRASE AFTER ME: NO IFS, ANDS, OR BUTS.: 1
HAND THE PERSON A PENCIL AND PAPER. SAY: WRITE ANY COMPLETE SENTENCE ON THAT
PIECE OF PAPER. (NOTE: THE SENTENCE MUST MAKE SENSE. IGNORE SPELLING ERRORS): 1
WHAT MONTH IS THIS?: 1
ASK THE PERSON IF HE IS RIGHT OR LEFT-HANDED. TAKE A PIECE OF PAPER AND HOLD IT UP IN
FRONT OF THE PERSON. SAY: TAKE THIS PAPER IN YOUR RIGHT/LEFT HAND (WHICHEVER IS NON-
DOMINANT), SCORE IF PAPER IS PICKED UP IN CORRECT HAND.: 1
WHAT DAY OF THE WEEK IS THIS?: 0
WHAT STATE [OR PROVINCE] ARE WE IN?: 1
WHAT CITY/TOWN ARE WE IN?: 1
SAY: FOLD THE PAPER IN HALF ONCE WITH BOTH HANDS, SCORE IF PAPER IS CORRECTLY FOLDED IN HALF.: 1
WHAT YEAR IS THIS?: 1
WHAT IS THE NAME OF THIS BUILDING [IN FACILITY]?/WHAT IS THE STREET ADDRESS OF THIS HOUSE [IN HOME]?: 1
SHOW: PENCIL [OBJECT] ASK: WHAT IS THIS CALLED?: 1
WHAT IS TODAY'S DATE?: 0

## 2022-06-10 ASSESSMENT — GERIATRIC DEPRESSION SCALE (GDS)
ARE YOU BASICALLY SATISFIED WITH YOUR LIFE: 1
DO YOU OFTEN GET RESTLESS AND FIDGETY: 0
DO YOU THINK IT IS WONDERFUL TO BE ALIVE NOW: 0
DO YOU OFTEN FEEL DOWNHEARTED AND BLUE: 1

## 2022-06-14 ENCOUNTER — HOSPITAL ENCOUNTER (OUTPATIENT)
Age: 82
Discharge: HOME | End: 2022-06-14
Payer: MEDICARE

## 2022-06-14 DIAGNOSIS — F41.9 ANXIETY: ICD-10-CM

## 2022-06-14 DIAGNOSIS — D64.9 ANEMIA, UNSPECIFIED TYPE: ICD-10-CM

## 2022-06-14 LAB
ALBUMIN SERPL-MCNC: 4.6 G/DL (ref 3.5–5.2)
ALP BLD-CCNC: 81 U/L (ref 35–104)
ALT SERPL-CCNC: 10 U/L (ref 0–32)
ANION GAP SERPL CALCULATED.3IONS-SCNC: 11 MMOL/L (ref 7–16)
AST SERPL-CCNC: 21 U/L (ref 0–31)
BASOPHILS ABSOLUTE: 0.04 E9/L (ref 0–0.2)
BASOPHILS RELATIVE PERCENT: 0.7 % (ref 0–2)
BILIRUB SERPL-MCNC: 0.3 MG/DL (ref 0–1.2)
BUN BLDV-MCNC: 25 MG/DL (ref 6–23)
CALCIUM SERPL-MCNC: 9 MG/DL (ref 8.6–10.2)
CHLORIDE BLD-SCNC: 105 MMOL/L (ref 98–107)
CO2: 25 MMOL/L (ref 22–29)
CREAT SERPL-MCNC: 0.7 MG/DL (ref 0.5–1)
EOSINOPHILS ABSOLUTE: 0.11 E9/L (ref 0.05–0.5)
EOSINOPHILS RELATIVE PERCENT: 1.9 % (ref 0–6)
FOLATE: >20 NG/ML (ref 4.8–24.2)
GFR AFRICAN AMERICAN: >60
GFR NON-AFRICAN AMERICAN: >60 ML/MIN/1.73
GLUCOSE BLD-MCNC: 117 MG/DL (ref 74–99)
HCT VFR BLD CALC: 37.8 % (ref 34–48)
HEMOGLOBIN: 12 G/DL (ref 11.5–15.5)
IMMATURE GRANULOCYTES #: 0.02 E9/L
IMMATURE GRANULOCYTES %: 0.3 % (ref 0–5)
LYMPHOCYTES ABSOLUTE: 1.73 E9/L (ref 1.5–4)
LYMPHOCYTES RELATIVE PERCENT: 29.2 % (ref 20–42)
MCH RBC QN AUTO: 29.1 PG (ref 26–35)
MCHC RBC AUTO-ENTMCNC: 31.7 % (ref 32–34.5)
MCV RBC AUTO: 91.7 FL (ref 80–99.9)
MONOCYTES ABSOLUTE: 0.55 E9/L (ref 0.1–0.95)
MONOCYTES RELATIVE PERCENT: 9.3 % (ref 2–12)
NEUTROPHILS ABSOLUTE: 3.47 E9/L (ref 1.8–7.3)
NEUTROPHILS RELATIVE PERCENT: 58.6 % (ref 43–80)
PDW BLD-RTO: 14.7 FL (ref 11.5–15)
PLATELET # BLD: 250 E9/L (ref 130–450)
PMV BLD AUTO: 8.8 FL (ref 7–12)
POTASSIUM SERPL-SCNC: 4.4 MMOL/L (ref 3.5–5)
RBC # BLD: 4.12 E12/L (ref 3.5–5.5)
SODIUM BLD-SCNC: 141 MMOL/L (ref 132–146)
TOTAL PROTEIN: 7.4 G/DL (ref 6.4–8.3)
TSH SERPL DL<=0.05 MIU/L-ACNC: 0.46 UIU/ML (ref 0.27–4.2)
VITAMIN B-12: 626 PG/ML (ref 211–946)
WBC # BLD: 5.9 E9/L (ref 4.5–11.5)

## 2022-06-14 PROCEDURE — 82607 VITAMIN B-12: CPT

## 2022-06-14 PROCEDURE — 80053 COMPREHEN METABOLIC PANEL: CPT

## 2022-06-14 PROCEDURE — 36415 COLL VENOUS BLD VENIPUNCTURE: CPT

## 2022-06-14 PROCEDURE — 85025 COMPLETE CBC W/AUTO DIFF WBC: CPT

## 2022-06-14 PROCEDURE — 82746 ASSAY OF FOLIC ACID SERUM: CPT

## 2022-06-14 PROCEDURE — 84443 ASSAY THYROID STIM HORMONE: CPT

## 2022-08-25 NOTE — PROGRESS NOTES
Loneliness, Social Isolation, Stress or Anger?: (!) Yes  Select all that apply: (!) Loneliness, Social Isolation, Stress, Anger  Do you get the social and emotional support that you need?: Yes  Do you have a Living Will?: Yes    Advance Directives       Power of  Living Will ACP-Advance Directive ACP-Power of     Not on File Filed on 10/12/12 Filed Not on File          General Health Risk Interventions:  Loneliness: patient declines any further intervention for this issue  Social isolation: patient declines any further intervention for this issue  Stress: medication prescribed- zoloft dosage increased. Pt ed on CBT options. Anger: medication adjusted- see above    Health Habits/Nutrition:  Physical Activity: Sufficiently Active    Days of Exercise per Week: 3 days    Minutes of Exercise per Session: 50 min     Have you lost any weight without trying in the past 3 months?: (!) Yes  Body mass index: 23.24  Have you seen the dentist within the past year?: Yes  Health Habits/Nutrition Interventions:  Pt has lost 5 lbs since last visit. Encouraged home health aids during the day. Objective   Vitals:    08/26/22 1106   BP: 96/64   Pulse: 64   Resp: 18   Temp: 97.7 °F (36.5 °C)   Weight: 123 lb (55.8 kg)   Height: 5' 1\" (1.549 m)      Body mass index is 23.24 kg/m².       General Appearance: alert and oriented to person, place and time, well-developed and well-nourished, in no acute distress  Skin: warm and dry, no rash or erythema  Head: normocephalic and atraumatic  Neck: neck supple and non tender without mass, no thyromegaly or thyroid nodules, no cervical lymphadenopathy   Pulmonary/Chest: clear to auscultation bilaterally- no wheezes, rales or rhonchi, normal air movement, no respiratory distress  Cardiovascular: normal rate, normal S1 and S2, no gallops, and no carotid bruits  Abdomen: soft, non-tender  Extremities: no edema  Neurologic: gait and coordination normal and speech normal Allergies   Allergen Reactions    Aspirin Swelling     Lips swelling    Penicillins      Unsure of reaction    Bactrim [Sulfamethoxazole-Trimethoprim]      Had terrible shaking     Prior to Visit Medications    Medication Sig Taking?  Authorizing Provider   sertraline (ZOLOFT) 50 MG tablet Take 0.5 tablets by mouth daily Yes Ginny Kilpatrick MD   atorvastatin (LIPITOR) 10 MG tablet TAKE 1 TABLET BY MOUTH EVERY NIGHT  Ginny Kilpatrick MD   meclizine (ANTIVERT) 25 MG tablet TAKE ONE TABLET BY MOUTH THREE TIMES A DAY AS NEEDED FOR DIZZINESS  Ginny Kilpatrick MD   Biotin 5 MG TABS as directed  Historical Provider, MD   Oxyquinoline-Sod Lauryl Sulf (TRIMO-MAR) 0.025-0.01 % GEL Place 1 applicator vaginally twice a week  Ginny Kilpatrick MD       [facility] (Including outside providers/suppliers regularly involved in providing care):   Patient Care Team:  Ginny Kilpatrick MD as PCP - General (Family Medicine)  Ginny Kilpatrick MD as PCP - [facility] Empaneled Provider     Reviewed and updated this visit:  Allergies  Meds

## 2022-08-26 ENCOUNTER — OFFICE VISIT (OUTPATIENT)
Dept: FAMILY MEDICINE CLINIC | Age: 82
End: 2022-08-26
Payer: MEDICARE

## 2022-08-26 VITALS
HEIGHT: 61 IN | TEMPERATURE: 97.7 F | HEART RATE: 64 BPM | WEIGHT: 123 LBS | SYSTOLIC BLOOD PRESSURE: 96 MMHG | BODY MASS INDEX: 23.22 KG/M2 | DIASTOLIC BLOOD PRESSURE: 64 MMHG | RESPIRATION RATE: 18 BRPM

## 2022-08-26 DIAGNOSIS — I10 ESSENTIAL HYPERTENSION: Chronic | ICD-10-CM

## 2022-08-26 DIAGNOSIS — R41.3 MEMORY CHANGE: ICD-10-CM

## 2022-08-26 DIAGNOSIS — E78.2 MIXED HYPERLIPIDEMIA: ICD-10-CM

## 2022-08-26 DIAGNOSIS — D64.9 ANEMIA, UNSPECIFIED TYPE: ICD-10-CM

## 2022-08-26 DIAGNOSIS — Z00.00 MEDICARE ANNUAL WELLNESS VISIT, SUBSEQUENT: Primary | ICD-10-CM

## 2022-08-26 PROCEDURE — G0439 PPPS, SUBSEQ VISIT: HCPCS | Performed by: FAMILY MEDICINE

## 2022-08-26 PROCEDURE — 1123F ACP DISCUSS/DSCN MKR DOCD: CPT | Performed by: FAMILY MEDICINE

## 2022-08-26 RX ORDER — LISINOPRIL 10 MG/1
10 TABLET ORAL DAILY
Qty: 90 TABLET | Refills: 1 | Status: SHIPPED
Start: 2022-08-26 | End: 2022-08-28 | Stop reason: ALTCHOICE

## 2022-08-26 ASSESSMENT — MINI MENTAL STATE EXAM
WHAT MONTH IS THIS?: 1
SAY: FOLD THE PAPER IN HALF ONCE WITH BOTH HANDS, SCORE IF PAPER IS CORRECTLY FOLDED IN HALF.: 1
WHAT STATE [OR PROVINCE] ARE WE IN?: 1
WHAT YEAR IS THIS?: 1
SAY: READ THE WORDS ON THE PAGE AND THEN DO WHAT IT SAYS. THEN HAND THE PERSON
THE SHEET WITH CLOSE YOUR EYES ON IT. IF THE SUBJECT READS AND DOES NOT CLOSE THEIR EYES, REPEAT UP TO THREE TIMES. SCORE ONLY IF SUBJECT CLOSES EYES.: 1
SHOW: WRISTWATCH [OBJECT] ASK: WHAT IS THIS CALLED?: 1
WHICH SEASON IS THIS?: 1
WHAT IS THE NAME OF THIS BUILDING [IN FACILITY]?/WHAT IS THE STREET ADDRESS OF THIS HOUSE [IN HOME]?: 1
NOW WHAT WERE THE THREE OBJECTS I ASKED YOU TO REMEMBER?: 0
SHOW: PENCIL [OBJECT] ASK: WHAT IS THIS CALLED?: 1
WHAT COUNTRY ARE WE IN?: 1
ASK THE PERSON IF HE IS RIGHT OR LEFT-HANDED. TAKE A PIECE OF PAPER AND HOLD IT UP IN
FRONT OF THE PERSON. SAY: TAKE THIS PAPER IN YOUR RIGHT/LEFT HAND (WHICHEVER IS NON-
DOMINANT), SCORE IF PAPER IS PICKED UP IN CORRECT HAND.: 1
SAY: I WOULD LIKE YOU TO REPEAT THIS PHRASE AFTER ME: NO IFS, ANDS, OR BUTS.: 1
SUM ALL MMSE QUESTIONS FOR TOTAL SCORE [OUT OF 30].: 24
WHAT IS TODAY'S DATE?: 0
SAY: I AM GOING TO NAME THREE OBJECTS. WHEN I AM FINISHED, I WANT YOU TO REPEAT
THEM. REMEMBER WHAT THEY ARE BECAUSE I AM GOING TO ASK YOU TO NAME THEM AGAIN IN
A FEW MINUTES.  SAY THE FOLLOWING WORDS SLOWLY AT 1-SECOND INTERVALS - BALL/ CAR/ MAN [ITERATIONS FOR REPEAT ADMINISTRATION]: 3
SAY: PUT THE PAPER DOWN ON THE FLOOR, SCORE IF PAPER IS PLACED BACK ON FLOOR: 1
WHAT DAY OF THE WEEK IS THIS?: 1
WHAT FLOOR ARE WE ON [IN FACILITY]?/ WHAT ROOM ARE WE IN [IN HOME]?: 1
PLACE DESIGN, ERASER AND PENCIL IN FRONT OF THE PERSON.  SAY:  COPY THIS DESIGN PLEASE.  SHOW: DESIGN. ALLOW: MULTIPLE TRIES. WAIT UNTIL PERSON IS FINISHED AND HANDS IT BACK. SCORE: ONLY FOR DIAGRAM WITH 4-SIDED FIGURE BETWEEN TWO 5-SIDED FIGURES: 1
WHAT CITY/TOWN ARE WE IN?: 1
HAND THE PERSON A PENCIL AND PAPER. SAY: WRITE ANY COMPLETE SENTENCE ON THAT
PIECE OF PAPER. (NOTE: THE SENTENCE MUST MAKE SENSE. IGNORE SPELLING ERRORS): 1
SAY: I WOULD LIKE YOU TO COUNT BACKWARD FROM 100 BY SEVENS: 3

## 2022-08-26 ASSESSMENT — ANXIETY QUESTIONNAIRES
3. WORRYING TOO MUCH ABOUT DIFFERENT THINGS: 3
5. BEING SO RESTLESS THAT IT IS HARD TO SIT STILL: 0
7. FEELING AFRAID AS IF SOMETHING AWFUL MIGHT HAPPEN: 3
1. FEELING NERVOUS, ANXIOUS, OR ON EDGE: 3
2. NOT BEING ABLE TO STOP OR CONTROL WORRYING: 2
4. TROUBLE RELAXING: 0
IF YOU CHECKED OFF ANY PROBLEMS ON THIS QUESTIONNAIRE, HOW DIFFICULT HAVE THESE PROBLEMS MADE IT FOR YOU TO DO YOUR WORK, TAKE CARE OF THINGS AT HOME, OR GET ALONG WITH OTHER PEOPLE: SOMEWHAT DIFFICULT
6. BECOMING EASILY ANNOYED OR IRRITABLE: 2
GAD7 TOTAL SCORE: 13

## 2022-08-26 ASSESSMENT — PATIENT HEALTH QUESTIONNAIRE - PHQ9
2. FEELING DOWN, DEPRESSED OR HOPELESS: 2
SUM OF ALL RESPONSES TO PHQ QUESTIONS 1-9: 4
SUM OF ALL RESPONSES TO PHQ QUESTIONS 1-9: 4
10. IF YOU CHECKED OFF ANY PROBLEMS, HOW DIFFICULT HAVE THESE PROBLEMS MADE IT FOR YOU TO DO YOUR WORK, TAKE CARE OF THINGS AT HOME, OR GET ALONG WITH OTHER PEOPLE: 0
SUM OF ALL RESPONSES TO PHQ9 QUESTIONS 1 & 2: 3
SUM OF ALL RESPONSES TO PHQ QUESTIONS 1-9: 4
9. THOUGHTS THAT YOU WOULD BE BETTER OFF DEAD, OR OF HURTING YOURSELF: 0
SUM OF ALL RESPONSES TO PHQ QUESTIONS 1-9: 4
5. POOR APPETITE OR OVEREATING: 1
3. TROUBLE FALLING OR STAYING ASLEEP: 0
6. FEELING BAD ABOUT YOURSELF - OR THAT YOU ARE A FAILURE OR HAVE LET YOURSELF OR YOUR FAMILY DOWN: 0
1. LITTLE INTEREST OR PLEASURE IN DOING THINGS: 1
7. TROUBLE CONCENTRATING ON THINGS, SUCH AS READING THE NEWSPAPER OR WATCHING TELEVISION: 0
4. FEELING TIRED OR HAVING LITTLE ENERGY: 0
8. MOVING OR SPEAKING SO SLOWLY THAT OTHER PEOPLE COULD HAVE NOTICED. OR THE OPPOSITE, BEING SO FIGETY OR RESTLESS THAT YOU HAVE BEEN MOVING AROUND A LOT MORE THAN USUAL: 0

## 2022-08-26 ASSESSMENT — LIFESTYLE VARIABLES
HOW OFTEN DO YOU HAVE A DRINK CONTAINING ALCOHOL: NEVER
HOW MANY STANDARD DRINKS CONTAINING ALCOHOL DO YOU HAVE ON A TYPICAL DAY: PATIENT DOES NOT DRINK

## 2022-08-26 NOTE — PATIENT INSTRUCTIONS
We are decreasing the dose of your lisinopril. We are increasing the dose of your zoloft/sertraline      Personalized Preventive Plan for Karyna Veliz - 8/26/2022  Medicare offers a range of preventive health benefits. Some of the tests and screenings are paid in full while other may be subject to a deductible, co-insurance, and/or copay. Some of these benefits include a comprehensive review of your medical history including lifestyle, illnesses that may run in your family, and various assessments and screenings as appropriate. After reviewing your medical record and screening and assessments performed today your provider may have ordered immunizations, labs, imaging, and/or referrals for you. A list of these orders (if applicable) as well as your Preventive Care list are included within your After Visit Summary for your review. Other Preventive Recommendations:    A preventive eye exam performed by an eye specialist is recommended every 1-2 years to screen for glaucoma; cataracts, macular degeneration, and other eye disorders. A preventive dental visit is recommended every 6 months. Try to get at least 150 minutes of exercise per week or 10,000 steps per day on a pedometer . Order or download the FREE \"Exercise & Physical Activity: Your Everyday Guide\" from [employer]. Call 1-353.502.7920 or search [employer] online. You need 1774-7224 mg of calcium and 8824-2446 IU of vitamin D per day. It is possible to meet your calcium requirement with diet alone, but a vitamin D supplement is usually necessary to meet this goal.  When exposed to the sun, use a sunscreen that protects against both UVA and UVB radiation with an SPF of 30 or greater. Reapply every 2 to 3 hours or after sweating, drying off with a towel, or swimming. Always wear a seat belt when traveling in a car. Always wear a helmet when riding a bicycle or motorcycle.

## 2022-08-27 DIAGNOSIS — E78.2 MIXED HYPERLIPIDEMIA: ICD-10-CM

## 2022-08-28 ENCOUNTER — TELEPHONE (OUTPATIENT)
Dept: FAMILY MEDICINE CLINIC | Age: 82
End: 2022-08-28

## 2022-08-29 PROBLEM — Z96.659 S/P TOTAL KNEE ARTHROPLASTY: Status: ACTIVE | Noted: 2022-08-29

## 2022-08-29 RX ORDER — ATORVASTATIN CALCIUM 10 MG/1
TABLET, FILM COATED ORAL
Qty: 90 TABLET | Refills: 1 | Status: SHIPPED | OUTPATIENT
Start: 2022-08-29

## 2022-08-29 NOTE — TELEPHONE ENCOUNTER
Please call patient and patient's daughter (Pt has memory issues). Her blood pressure was low in the office and I would like to stop her lisinopril. We will test it the next time she comes in and can always restart it if needed. For a patient of her age, it is ok for her blood pressure to get up to 150/90 without issue.

## 2022-09-19 ENCOUNTER — TELEPHONE (OUTPATIENT)
Dept: FAMILY MEDICINE CLINIC | Age: 82
End: 2022-09-19

## 2022-09-19 NOTE — TELEPHONE ENCOUNTER
Received call from the pharmacy in regards to the zoloft. They have 2 different scripts and different directions. On 8/26/2022 the office note states   We are increasing the dose of your zoloft/sertraline    50 mg was sent in but the directions state to take a half tablet daily. (25 mg daily) Patient was previously taking 2 of the 25 mg tablets daily.  (50 mg daily)     Please advise

## 2022-09-20 ENCOUNTER — TELEPHONE (OUTPATIENT)
Dept: FAMILY MEDICINE CLINIC | Age: 82
End: 2022-09-20

## 2022-09-20 NOTE — TELEPHONE ENCOUNTER
Patient is calling regarding her sertraline 50 mg tabs. She has been taking two tablets per day instead of two 1/2 tablets. She is now out of medication and the pharmacy won't fill it until the doctor sends a new script. Patient would like to talk to doctor about  taking too much medication.

## 2022-09-20 NOTE — TELEPHONE ENCOUNTER
Sertraline 50mg patient was taking 1 pill did she is confused on how she was to take it, daughter thought you said two times a day?   Pharmacy needs us to send in a new script with correct instructions

## 2022-09-20 NOTE — TELEPHONE ENCOUNTER
I sent in new script. She is supposed to be taking 50mg of zoloft once daily. Please also let the daughter Harris Jaffe know this. Thanks.

## 2022-09-22 ENCOUNTER — TELEPHONE (OUTPATIENT)
Dept: FAMILY MEDICINE CLINIC | Age: 82
End: 2022-09-22

## 2022-09-22 NOTE — TELEPHONE ENCOUNTER
----- Message from Ginny Morrison sent at 9/20/2022  4:37 PM EDT -----  Subject: Medication Problem    Medication: sertraline (ZOLOFT) 50 MG tablet  Dosage: 1/2 tablet   Ordering Provider: Miguel Adair     Question/Problem: After her knee replacement she ended up taking 2 pills a   day for several days. Please call & verify how she is to take this   medication.    Additional Information for Provider:     Pharmacy: [address] [place] [address], [address] [place] [address] [phone]    ---------------------------------------------------------------------------  --------------  [address] [place] [address]; OK to leave message on voicemail  ---------------------------------------------------------------------------  --------------    SCRIPT ANSWERS  Relationship to Patient: Self

## 2022-09-23 NOTE — TELEPHONE ENCOUNTER
I think this has been resolved. Please check with the pharmacy and with the patient's daughter. Thanks. She is taking 50mg daily.

## 2022-10-07 ENCOUNTER — OFFICE VISIT (OUTPATIENT)
Dept: FAMILY MEDICINE CLINIC | Age: 82
End: 2022-10-07
Payer: MEDICARE

## 2022-10-07 VITALS
BODY MASS INDEX: 22.84 KG/M2 | OXYGEN SATURATION: 98 % | DIASTOLIC BLOOD PRESSURE: 73 MMHG | RESPIRATION RATE: 18 BRPM | HEART RATE: 97 BPM | HEIGHT: 61 IN | WEIGHT: 121 LBS | SYSTOLIC BLOOD PRESSURE: 156 MMHG

## 2022-10-07 DIAGNOSIS — H61.23 BILATERAL IMPACTED CERUMEN: Primary | ICD-10-CM

## 2022-10-07 PROCEDURE — 90694 VACC AIIV4 NO PRSRV 0.5ML IM: CPT | Performed by: FAMILY MEDICINE

## 2022-10-07 PROCEDURE — G0008 ADMIN INFLUENZA VIRUS VAC: HCPCS | Performed by: FAMILY MEDICINE

## 2022-10-07 PROCEDURE — 69210 REMOVE IMPACTED EAR WAX UNI: CPT | Performed by: FAMILY MEDICINE

## 2022-10-07 SDOH — ECONOMIC STABILITY: FOOD INSECURITY: WITHIN THE PAST 12 MONTHS, YOU WORRIED THAT YOUR FOOD WOULD RUN OUT BEFORE YOU GOT MONEY TO BUY MORE.: NEVER TRUE

## 2022-10-07 SDOH — ECONOMIC STABILITY: FOOD INSECURITY: WITHIN THE PAST 12 MONTHS, THE FOOD YOU BOUGHT JUST DIDN'T LAST AND YOU DIDN'T HAVE MONEY TO GET MORE.: NEVER TRUE

## 2022-10-07 ASSESSMENT — SOCIAL DETERMINANTS OF HEALTH (SDOH): HOW HARD IS IT FOR YOU TO PAY FOR THE VERY BASICS LIKE FOOD, HOUSING, MEDICAL CARE, AND HEATING?: NOT HARD AT ALL

## 2022-10-07 NOTE — PROGRESS NOTES
10/28/2022     Guardian is a 78 y.o. femalehere for:    HPI:    Here for ear concerns  She has been having hearing in right ear  Denies any pus, drainage or pain  Wears hearing aids as well   Started yesterday, some muffling sounds  Daughter in room , stated she struggles with ear issues. Hx of knee replaceemt       BP Readings from Last 3 Encounters:   10/07/22 (!) 156/73   08/26/22 96/64   06/10/22 127/61     Current Outpatient Medications   Medication Sig Dispense Refill    carbamide peroxide (DEBROX) 6.5 % otic solution Place 5 drops in ear(s) 2 times daily 15 mL 1    atorvastatin (LIPITOR) 10 MG tablet TAKE ONE TABLET BY MOUTH EVERY NIGHT 90 tablet 1    meclizine (ANTIVERT) 25 MG tablet TAKE ONE TABLET BY MOUTH THREE TIMES A DAY AS NEEDED FOR DIZZINESS 30 tablet 2    Biotin 5 MG TABS as directed      Oxyquinoline-Sod Lauryl Sulf (TRIMO-MAR) 0.025-0.01 % GEL Place 1 applicator vaginally twice a week 1 each 0    sertraline (ZOLOFT) 100 MG tablet Take 1 tablet by mouth nightly 30 tablet 5     No current facility-administered medications for this visit.       Allergies   Allergen Reactions    Aspirin Swelling and Other (See Comments)     Lips swelling    Penicillins      Unsure of reaction    Bactrim [Sulfamethoxazole-Trimethoprim]      Had terrible shaking       Past Medical & Surgical History:      Diagnosis Date    Allergic rhinitis     Alopecia of scalp 9/3/2015    Anxiety 9/3/2014    Basal cell carcinoma of nose     left side    Breast CA ([facility] [address] 2001    right lumpectomy; DO NOT USE FOR BP,IV,VENIPUNVTURES PER PT    Constipated     postop hip replacement, impacted stool    Episodic low back pain     MRI 3/2010 normal    Essential hypertension 9/3/2015    Hyperlipidemia     Hypertension     pt states well controlled    Neck pain     Osteoarthritis 2/19/2014    Osteoarthritis 10/08/2014    Right knee on x-ray - [facility]    Osteoarthritis of both hips     Osteopenia     Urinary, incontinence, stress female 8/27/2015    Follows with Dr Sophia Carpenter. Advised surgery or pessary. Past Surgical History:   Procedure Laterality Date    APPENDECTOMY      child    BREAST LUMPECTOMY  04/2000    COLONOSCOPY  2000    HYSTERECTOMY (CERVIX STATUS UNKNOWN)  1979    BRANDON    JOINT REPLACEMENT Right 10/01/2012     Right Hip - [facility]    OTHER SURGICAL HISTORY Left 10/18/2013    full thickness skin graft, repair open nose defect - Dr. Jose L Borja / Dr. Israel Sheikh       Family History:  No family history on file. Social History:  Social History     Tobacco Use    Smoking status: Never    Smokeless tobacco: Never   Substance Use Topics    Alcohol use: Yes     Comment: OCCAS  WINE       Immunization History   Administered Date(s) Administered    COVID-19, PFIZER PURPLE top, DILUTE for use, (age 15 y+), 30mcg/0.3mL 01/22/2021, 02/12/2021, 11/05/2021    Influenza 10/15/2013    Influenza Virus Vaccine 11/30/2012, 10/02/2014    Influenza, FLUAD, (age 72 y+), Adjuvanted, 0.5mL 09/24/2020, 09/24/2021, 10/07/2022    Influenza, High Dose (Fluzone 65 yrs and older) 10/01/2015, 10/21/2016, 09/05/2017, 09/18/2018, 09/24/2020    Influenza, Triv, inactivated, subunit, adjuvanted, IM (Fluad 65 yrs and older) 09/25/2019    Pneumococcal Conjugate 13-valent ([id number]) 09/03/2015    Pneumococcal Polysaccharide (Rffixughs93) 11/01/2008    Zoster Live (Zostavax) 11/01/2014       Review of Systems   Constitutional:  Negative for chills and fever. HENT:  Positive for hearing loss. Negative for congestion, ear discharge, ear pain, sore throat and trouble swallowing. Respiratory:  Negative for cough. Cardiovascular:  Negative for chest pain and leg swelling. Gastrointestinal:  Negative for abdominal pain, constipation, diarrhea, nausea and vomiting. Genitourinary:  Negative for difficulty urinating. Musculoskeletal:  Negative for arthralgias and myalgias. Skin:  Negative for rash and wound.    Neurological:  Negative for dizziness and headaches. Psychiatric/Behavioral:  Negative for agitation. VS:  BP (!) 156/73   Pulse 97   Resp 18   Ht 5' 1\" (1.549 m)   Wt 121 lb (54.9 kg)   SpO2 98%   BMI 22.86 kg/m²     Physical Exam  Constitutional:       Appearance: Normal appearance. HENT:      Head: Normocephalic. Right Ear: There is impacted cerumen. Left Ear: There is impacted cerumen. Nose: Nose normal. No rhinorrhea. Eyes:      General: No scleral icterus. Conjunctiva/sclera: Conjunctivae normal.   Cardiovascular:      Rate and Rhythm: Normal rate and regular rhythm. Pulses: Normal pulses. Heart sounds: Normal heart sounds. No murmur heard. Pulmonary:      Breath sounds: Normal breath sounds. No wheezing, rhonchi or rales. Abdominal:      General: Abdomen is flat. Bowel sounds are normal.   Musculoskeletal:      Right lower leg: No edema. Left lower leg: No edema. Skin:     General: Skin is warm. Findings: No rash. Neurological:      General: No focal deficit present. Mental Status: She is alert. Assessment/Plan:  1. Bilateral impacted cerumen  Ceruminosis is noted. Wax is removed by syringing and manual debridement. Instructions for home care to prevent wax buildup are given. Will prescribe debrox drops in ear. Tympanometry performed in clinic was inconclusive   Pt was instructed to getting hearing aids evaluated and adressed. - [id number] - OR REMOVE IMPACTED EAR WAX    Follow up:  prn     Patient agrees with the above stated plan. Sachin Montenegro received counseling on the following healthy behaviors: nutrition, exercise, and medication adherence.     Tacho Catalan MD  PGY-3 Family Medicine

## 2022-10-10 ENCOUNTER — TELEPHONE (OUTPATIENT)
Dept: FAMILY MEDICINE CLINIC | Age: 82
End: 2022-10-10

## 2022-10-10 DIAGNOSIS — F02.B3 MODERATE LATE ONSET ALZHEIMER'S DEMENTIA WITH MOOD DISTURBANCE (HCC): ICD-10-CM

## 2022-10-10 DIAGNOSIS — G30.1 MODERATE LATE ONSET ALZHEIMER'S DEMENTIA WITH MOOD DISTURBANCE (HCC): ICD-10-CM

## 2022-10-10 RX ORDER — SERTRALINE HYDROCHLORIDE 100 MG/1
100 TABLET, FILM COATED ORAL NIGHTLY
Qty: 30 TABLET | Refills: 5 | Status: SHIPPED | OUTPATIENT
Start: 2022-10-10

## 2022-10-10 NOTE — TELEPHONE ENCOUNTER
Patient's daughter Hugo Carlson is requesting to speak to Dr. Edson Natarajan about patient's medications please.

## 2022-10-15 ENCOUNTER — TELEPHONE (OUTPATIENT)
Dept: FAMILY MEDICINE CLINIC | Age: 82
End: 2022-10-15

## 2022-10-15 NOTE — TELEPHONE ENCOUNTER
Please call the daughter and ask her to check the patient's blood pressure at home. I just want to make sure it has come down from when she was in the clinic last.  Thanks.

## 2022-10-19 NOTE — TELEPHONE ENCOUNTER
Spoke with Tanesha,she states she hasn't taken it,I asked her to please do so and call the office back. She said she will when she can.

## 2022-10-28 ASSESSMENT — ENCOUNTER SYMPTOMS
DIARRHEA: 0
VOMITING: 0
SORE THROAT: 0
ABDOMINAL PAIN: 0
NAUSEA: 0
COUGH: 0
CONSTIPATION: 0
TROUBLE SWALLOWING: 0

## 2022-11-02 ENCOUNTER — TELEPHONE (OUTPATIENT)
Dept: FAMILY MEDICINE CLINIC | Age: 82
End: 2022-11-02

## 2022-11-02 NOTE — TELEPHONE ENCOUNTER
Her BP was very elevated at her last check. They check at home. Can the daughter check and let us know what the results are? She can even send a photo or a note of the BP results through [employer] if that is easier.   danilo

## 2022-11-02 NOTE — PROGRESS NOTES
[address]  Precepting Note    Subjective:  Here with hearing trouble - blocked ear wax. Objective:    BP (!) 156/73   Pulse 97   Resp 18   Ht 5' 1\" (1.549 m)   Wt 121 lb (54.9 kg)   SpO2 98%   BMI 22.86 kg/m²     Exam is as noted by resident with the following changes, additions or corrections:    General:  NAD; alert & oriented x 3   Tm with impacted cerumen  Cleared after irrigation and soft ear currette    Assessment/Plan:    Impacted cerumen  Flu shot today  F/u for repeat BP - last bp was low; Attending Physician Statement  I have reviewed the chart, including any radiology or labs, and have seen the patient with the resident(s). I personally reviewed and performed key elements of the history and exam.  I agree with the assessment, plan and orders as documented by the resident. Please refer to the resident note for additional information.       Electronically signed by Melanie Hayes MD on 11/2/2022 at 10:36 AM

## 2022-11-02 NOTE — TELEPHONE ENCOUNTER
Original message was sent from Dr Charlie Frias to the  - Bindu left message for them to call and schedule nurse visit     Patient's daughter returned call; She is questioning why patient needs to come in for BP check?      Please advise

## 2022-11-03 NOTE — TELEPHONE ENCOUNTER
Daughter called back on 10/20 with home BP readings (see below)      [address] [facility]  3:45 PM  Note   Pt daughter is calling back with bp 132/78 and 122/72. These were taken back to back.

## 2023-01-11 ENCOUNTER — NURSE ONLY (OUTPATIENT)
Dept: FAMILY MEDICINE CLINIC | Age: 83
End: 2023-01-11
Payer: MEDICARE

## 2023-01-11 DIAGNOSIS — I10 PRIMARY HYPERTENSION: Primary | ICD-10-CM

## 2023-01-11 DIAGNOSIS — D64.9 ANEMIA, UNSPECIFIED TYPE: ICD-10-CM

## 2023-01-11 DIAGNOSIS — E78.2 MIXED HYPERLIPIDEMIA: ICD-10-CM

## 2023-01-11 LAB
ALBUMIN SERPL-MCNC: 4.4 G/DL (ref 3.5–5.2)
ALP BLD-CCNC: 87 U/L (ref 35–104)
ALT SERPL-CCNC: 14 U/L (ref 0–32)
ANION GAP SERPL CALCULATED.3IONS-SCNC: 10 MMOL/L (ref 7–16)
AST SERPL-CCNC: 25 U/L (ref 0–31)
BASOPHILS ABSOLUTE: 0.04 E9/L (ref 0–0.2)
BASOPHILS RELATIVE PERCENT: 0.8 % (ref 0–2)
BILIRUB SERPL-MCNC: 0.5 MG/DL (ref 0–1.2)
BUN BLDV-MCNC: 24 MG/DL (ref 6–23)
CALCIUM SERPL-MCNC: 8.9 MG/DL (ref 8.6–10.2)
CHLORIDE BLD-SCNC: 104 MMOL/L (ref 98–107)
CHOLESTEROL, TOTAL: 226 MG/DL (ref 0–199)
CO2: 29 MMOL/L (ref 22–29)
CREAT SERPL-MCNC: 0.7 MG/DL (ref 0.5–1)
EOSINOPHILS ABSOLUTE: 0.14 E9/L (ref 0.05–0.5)
EOSINOPHILS RELATIVE PERCENT: 2.8 % (ref 0–6)
GFR SERPL CREATININE-BSD FRML MDRD: >60 ML/MIN/1.73
GLUCOSE BLD-MCNC: 86 MG/DL (ref 74–99)
HCT VFR BLD CALC: 40.2 % (ref 34–48)
HDLC SERPL-MCNC: 95 MG/DL
HEMOGLOBIN: 12.9 G/DL (ref 11.5–15.5)
IMMATURE GRANULOCYTES #: 0.02 E9/L
IMMATURE GRANULOCYTES %: 0.4 % (ref 0–5)
LDL CHOLESTEROL CALCULATED: 114 MG/DL (ref 0–99)
LYMPHOCYTES ABSOLUTE: 1.27 E9/L (ref 1.5–4)
LYMPHOCYTES RELATIVE PERCENT: 25.1 % (ref 20–42)
MCH RBC QN AUTO: 28.5 PG (ref 26–35)
MCHC RBC AUTO-ENTMCNC: 32.1 % (ref 32–34.5)
MCV RBC AUTO: 88.9 FL (ref 80–99.9)
MONOCYTES ABSOLUTE: 0.55 E9/L (ref 0.1–0.95)
MONOCYTES RELATIVE PERCENT: 10.9 % (ref 2–12)
NEUTROPHILS ABSOLUTE: 3.03 E9/L (ref 1.8–7.3)
NEUTROPHILS RELATIVE PERCENT: 60 % (ref 43–80)
PDW BLD-RTO: 14.2 FL (ref 11.5–15)
PLATELET # BLD: 198 E9/L (ref 130–450)
PMV BLD AUTO: 9.2 FL (ref 7–12)
POTASSIUM SERPL-SCNC: 4.3 MMOL/L (ref 3.5–5)
RBC # BLD: 4.52 E12/L (ref 3.5–5.5)
SODIUM BLD-SCNC: 143 MMOL/L (ref 132–146)
TOTAL PROTEIN: 6.9 G/DL (ref 6.4–8.3)
TRIGL SERPL-MCNC: 83 MG/DL (ref 0–149)
VLDLC SERPL CALC-MCNC: 17 MG/DL
WBC # BLD: 5.1 E9/L (ref 4.5–11.5)

## 2023-01-11 PROCEDURE — 36415 COLL VENOUS BLD VENIPUNCTURE: CPT | Performed by: FAMILY MEDICINE

## 2023-01-16 NOTE — PROGRESS NOTES
Ainsley Sandoval (:  1940) is a 78 y.o. female,Established patient, here for evaluation of the following chief complaint(s): Anxiety and Discuss Labs         ASSESSMENT/PLAN:  1. 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 Daughter is POA and needs advice on how to get help during the day and what services are available. Zoloft seems to be helping. 2. Mixed hyperlipidemia  -     atorvastatin (LIPITOR) 10 MG tablet; Take 0.5 tablets by mouth daily, Disp-45 tablet, R-1Normal  Will decrease to 5mg. WE have removed before and her LDL went up a lot. 3. White coat syndrome with high blood pressure but without hypertension  They will take her BP at home and call with results. She has been on meds in the past and she has had low BP. BP is high in the office. Return in about 7 months (around 2023) for AWV - after 23. Subjective   SUBJECTIVE/OBJECTIVE:  HPI    Pt on zoloft - do we need to change to low dose Abilify? No, doing well on the zoloft. HTN  - on no meds. Low at home. Had to go off meds at one point.  is getting worse. Has someone every night. Daughter was there the last 3 nights. He is to the point where he can't use the phone. Feels the zoloft is helping her. She has been waking up kind of weak in the AM.  She just does not feel good. After she eats she feels better. She will not eat after dinner at night. Check ears  Right carotid flow murmur    Frustrated with . Getting hard at home.  will not leave house. Can't do anything himself. Makes her feel down. Consult  to talk with daughter. Decrease lipitor to 5. She feels very tired. Off balance.   Not using the meclizine. Not really dizzy. Review of Systems   Constitutional:  Positive for fatigue. Negative for activity change, appetite change, chills, diaphoresis, fever and unexpected weight change. HENT:  Positive for hearing loss. Negative for congestion. Respiratory:  Negative for choking, chest tightness, shortness of breath and wheezing. Cardiovascular:  Negative for chest pain, palpitations and leg swelling. Gastrointestinal:  Negative for constipation and diarrhea. Genitourinary:  Positive for urgency. Negative for dysuria. Musculoskeletal:  Negative for gait problem. Balance is not great   Skin:  Positive for wound. Wound covered on leg from dermatologist removal of skin lesion   Neurological:  Negative for speech difficulty and light-headedness. Psychiatric/Behavioral:  Positive for dysphoric mood. Negative for sleep disturbance. The patient is nervous/anxious. Objective   Physical Exam  Vitals and nursing note reviewed. Constitutional:       General: She is not in acute distress. Appearance: Normal appearance. She is normal weight. HENT:      Right Ear: Tympanic membrane, ear canal and external ear normal.      Left Ear: Tympanic membrane, ear canal and external ear normal.      Ears:      Comments: Wax removed from ears bilaterally using clean technique. Patient tolerated procedure well. Nose: Nose normal.      Mouth/Throat:      Mouth: Mucous membranes are moist.   Neck:      Thyroid: No thyroid mass, thyromegaly or thyroid tenderness. Vascular: Carotid bruit present. Comments: Right carotid flow murmur  Cardiovascular:      Rate and Rhythm: Normal rate and regular rhythm. Heart sounds: Normal heart sounds. No murmur heard. No gallop. Pulmonary:      Effort: Pulmonary effort is normal. No respiratory distress. Breath sounds: Normal breath sounds. No wheezing. Abdominal:      General: Abdomen is flat. There is no distension. Palpations: Abdomen is soft. Musculoskeletal:         General: No swelling or tenderness. Cervical back: Neck supple. Lymphadenopathy:      Cervical: No cervical adenopathy. Skin:     General: Skin is warm and dry. Capillary Refill: Capillary refill takes less than 2 seconds. Neurological:      General: No focal deficit present. Mental Status: She is alert. Psychiatric:         Mood and Affect: Mood normal.         Behavior: Behavior normal.                An electronic signature was used to authenticate this note.     --Lindsey Nolan MD

## 2023-01-17 ENCOUNTER — OFFICE VISIT (OUTPATIENT)
Dept: FAMILY MEDICINE CLINIC | Age: 83
End: 2023-01-17

## 2023-01-17 VITALS
BODY MASS INDEX: 23.03 KG/M2 | DIASTOLIC BLOOD PRESSURE: 67 MMHG | SYSTOLIC BLOOD PRESSURE: 149 MMHG | HEIGHT: 61 IN | HEART RATE: 64 BPM | WEIGHT: 122 LBS | TEMPERATURE: 98.5 F

## 2023-01-17 DIAGNOSIS — G30.1 MODERATE LATE ONSET ALZHEIMER'S DEMENTIA WITH MOOD DISTURBANCE (HCC): Primary | ICD-10-CM

## 2023-01-17 DIAGNOSIS — R03.0 WHITE COAT SYNDROME WITH HIGH BLOOD PRESSURE BUT WITHOUT HYPERTENSION: ICD-10-CM

## 2023-01-17 DIAGNOSIS — F02.B3 MODERATE LATE ONSET ALZHEIMER'S DEMENTIA WITH MOOD DISTURBANCE (HCC): Primary | ICD-10-CM

## 2023-01-17 DIAGNOSIS — E78.2 MIXED HYPERLIPIDEMIA: ICD-10-CM

## 2023-01-17 RX ORDER — SERTRALINE HYDROCHLORIDE 100 MG/1
100 TABLET, FILM COATED ORAL NIGHTLY
Qty: 90 TABLET | Refills: 1 | Status: SHIPPED | OUTPATIENT
Start: 2023-01-17

## 2023-01-17 RX ORDER — ATORVASTATIN CALCIUM 10 MG/1
5 TABLET, FILM COATED ORAL DAILY
Qty: 45 TABLET | Refills: 1 | Status: SHIPPED | OUTPATIENT
Start: 2023-01-17

## 2023-01-17 ASSESSMENT — PATIENT HEALTH QUESTIONNAIRE - PHQ9
7. TROUBLE CONCENTRATING ON THINGS, SUCH AS READING THE NEWSPAPER OR WATCHING TELEVISION: 0
SUM OF ALL RESPONSES TO PHQ9 QUESTIONS 1 & 2: 2
2. FEELING DOWN, DEPRESSED OR HOPELESS: 1
8. MOVING OR SPEAKING SO SLOWLY THAT OTHER PEOPLE COULD HAVE NOTICED. OR THE OPPOSITE, BEING SO FIGETY OR RESTLESS THAT YOU HAVE BEEN MOVING AROUND A LOT MORE THAN USUAL: 0
3. TROUBLE FALLING OR STAYING ASLEEP: 0
5. POOR APPETITE OR OVEREATING: 0
SUM OF ALL RESPONSES TO PHQ QUESTIONS 1-9: 2
6. FEELING BAD ABOUT YOURSELF - OR THAT YOU ARE A FAILURE OR HAVE LET YOURSELF OR YOUR FAMILY DOWN: 0
SUM OF ALL RESPONSES TO PHQ QUESTIONS 1-9: 2
10. IF YOU CHECKED OFF ANY PROBLEMS, HOW DIFFICULT HAVE THESE PROBLEMS MADE IT FOR YOU TO DO YOUR WORK, TAKE CARE OF THINGS AT HOME, OR GET ALONG WITH OTHER PEOPLE: 0
1. LITTLE INTEREST OR PLEASURE IN DOING THINGS: 1
9. THOUGHTS THAT YOU WOULD BE BETTER OFF DEAD, OR OF HURTING YOURSELF: 0
SUM OF ALL RESPONSES TO PHQ QUESTIONS 1-9: 2
4. FEELING TIRED OR HAVING LITTLE ENERGY: 0
SUM OF ALL RESPONSES TO PHQ QUESTIONS 1-9: 2

## 2023-01-17 NOTE — PATIENT INSTRUCTIONS
Try debrox for ears    Please call with blood pressure in a few days. Ok to come and check your cuff against ours.

## 2023-01-18 ENCOUNTER — TELEPHONE (OUTPATIENT)
Dept: FAMILY MEDICINE CLINIC | Age: 83
End: 2023-01-18

## 2023-01-18 RX ORDER — TRIAMCINOLONE ACETONIDE 1 MG/G
CREAM TOPICAL
COMMUNITY
Start: 2023-01-13

## 2023-01-18 ASSESSMENT — ENCOUNTER SYMPTOMS
SHORTNESS OF BREATH: 0
DIARRHEA: 0
WHEEZING: 0
CHEST TIGHTNESS: 0
CONSTIPATION: 0
CHOKING: 0

## 2023-01-18 NOTE — TELEPHONE ENCOUNTER
LSW made phone call to patient's daughter Sophia Strong.       [address] [facility] Older Adults Resource Guide  [employer]  Alzheimer's [place]

## 2023-02-21 ENCOUNTER — TELEPHONE (OUTPATIENT)
Dept: FAMILY MEDICINE CLINIC | Age: 83
End: 2023-02-21

## 2023-02-21 PROBLEM — F02.A4 MILD LATE ONSET ALZHEIMER'S DEMENTIA WITH ANXIETY (HCC): Status: ACTIVE | Noted: 2022-10-10

## 2023-02-21 NOTE — TELEPHONE ENCOUNTER
Called and talked with the daughter. Her mom read that she had dementia and became very upset. I apologized that I had not used this word with her mom in the office. This is a very upsetting thing for Michaeleen Bloch because her own mother had severe dementia. I did reassure Jose Garcia (the daughter) that her cognitive testing had not changed over the 2 MMSE that I preformed. We also discussed that her mother had decided against taking aricept and had not wanted any further work up of her memory issues. Jose Garcia will keep me informed if there is anything I can do to help with the situation. I expressed that I appreciated her calling.

## 2023-02-21 NOTE — TELEPHONE ENCOUNTER
Daughter Valeria Alvarado would like to speak to Dr. Phuc Echeverria directly about patient. Patient saw a copy of her medical records which stated that she had moderate onset of dementia and/or Alzheimer's and she is very angry.

## 2023-03-29 ENCOUNTER — OFFICE VISIT (OUTPATIENT)
Dept: PRIMARY CARE CLINIC | Age: 83
End: 2023-03-29
Payer: MEDICARE

## 2023-03-29 VITALS
RESPIRATION RATE: 16 BRPM | SYSTOLIC BLOOD PRESSURE: 138 MMHG | OXYGEN SATURATION: 97 % | BODY MASS INDEX: 23.22 KG/M2 | HEIGHT: 61 IN | TEMPERATURE: 97.4 F | WEIGHT: 123 LBS | HEART RATE: 64 BPM | DIASTOLIC BLOOD PRESSURE: 90 MMHG

## 2023-03-29 DIAGNOSIS — E78.00 PURE HYPERCHOLESTEROLEMIA: Primary | ICD-10-CM

## 2023-03-29 DIAGNOSIS — F41.1 GAD (GENERALIZED ANXIETY DISORDER): ICD-10-CM

## 2023-03-29 PROCEDURE — 99213 OFFICE O/P EST LOW 20 MIN: CPT | Performed by: FAMILY MEDICINE

## 2023-03-29 PROCEDURE — 1123F ACP DISCUSS/DSCN MKR DOCD: CPT | Performed by: FAMILY MEDICINE

## 2023-03-29 RX ORDER — MECLIZINE HYDROCHLORIDE 25 MG/1
TABLET ORAL
COMMUNITY
Start: 2022-04-22

## 2023-03-29 SDOH — ECONOMIC STABILITY: HOUSING INSECURITY
IN THE LAST 12 MONTHS, WAS THERE A TIME WHEN YOU DID NOT HAVE A STEADY PLACE TO SLEEP OR SLEPT IN A SHELTER (INCLUDING NOW)?: NO

## 2023-03-29 SDOH — ECONOMIC STABILITY: FOOD INSECURITY: WITHIN THE PAST 12 MONTHS, YOU WORRIED THAT YOUR FOOD WOULD RUN OUT BEFORE YOU GOT MONEY TO BUY MORE.: NEVER TRUE

## 2023-03-29 SDOH — ECONOMIC STABILITY: FOOD INSECURITY: WITHIN THE PAST 12 MONTHS, THE FOOD YOU BOUGHT JUST DIDN'T LAST AND YOU DIDN'T HAVE MONEY TO GET MORE.: NEVER TRUE

## 2023-03-29 SDOH — ECONOMIC STABILITY: INCOME INSECURITY: HOW HARD IS IT FOR YOU TO PAY FOR THE VERY BASICS LIKE FOOD, HOUSING, MEDICAL CARE, AND HEATING?: NOT HARD AT ALL

## 2023-03-29 ASSESSMENT — ENCOUNTER SYMPTOMS: SHORTNESS OF BREATH: 0

## 2023-03-29 NOTE — PROGRESS NOTES
Jennifer Burnham, a female of 78 y.o. came to the office 3/29/2023. Patient Active Problem List   Diagnosis    History of breast cancer    Episodic low back pain    Osteoarthritis of both hips    Osteopenia    Allergic rhinitis    Anxiety    Contact dermatitis    Urinary, incontinence, stress female    White coat syndrome with high blood pressure but without hypertension    Alopecia of scalp    Hyperlipidemia    Bilateral knee pain    Hx of skin cancer, basal cell    Hx of squamous cell carcinoma of skin    Nuclear senile cataract    Presbyopia    Vitreous degeneration    S/P total knee arthroplasty    Bilateral impacted cerumen    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 ([facility] [address]          Anxiety  Presents for follow-up visit. Symptoms include depressed mood, irritability and nervous/anxious behavior. Patient reports no chest pain, decreased concentration, excessive worry, insomnia, obsessions, panic or shortness of breath. Hyperlipidemia  This is a chronic problem. The current episode started more than 1 year ago. The problem is uncontrolled. Pertinent negatives include no chest pain, leg pain, myalgias or shortness of breath. Current antihyperlipidemic treatment includes statins. There are no compliance problems. - dealing with stress of  who has chronic medical issues ie Parkinson's Dementia. Can drive. Daughter stays over 2 nights a wk. Other nights someone stays there. Zoloft has helped but maybe more.     Allergies   Allergen Reactions    Aspirin Swelling and Other (See Comments)     Lips swelling    Penicillins      Unsure of reaction    Bactrim [Sulfamethoxazole-Trimethoprim]      Had terrible shaking       Current Outpatient Medications on File Prior to Visit   Medication Sig Dispense Refill    meclizine (ANTIVERT) 25 MG tablet Meclizine ([employer] Relief(Mecliz)) 25 MG Tablet Active 25 MG PO 3 times a day as needed April 22nd, 2022 12:00am      Multiple Vitamin

## 2023-04-06 ENCOUNTER — HOSPITAL ENCOUNTER (EMERGENCY)
Age: 83
Discharge: HOME | End: 2023-04-06
Attending: EMERGENCY MEDICINE
Payer: MEDICARE

## 2023-04-06 ENCOUNTER — APPOINTMENT (OUTPATIENT)
Dept: GENERAL RADIOLOGY | Age: 83
End: 2023-04-06
Payer: MEDICARE

## 2023-04-06 ENCOUNTER — APPOINTMENT (OUTPATIENT)
Dept: CT IMAGING | Age: 83
End: 2023-04-06
Payer: MEDICARE

## 2023-04-06 VITALS
BODY MASS INDEX: 22.66 KG/M2 | RESPIRATION RATE: 16 BRPM | TEMPERATURE: 98.4 F | DIASTOLIC BLOOD PRESSURE: 69 MMHG | SYSTOLIC BLOOD PRESSURE: 146 MMHG | HEART RATE: 69 BPM | OXYGEN SATURATION: 98 % | WEIGHT: 120 LBS | HEIGHT: 61 IN

## 2023-04-06 DIAGNOSIS — S09.90XA CLOSED HEAD INJURY, INITIAL ENCOUNTER: ICD-10-CM

## 2023-04-06 DIAGNOSIS — N30.01 ACUTE CYSTITIS WITH HEMATURIA: ICD-10-CM

## 2023-04-06 DIAGNOSIS — S49.91XA INJURY OF RIGHT UPPER EXTREMITY, INITIAL ENCOUNTER: ICD-10-CM

## 2023-04-06 DIAGNOSIS — R42 DIZZINESS: Primary | ICD-10-CM

## 2023-04-06 LAB
ANION GAP SERPL CALCULATED.3IONS-SCNC: 11 MMOL/L (ref 7–16)
BACTERIA URNS QL MICRO: ABNORMAL /HPF
BASOPHILS # BLD: 0.03 E9/L (ref 0–0.2)
BASOPHILS NFR BLD: 0.3 % (ref 0–2)
BILIRUB UR QL STRIP: NEGATIVE
BUN SERPL-MCNC: 19 MG/DL (ref 6–23)
CALCIUM SERPL-MCNC: 8.8 MG/DL (ref 8.6–10.2)
CHLORIDE SERPL-SCNC: 104 MMOL/L (ref 98–107)
CLARITY UR: CLEAR
CO2 SERPL-SCNC: 28 MMOL/L (ref 22–29)
COLOR UR: YELLOW
CREAT SERPL-MCNC: 0.7 MG/DL (ref 0.5–1)
EOSINOPHIL # BLD: 0.03 E9/L (ref 0.05–0.5)
EOSINOPHIL NFR BLD: 0.3 % (ref 0–6)
ERYTHROCYTE [DISTWIDTH] IN BLOOD BY AUTOMATED COUNT: 14 FL (ref 11.5–15)
GLUCOSE SERPL-MCNC: 108 MG/DL (ref 74–99)
GLUCOSE UR STRIP-MCNC: NEGATIVE MG/DL
HCT VFR BLD AUTO: 39 % (ref 34–48)
HGB BLD-MCNC: 12.8 G/DL (ref 11.5–15.5)
HGB UR QL STRIP: ABNORMAL
IMM GRANULOCYTES # BLD: 0.05 E9/L
IMM GRANULOCYTES NFR BLD: 0.5 % (ref 0–5)
KETONES UR STRIP-MCNC: NEGATIVE MG/DL
LEUKOCYTE ESTERASE UR QL STRIP: ABNORMAL
LYMPHOCYTES # BLD: 0.75 E9/L (ref 1.5–4)
LYMPHOCYTES NFR BLD: 8 % (ref 20–42)
MCH RBC QN AUTO: 28.6 PG (ref 26–35)
MCHC RBC AUTO-ENTMCNC: 32.8 % (ref 32–34.5)
MCV RBC AUTO: 87.2 FL (ref 80–99.9)
MONOCYTES # BLD: 0.61 E9/L (ref 0.1–0.95)
MONOCYTES NFR BLD: 6.5 % (ref 2–12)
NEUTROPHILS # BLD: 7.87 E9/L (ref 1.8–7.3)
NEUTS SEG NFR BLD: 84.4 % (ref 43–80)
NITRITE UR QL STRIP: NEGATIVE
PH UR STRIP: 8 [PH] (ref 5–9)
PLATELET # BLD AUTO: 169 E9/L (ref 130–450)
PMV BLD AUTO: 9.1 FL (ref 7–12)
POTASSIUM SERPL-SCNC: 3.9 MMOL/L (ref 3.5–5)
PROT UR STRIP-MCNC: NEGATIVE MG/DL
RBC # BLD AUTO: 4.47 E12/L (ref 3.5–5.5)
RBC #/AREA URNS HPF: ABNORMAL /HPF (ref 0–2)
SODIUM SERPL-SCNC: 143 MMOL/L (ref 132–146)
SP GR UR STRIP: 1.01 (ref 1–1.03)
TROPONIN, HIGH SENSITIVITY: 8 NG/L (ref 0–9)
UROBILINOGEN UR STRIP-ACNC: 0.2 E.U./DL
WBC # BLD: 9.3 E9/L (ref 4.5–11.5)
WBC #/AREA URNS HPF: >20 /HPF (ref 0–5)

## 2023-04-06 PROCEDURE — 85025 COMPLETE CBC W/AUTO DIFF WBC: CPT

## 2023-04-06 PROCEDURE — 70450 CT HEAD/BRAIN W/O DYE: CPT

## 2023-04-06 PROCEDURE — 71045 X-RAY EXAM CHEST 1 VIEW: CPT

## 2023-04-06 PROCEDURE — 72125 CT NECK SPINE W/O DYE: CPT

## 2023-04-06 PROCEDURE — 81001 URINALYSIS AUTO W/SCOPE: CPT

## 2023-04-06 PROCEDURE — 73030 X-RAY EXAM OF SHOULDER: CPT

## 2023-04-06 PROCEDURE — 93005 ELECTROCARDIOGRAM TRACING: CPT | Performed by: NURSE PRACTITIONER

## 2023-04-06 PROCEDURE — 6370000000 HC RX 637 (ALT 250 FOR IP): Performed by: NURSE PRACTITIONER

## 2023-04-06 PROCEDURE — 80048 BASIC METABOLIC PNL TOTAL CA: CPT

## 2023-04-06 PROCEDURE — 87088 URINE BACTERIA CULTURE: CPT

## 2023-04-06 PROCEDURE — 73090 X-RAY EXAM OF FOREARM: CPT

## 2023-04-06 PROCEDURE — 73060 X-RAY EXAM OF HUMERUS: CPT

## 2023-04-06 PROCEDURE — 84484 ASSAY OF TROPONIN QUANT: CPT

## 2023-04-06 RX ORDER — CEFDINIR 300 MG/1
300 CAPSULE ORAL 2 TIMES DAILY
Qty: 14 CAPSULE | Refills: 0 | Status: SHIPPED | OUTPATIENT
Start: 2023-04-06 | End: 2023-04-13

## 2023-04-06 RX ORDER — OXYCODONE HYDROCHLORIDE AND ACETAMINOPHEN 5; 325 MG/1; MG/1
1 TABLET ORAL ONCE
Status: COMPLETED | OUTPATIENT
Start: 2023-04-06 | End: 2023-04-06

## 2023-04-06 RX ADMIN — OXYCODONE AND ACETAMINOPHEN 1 TABLET: 5; 325 TABLET ORAL at 13:17

## 2023-04-06 ASSESSMENT — PAIN SCALES - GENERAL: PAINLEVEL_OUTOF10: 10

## 2023-04-06 ASSESSMENT — LIFESTYLE VARIABLES
HOW MANY STANDARD DRINKS CONTAINING ALCOHOL DO YOU HAVE ON A TYPICAL DAY: PATIENT DOES NOT DRINK
HOW OFTEN DO YOU HAVE A DRINK CONTAINING ALCOHOL: NEVER

## 2023-04-06 NOTE — ED PROVIDER NOTES
neurological deficits. NIH scale 0..  Vital signs stable. Differential diagnoses include but not limited to fracture was dislocation versus strain. Diagnostic studies including labs and imagining which was interpreted by radiologist reveals BNP was unremarkable. CBC was unremarkable troponin was 8. Patient has no chest pain and her symptoms started this morning greater than 1 hour prior to coming to the emergency room. Urinalysis had small amount of blood large leukocytes and many bacteria. Urine cultures pain at this time we will treat her for acute cystitis with hematuria. Chest x-ray reveals great vessel atrocities as noted. X-ray of right humerus, shoulder and radial are normal.  CT of the head reveals mucosal thickening in the right sinus maxillary. No acute endocrine abnormalities. Reveals mild osteoarthritis at the right [facility]. Pronounced osteoarthritis at the base of the thumb. .  CT cervical spine reveals there is no acute compression fractures. Mild degenerative changes noted. Consults included none. Results were discussed with patient and daughter. Patient was given percocet for their symptoms with moderate improvement. Patient will be discharged home with the following prescriptions, none. Discussed appropriate use and potential side effects of starting the prescribed medications. Patient continues to be non-toxic on re-evaluation. Findings were discussed with the patient and reasons to immediately return to the ED were articulated to them.  They will follow-up with their PMD.      Discharge Instructions:   Patient referred to  Ondina Schofield DO  [id number]    Call   to schedule an appt for follow up in 1-2 days    [address] [place] [address] Emergency Department  [address] [address]  [phone]  Go to   If symptoms worsen      MEDICATIONS:   DISCHARGE MEDICATIONS:  Discharge Medication List as of 4/6/2023

## 2023-04-07 LAB
EKG ATRIAL RATE: 64 BPM
EKG P AXIS: 65 DEGREES
EKG P-R INTERVAL: 136 MS
EKG Q-T INTERVAL: 434 MS
EKG QRS DURATION: 76 MS
EKG QTC CALCULATION (BAZETT): 447 MS
EKG R AXIS: -25 DEGREES
EKG T AXIS: 40 DEGREES
EKG VENTRICULAR RATE: 64 BPM

## 2023-04-07 PROCEDURE — 93010 ELECTROCARDIOGRAM REPORT: CPT | Performed by: INTERNAL MEDICINE

## 2023-04-08 LAB — BACTERIA UR CULT: NORMAL

## 2023-04-15 ENCOUNTER — HOSPITAL ENCOUNTER (INPATIENT)
Age: 83
LOS: 2 days | Discharge: HOME HEALTH | DRG: 536 | End: 2023-04-17
Attending: EMERGENCY MEDICINE | Admitting: INTERNAL MEDICINE
Payer: MEDICARE

## 2023-04-15 ENCOUNTER — APPOINTMENT (OUTPATIENT)
Dept: GENERAL RADIOLOGY | Age: 83
DRG: 536 | End: 2023-04-15
Payer: MEDICARE

## 2023-04-15 ENCOUNTER — APPOINTMENT (OUTPATIENT)
Dept: CT IMAGING | Age: 83
DRG: 536 | End: 2023-04-15
Payer: MEDICARE

## 2023-04-15 DIAGNOSIS — S32.591A CLOSED FRACTURE OF RAMUS OF RIGHT PUBIS, INITIAL ENCOUNTER (HCC): Primary | ICD-10-CM

## 2023-04-15 DIAGNOSIS — S32.591A PUBIC RAMUS FRACTURE, RIGHT, CLOSED, INITIAL ENCOUNTER (HCC): ICD-10-CM

## 2023-04-15 DIAGNOSIS — F02.B3 MODERATE LATE ONSET ALZHEIMER'S DEMENTIA WITH MOOD DISTURBANCE (HCC): ICD-10-CM

## 2023-04-15 DIAGNOSIS — G30.1 MODERATE LATE ONSET ALZHEIMER'S DEMENTIA WITH MOOD DISTURBANCE (HCC): ICD-10-CM

## 2023-04-15 PROBLEM — M19.91 PRIMARY OSTEOARTHRITIS: Status: ACTIVE | Noted: 2023-04-15

## 2023-04-15 PROBLEM — F03.A4 MILD DEMENTIA WITH ANXIETY (HCC): Status: ACTIVE | Noted: 2023-04-15

## 2023-04-15 PROBLEM — R32 URINARY INCONTINENCE: Status: ACTIVE | Noted: 2023-04-15

## 2023-04-15 PROBLEM — I10 CHRONIC HYPERTENSION: Status: ACTIVE | Noted: 2023-04-15

## 2023-04-15 PROBLEM — E78.2 COMBINED HYPERLIPIDEMIA: Status: ACTIVE | Noted: 2023-04-15

## 2023-04-15 LAB
ANION GAP SERPL CALCULATED.3IONS-SCNC: 10 MMOL/L (ref 7–16)
BASOPHILS # BLD: 0.05 E9/L (ref 0–0.2)
BASOPHILS NFR BLD: 1 % (ref 0–2)
BUN SERPL-MCNC: 17 MG/DL (ref 6–23)
CALCIUM SERPL-MCNC: 8.6 MG/DL (ref 8.6–10.2)
CHLORIDE SERPL-SCNC: 107 MMOL/L (ref 98–107)
CO2 SERPL-SCNC: 25 MMOL/L (ref 22–29)
CREAT SERPL-MCNC: 0.7 MG/DL (ref 0.5–1)
EOSINOPHIL # BLD: 0.11 E9/L (ref 0.05–0.5)
EOSINOPHIL NFR BLD: 2.3 % (ref 0–6)
ERYTHROCYTE [DISTWIDTH] IN BLOOD BY AUTOMATED COUNT: 14.1 FL (ref 11.5–15)
GLUCOSE SERPL-MCNC: 78 MG/DL (ref 74–99)
HCT VFR BLD AUTO: 40.4 % (ref 34–48)
HGB BLD-MCNC: 13 G/DL (ref 11.5–15.5)
IMM GRANULOCYTES # BLD: 0.02 E9/L
IMM GRANULOCYTES NFR BLD: 0.4 % (ref 0–5)
LYMPHOCYTES # BLD: 1.12 E9/L (ref 1.5–4)
LYMPHOCYTES NFR BLD: 23.5 % (ref 20–42)
MCH RBC QN AUTO: 29 PG (ref 26–35)
MCHC RBC AUTO-ENTMCNC: 32.2 % (ref 32–34.5)
MCV RBC AUTO: 90 FL (ref 80–99.9)
MONOCYTES # BLD: 0.49 E9/L (ref 0.1–0.95)
MONOCYTES NFR BLD: 10.3 % (ref 2–12)
NEUTROPHILS # BLD: 2.98 E9/L (ref 1.8–7.3)
NEUTS SEG NFR BLD: 62.5 % (ref 43–80)
PLATELET # BLD AUTO: 238 E9/L (ref 130–450)
PMV BLD AUTO: 9.4 FL (ref 7–12)
POTASSIUM SERPL-SCNC: 4.8 MMOL/L (ref 3.5–5)
RBC # BLD AUTO: 4.49 E12/L (ref 3.5–5.5)
REASON FOR REJECTION: NORMAL
REJECTED TEST: NORMAL
SODIUM SERPL-SCNC: 142 MMOL/L (ref 132–146)
WBC # BLD: 4.8 E9/L (ref 4.5–11.5)

## 2023-04-15 PROCEDURE — APPSS30 APP SPLIT SHARED TIME 16-30 MINUTES: Performed by: NURSE PRACTITIONER

## 2023-04-15 PROCEDURE — 99222 1ST HOSP IP/OBS MODERATE 55: CPT | Performed by: INTERNAL MEDICINE

## 2023-04-15 PROCEDURE — 85025 COMPLETE CBC W/AUTO DIFF WBC: CPT

## 2023-04-15 PROCEDURE — 99285 EMERGENCY DEPT VISIT HI MDM: CPT

## 2023-04-15 PROCEDURE — 6370000000 HC RX 637 (ALT 250 FOR IP): Performed by: STUDENT IN AN ORGANIZED HEALTH CARE EDUCATION/TRAINING PROGRAM

## 2023-04-15 PROCEDURE — 73502 X-RAY EXAM HIP UNI 2-3 VIEWS: CPT

## 2023-04-15 PROCEDURE — 2580000003 HC RX 258: Performed by: NURSE PRACTITIONER

## 2023-04-15 PROCEDURE — 80048 BASIC METABOLIC PNL TOTAL CA: CPT

## 2023-04-15 PROCEDURE — 6360000002 HC RX W HCPCS: Performed by: NURSE PRACTITIONER

## 2023-04-15 PROCEDURE — 1200000000 HC SEMI PRIVATE

## 2023-04-15 PROCEDURE — 6370000000 HC RX 637 (ALT 250 FOR IP): Performed by: NURSE PRACTITIONER

## 2023-04-15 PROCEDURE — 96374 THER/PROPH/DIAG INJ IV PUSH: CPT

## 2023-04-15 PROCEDURE — 6360000002 HC RX W HCPCS: Performed by: EMERGENCY MEDICINE

## 2023-04-15 PROCEDURE — 72192 CT PELVIS W/O DYE: CPT

## 2023-04-15 RX ORDER — ONDANSETRON 2 MG/ML
4 INJECTION INTRAMUSCULAR; INTRAVENOUS EVERY 6 HOURS PRN
Status: DISCONTINUED | OUTPATIENT
Start: 2023-04-15 | End: 2023-04-17 | Stop reason: HOSPADM

## 2023-04-15 RX ORDER — SODIUM CHLORIDE 0.9 % (FLUSH) 0.9 %
5-40 SYRINGE (ML) INJECTION PRN
Status: DISCONTINUED | OUTPATIENT
Start: 2023-04-15 | End: 2023-04-17 | Stop reason: HOSPADM

## 2023-04-15 RX ORDER — TRAMADOL HYDROCHLORIDE 50 MG/1
50 TABLET ORAL EVERY 6 HOURS PRN
Status: DISCONTINUED | OUTPATIENT
Start: 2023-04-15 | End: 2023-04-17 | Stop reason: HOSPADM

## 2023-04-15 RX ORDER — MORPHINE SULFATE 2 MG/ML
2 INJECTION, SOLUTION INTRAMUSCULAR; INTRAVENOUS EVERY 4 HOURS PRN
Status: DISCONTINUED | OUTPATIENT
Start: 2023-04-15 | End: 2023-04-17 | Stop reason: HOSPADM

## 2023-04-15 RX ORDER — KETOROLAC TROMETHAMINE 30 MG/ML
15 INJECTION, SOLUTION INTRAMUSCULAR; INTRAVENOUS EVERY 8 HOURS
Status: DISCONTINUED | OUTPATIENT
Start: 2023-04-15 | End: 2023-04-17 | Stop reason: HOSPADM

## 2023-04-15 RX ORDER — ATORVASTATIN CALCIUM 10 MG/1
5 TABLET, FILM COATED ORAL NIGHTLY
Status: DISCONTINUED | OUTPATIENT
Start: 2023-04-15 | End: 2023-04-17 | Stop reason: HOSPADM

## 2023-04-15 RX ORDER — SODIUM CHLORIDE 9 MG/ML
INJECTION, SOLUTION INTRAVENOUS PRN
Status: DISCONTINUED | OUTPATIENT
Start: 2023-04-15 | End: 2023-04-17 | Stop reason: HOSPADM

## 2023-04-15 RX ORDER — SERTRALINE HYDROCHLORIDE 100 MG/1
100 TABLET, FILM COATED ORAL NIGHTLY
Status: DISCONTINUED | OUTPATIENT
Start: 2023-04-15 | End: 2023-04-15

## 2023-04-15 RX ORDER — ENOXAPARIN SODIUM 100 MG/ML
40 INJECTION SUBCUTANEOUS DAILY
Status: DISCONTINUED | OUTPATIENT
Start: 2023-04-15 | End: 2023-04-17 | Stop reason: HOSPADM

## 2023-04-15 RX ORDER — FENTANYL CITRATE 50 UG/ML
25 INJECTION, SOLUTION INTRAMUSCULAR; INTRAVENOUS ONCE
Status: COMPLETED | OUTPATIENT
Start: 2023-04-15 | End: 2023-04-15

## 2023-04-15 RX ORDER — ACETAMINOPHEN 500 MG
500 TABLET ORAL EVERY 8 HOURS SCHEDULED
Status: DISCONTINUED | OUTPATIENT
Start: 2023-04-15 | End: 2023-04-16

## 2023-04-15 RX ORDER — ONDANSETRON 4 MG/1
4 TABLET, ORALLY DISINTEGRATING ORAL EVERY 8 HOURS PRN
Status: DISCONTINUED | OUTPATIENT
Start: 2023-04-15 | End: 2023-04-17 | Stop reason: HOSPADM

## 2023-04-15 RX ORDER — SODIUM CHLORIDE 0.9 % (FLUSH) 0.9 %
5-40 SYRINGE (ML) INJECTION EVERY 12 HOURS SCHEDULED
Status: DISCONTINUED | OUTPATIENT
Start: 2023-04-15 | End: 2023-04-17 | Stop reason: HOSPADM

## 2023-04-15 RX ORDER — POLYETHYLENE GLYCOL 3350 17 G/17G
17 POWDER, FOR SOLUTION ORAL DAILY PRN
Status: DISCONTINUED | OUTPATIENT
Start: 2023-04-15 | End: 2023-04-17 | Stop reason: HOSPADM

## 2023-04-15 RX ORDER — MULTIVITAMIN WITH IRON
1 TABLET ORAL DAILY
Status: DISCONTINUED | OUTPATIENT
Start: 2023-04-15 | End: 2023-04-17 | Stop reason: HOSPADM

## 2023-04-15 RX ADMIN — ACETAMINOPHEN 500 MG: 500 TABLET ORAL at 22:31

## 2023-04-15 RX ADMIN — KETOROLAC TROMETHAMINE 15 MG: 30 INJECTION, SOLUTION INTRAMUSCULAR at 20:27

## 2023-04-15 RX ADMIN — ENOXAPARIN SODIUM 40 MG: 100 INJECTION SUBCUTANEOUS at 20:27

## 2023-04-15 RX ADMIN — SODIUM CHLORIDE, PRESERVATIVE FREE 10 ML: 5 INJECTION INTRAVENOUS at 20:27

## 2023-04-15 RX ADMIN — ATORVASTATIN CALCIUM 5 MG: 10 TABLET, FILM COATED ORAL at 20:26

## 2023-04-15 RX ADMIN — FENTANYL CITRATE 25 MCG: 50 INJECTION, SOLUTION INTRAMUSCULAR; INTRAVENOUS at 13:54

## 2023-04-15 RX ADMIN — SERTRALINE 150 MG: 100 TABLET, FILM COATED ORAL at 20:26

## 2023-04-15 ASSESSMENT — PAIN SCALES - GENERAL
PAINLEVEL_OUTOF10: 3
PAINLEVEL_OUTOF10: 5
PAINLEVEL_OUTOF10: 3
PAINLEVEL_OUTOF10: 9
PAINLEVEL_OUTOF10: 6
PAINLEVEL_OUTOF10: 4

## 2023-04-15 ASSESSMENT — PAIN DESCRIPTION - ORIENTATION
ORIENTATION: RIGHT

## 2023-04-15 ASSESSMENT — PAIN DESCRIPTION - DESCRIPTORS
DESCRIPTORS: STABBING
DESCRIPTORS: STABBING
DESCRIPTORS: SHARP;SORE;SHOOTING

## 2023-04-15 ASSESSMENT — PAIN DESCRIPTION - LOCATION
LOCATION: GROIN
LOCATION: HIP;PELVIS
LOCATION: PELVIS;HIP
LOCATION: PELVIS;HIP

## 2023-04-15 ASSESSMENT — PAIN - FUNCTIONAL ASSESSMENT
PAIN_FUNCTIONAL_ASSESSMENT: PREVENTS OR INTERFERES SOME ACTIVE ACTIVITIES AND ADLS
PAIN_FUNCTIONAL_ASSESSMENT: 0-10

## 2023-04-16 LAB
ALBUMIN SERPL-MCNC: 3.9 G/DL (ref 3.5–5.2)
ALP SERPL-CCNC: 77 U/L (ref 35–104)
ALT SERPL-CCNC: 10 U/L (ref 0–32)
ANION GAP SERPL CALCULATED.3IONS-SCNC: 10 MMOL/L (ref 7–16)
AST SERPL-CCNC: 18 U/L (ref 0–31)
BASOPHILS # BLD: 0.05 E9/L (ref 0–0.2)
BASOPHILS NFR BLD: 0.9 % (ref 0–2)
BILIRUB SERPL-MCNC: 0.3 MG/DL (ref 0–1.2)
BUN SERPL-MCNC: 21 MG/DL (ref 6–23)
CALCIUM SERPL-MCNC: 8.9 MG/DL (ref 8.6–10.2)
CHLORIDE SERPL-SCNC: 105 MMOL/L (ref 98–107)
CO2 SERPL-SCNC: 26 MMOL/L (ref 22–29)
CREAT SERPL-MCNC: 0.8 MG/DL (ref 0.5–1)
EOSINOPHIL # BLD: 0.17 E9/L (ref 0.05–0.5)
EOSINOPHIL NFR BLD: 3 % (ref 0–6)
ERYTHROCYTE [DISTWIDTH] IN BLOOD BY AUTOMATED COUNT: 13.9 FL (ref 11.5–15)
GLUCOSE SERPL-MCNC: 90 MG/DL (ref 74–99)
HCT VFR BLD AUTO: 38.4 % (ref 34–48)
HGB BLD-MCNC: 12.3 G/DL (ref 11.5–15.5)
IMM GRANULOCYTES # BLD: 0.02 E9/L
IMM GRANULOCYTES NFR BLD: 0.4 % (ref 0–5)
LYMPHOCYTES # BLD: 2.03 E9/L (ref 1.5–4)
LYMPHOCYTES NFR BLD: 36 % (ref 20–42)
MCH RBC QN AUTO: 28.1 PG (ref 26–35)
MCHC RBC AUTO-ENTMCNC: 32 % (ref 32–34.5)
MCV RBC AUTO: 87.9 FL (ref 80–99.9)
MONOCYTES # BLD: 0.61 E9/L (ref 0.1–0.95)
MONOCYTES NFR BLD: 10.8 % (ref 2–12)
NEUTROPHILS # BLD: 2.76 E9/L (ref 1.8–7.3)
NEUTS SEG NFR BLD: 48.9 % (ref 43–80)
PLATELET # BLD AUTO: 211 E9/L (ref 130–450)
PMV BLD AUTO: 8.4 FL (ref 7–12)
POTASSIUM SERPL-SCNC: 4.2 MMOL/L (ref 3.5–5)
PROT SERPL-MCNC: 6.5 G/DL (ref 6.4–8.3)
RBC # BLD AUTO: 4.37 E12/L (ref 3.5–5.5)
SODIUM SERPL-SCNC: 141 MMOL/L (ref 132–146)
WBC # BLD: 5.6 E9/L (ref 4.5–11.5)

## 2023-04-16 PROCEDURE — 99232 SBSQ HOSP IP/OBS MODERATE 35: CPT | Performed by: INTERNAL MEDICINE

## 2023-04-16 PROCEDURE — 6360000002 HC RX W HCPCS: Performed by: NURSE PRACTITIONER

## 2023-04-16 PROCEDURE — 2580000003 HC RX 258: Performed by: NURSE PRACTITIONER

## 2023-04-16 PROCEDURE — 6370000000 HC RX 637 (ALT 250 FOR IP): Performed by: NURSE PRACTITIONER

## 2023-04-16 PROCEDURE — 80053 COMPREHEN METABOLIC PANEL: CPT

## 2023-04-16 PROCEDURE — 1200000000 HC SEMI PRIVATE

## 2023-04-16 PROCEDURE — 85025 COMPLETE CBC W/AUTO DIFF WBC: CPT

## 2023-04-16 PROCEDURE — APPSS30 APP SPLIT SHARED TIME 16-30 MINUTES: Performed by: NURSE PRACTITIONER

## 2023-04-16 PROCEDURE — 36415 COLL VENOUS BLD VENIPUNCTURE: CPT

## 2023-04-16 PROCEDURE — 6370000000 HC RX 637 (ALT 250 FOR IP): Performed by: STUDENT IN AN ORGANIZED HEALTH CARE EDUCATION/TRAINING PROGRAM

## 2023-04-16 RX ORDER — ACETAMINOPHEN 500 MG
1000 TABLET ORAL EVERY 8 HOURS SCHEDULED
Status: DISCONTINUED | OUTPATIENT
Start: 2023-04-16 | End: 2023-04-17 | Stop reason: HOSPADM

## 2023-04-16 RX ADMIN — SODIUM CHLORIDE, PRESERVATIVE FREE 10 ML: 5 INJECTION INTRAVENOUS at 20:29

## 2023-04-16 RX ADMIN — KETOROLAC TROMETHAMINE 15 MG: 30 INJECTION, SOLUTION INTRAMUSCULAR at 20:27

## 2023-04-16 RX ADMIN — ACETAMINOPHEN 1000 MG: 500 TABLET ORAL at 13:31

## 2023-04-16 RX ADMIN — ATORVASTATIN CALCIUM 5 MG: 10 TABLET, FILM COATED ORAL at 20:27

## 2023-04-16 RX ADMIN — ENOXAPARIN SODIUM 40 MG: 100 INJECTION SUBCUTANEOUS at 08:55

## 2023-04-16 RX ADMIN — ACETAMINOPHEN 1000 MG: 500 TABLET ORAL at 20:27

## 2023-04-16 RX ADMIN — MULTIVITAMIN TABLET 1 TABLET: TABLET at 08:56

## 2023-04-16 RX ADMIN — SODIUM CHLORIDE, PRESERVATIVE FREE 10 ML: 5 INJECTION INTRAVENOUS at 08:55

## 2023-04-16 RX ADMIN — ACETAMINOPHEN 500 MG: 500 TABLET ORAL at 05:38

## 2023-04-16 RX ADMIN — KETOROLAC TROMETHAMINE 15 MG: 30 INJECTION, SOLUTION INTRAMUSCULAR at 05:38

## 2023-04-16 RX ADMIN — KETOROLAC TROMETHAMINE 15 MG: 30 INJECTION, SOLUTION INTRAMUSCULAR at 13:30

## 2023-04-16 RX ADMIN — SERTRALINE 150 MG: 100 TABLET, FILM COATED ORAL at 20:27

## 2023-04-16 ASSESSMENT — PAIN DESCRIPTION - LOCATION
LOCATION: PELVIS;HIP
LOCATION: ARM
LOCATION: PELVIS;HIP

## 2023-04-16 ASSESSMENT — PAIN DESCRIPTION - DESCRIPTORS
DESCRIPTORS: DISCOMFORT;ACHING
DESCRIPTORS: STABBING;DISCOMFORT
DESCRIPTORS: SORE;SHARP

## 2023-04-16 ASSESSMENT — PAIN DESCRIPTION - ORIENTATION
ORIENTATION: RIGHT

## 2023-04-16 ASSESSMENT — PAIN SCALES - GENERAL
PAINLEVEL_OUTOF10: 1
PAINLEVEL_OUTOF10: 4
PAINLEVEL_OUTOF10: 4

## 2023-04-17 VITALS
SYSTOLIC BLOOD PRESSURE: 138 MMHG | TEMPERATURE: 97.9 F | RESPIRATION RATE: 18 BRPM | BODY MASS INDEX: 23.22 KG/M2 | WEIGHT: 123 LBS | OXYGEN SATURATION: 98 % | HEIGHT: 61 IN | DIASTOLIC BLOOD PRESSURE: 52 MMHG | HEART RATE: 70 BPM

## 2023-04-17 PROCEDURE — 2580000003 HC RX 258: Performed by: NURSE PRACTITIONER

## 2023-04-17 PROCEDURE — 6360000002 HC RX W HCPCS: Performed by: NURSE PRACTITIONER

## 2023-04-17 PROCEDURE — 97161 PT EVAL LOW COMPLEX 20 MIN: CPT

## 2023-04-17 PROCEDURE — 97530 THERAPEUTIC ACTIVITIES: CPT

## 2023-04-17 PROCEDURE — 99239 HOSP IP/OBS DSCHRG MGMT >30: CPT | Performed by: INTERNAL MEDICINE

## 2023-04-17 PROCEDURE — 6370000000 HC RX 637 (ALT 250 FOR IP): Performed by: NURSE PRACTITIONER

## 2023-04-17 RX ORDER — ACETAMINOPHEN 500 MG
1000 TABLET ORAL EVERY 8 HOURS
Qty: 360 TABLET | Refills: 1 | COMMUNITY
Start: 2023-04-17 | End: 2023-04-22

## 2023-04-17 RX ORDER — HYDRALAZINE HYDROCHLORIDE 10 MG/1
10 TABLET, FILM COATED ORAL EVERY 8 HOURS PRN
Status: DISCONTINUED | OUTPATIENT
Start: 2023-04-17 | End: 2023-04-17 | Stop reason: HOSPADM

## 2023-04-17 RX ORDER — SERTRALINE HYDROCHLORIDE 100 MG/1
150 TABLET, FILM COATED ORAL NIGHTLY
Qty: 30 TABLET | Refills: 0 | Status: SHIPPED
Start: 2023-04-17

## 2023-04-17 RX ORDER — TRAMADOL HYDROCHLORIDE 50 MG/1
50 TABLET ORAL EVERY 6 HOURS PRN
Qty: 12 TABLET | Refills: 0 | Status: SHIPPED | OUTPATIENT
Start: 2023-04-17 | End: 2023-04-20

## 2023-04-17 RX ORDER — IBUPROFEN 600 MG/1
600 TABLET ORAL EVERY 8 HOURS
Qty: 9 TABLET | Refills: 0 | Status: SHIPPED | OUTPATIENT
Start: 2023-04-17 | End: 2023-04-20

## 2023-04-17 RX ADMIN — ENOXAPARIN SODIUM 40 MG: 100 INJECTION SUBCUTANEOUS at 08:26

## 2023-04-17 RX ADMIN — KETOROLAC TROMETHAMINE 15 MG: 30 INJECTION, SOLUTION INTRAMUSCULAR at 12:23

## 2023-04-17 RX ADMIN — MULTIVITAMIN TABLET 1 TABLET: TABLET at 08:26

## 2023-04-17 RX ADMIN — SODIUM CHLORIDE, PRESERVATIVE FREE 10 ML: 5 INJECTION INTRAVENOUS at 08:27

## 2023-04-17 NOTE — PROGRESS NOTES
Patient discharge paperwork reviewed with patient and her daughter. All questions answered. Informed them that Jimmy Gramajoburke Andrew will be out tomorrow to imitate care.     Electronically signed by Ann Marie You RN on 4/17/2023 at 2:34 PM

## 2023-04-17 NOTE — PROGRESS NOTES
Physical Therapy  Facility/Department: [facility] MED SURG  Physical Therapy Initial Assessment    Name: Nelsy Capellan  : 1940  MRN: [id number]  Date of Service: 2023      Attending Provider:  Jam Nogueira DO    Evaluating PT:  Paul Calderon POFELIA Room #:  1325/9053-I  Diagnosis:  Pubic ramus fracture, right, closed, initial encounter [facility]) [S32.598H]  Orthopedic recommendations: stable non-displaced R superior and inferior pelvic rami fractures, non-surgical intervention, WBAT RLE  Precautions:  WBAT RLE, falls, bed/chair alarm    SUBJECTIVE:    Pt lives with her  who has Parkinson's disease and she is his caregiver. They do have services coming into the home with assist with her 's care. They live in a 2 story home with 2 stairs and 2 rails to enter. They have a first floor set up. Pt ambulated with a ww or a cane PRN. OBJECTIVE:   Initial Evaluation  Date: 23 Treatment Short Term/ Long Term   Goals   Was pt agreeable to Eval/treatment? yes     Does pt have pain? No c/o pain at rest, but c/o R hip area pain with standing. Bed Mobility  Rolling: Independent  Supine to sit: Independent  Sit to supine: Independent  Scooting: Independent  Independent   Transfers Sit to stand: supervision with VCs  Stand to sit: supervision  Stand pivot: supervision with ww  Independent    Ambulation   150 feet with ww supervision  250 feet with ww Independent    Stair negotiation: ascended and descended 2 steps with both hands on 1 rail sideways approach with SBA  2 steps with 1 rail and a cane if needed supervision   AM-PAC 6 Clicks /       BLE ROM is WFL. LLE strength is grossly 4/5 to 4+/5 and RLE is grossly 4-/5 to 4/5.    Sensation:  Pt denies numbness and tingling to extremities  Balance: sitting is Independent and standing with ww is supervision  Endurance: fair+    Patient education  Pt educated on hand placement during transfers, gait sequence with ww, and

## 2023-04-17 NOTE — PROGRESS NOTES
Physician Progress Note      PATIENT:               Pearl Chu  CSN #:                  [id number]  :                       1940  ADMIT DATE:       4/15/2023 11:19 AM  DISCH DATE:  Lisette Gonzalez  PROVIDER #:        Naya Gusman DO          QUERY TEXT:    Pt admitted with pelvic fractures. Pt noted to have osteopenia. If possible,   please document in progress notes and discharge summary if you are evaluating   and/or treating any of the following: The medical record reflects the following:  Risk Factors: osteopenia  Clinical Indicators: CT pelvis \". Alphonse Luke Alphonse Luke Difficult to exclude extension of the right   superior pubic ramus fracture into the acetabulum given osteopenia and   adjacent streak artifact. \"  PMH. ..osteopenia  Treatment: non-operative management, pain management, PT/OT consults    Thank you,  John Allen RN, CCDS  Clinical Documentation   [email or web address]  Options provided:  -- Pathological pelvic fractures due to osteopenia  -- Pathological pelvic fractures due to osteopenia following fall which would   not usually break a normal, healthy bone  -- Traumatic pelvic fractures  -- Other - I will add my own diagnosis  -- Disagree - Not applicable / Not valid  -- Disagree - Clinically unable to determine / Unknown  -- Refer to Clinical Documentation Reviewer    PROVIDER RESPONSE TEXT:    This patient has a traumatic pelvic fractures.     Query created by: Daryle Messing on 2023 10:27 AM      Electronically signed by:  Betzaida Billingsley DO 2023 1:03 PM

## 2023-04-17 NOTE — PLAN OF CARE
[facility] Addendum    I have personally participated in history and physical exam, and have personally and independently reviewed vitals, labs, imaging reports, microbiology studies, and cardiac studies as appropriate. This participation and review was done on the date of service and in addition to that of Graeme Davenport NP. I have reviewed the documentation of JERZY, with additional thoughts and/or corrections as follows:    Physical Exam  Vitals: BP (!) 138/52 Comment: manual  Pulse 70   Temp 97.9 °F (36.6 °C) (Oral)   Resp 18   Ht 5' 1\" (1.549 m)   Wt 123 lb (55.8 kg)   SpO2 98%   BMI 23.24 kg/m²   General: well-developed, well-nourished, no acute distress, cooperative  Skin: generally warm, dry, and intact, with normal color  HEENT: normocephalic, atraumatic, no gross abnormalities  Respiratory: clear to auscultation bilaterally without respiratory distress  Cardiovascular: regular rate and rhythm without murmur / rub / gallop  Abdominal: soft, nontender, nondistended, normoactive bowel sounds  Extremities: no obvious edema or deformity  Neurologic: awake, alert, no gross deficits  Psychiatric: normal affect, cooperative    Assessment / Plan  Subacute right super and inferior nondisplaced pubic rami fractures - continue pain control, so far has only needed Tylenol and Toradol but also has Tramadol and morphine available if needed. Ortho has seen, fx are nonoperative as expected. AMPAC was 20, pain more well-controlled. Patient's family does have care arranged for at home and I feel she stable for transition at this time. Hx anxiety, HPL, HTN, OA, urinary incontinence - resume home meds as able    Otherwise as per NP's note. Please see orders for further plan of care.     Electronically signed by Jasmeet Guevara DO on 4/17/2023 at 8:41 AM

## 2023-04-17 NOTE — CARE COORDINATION
Introduced my self and provided explanation of CM role to patient and her daughter Alysha Enriquez. Patient is awake, alert, and aware of current diagnosis and treatment plan including pain control, ortho consult, pt/ot eval, discharge planning. She voices she resides at home with her spouse and completes her adl's with some assistance from her daughter. There are private duty caregivers in place for patient's . Daughter is looking to expand on those. A list of private duty care agencies is provided. Daughter will initiate referral.  A list of services provided by [facility] is provided and reviewed. Daughter again voices intention to self refer. A list of Assisted Living facilities is given per request.    A list of [place] [address] agencies is provided and reviewed. Selection of [facility] was made and referral has been initiated with Candi Agrawal. Will await her review and response regarding availability/acceptance. Patient is established with a pcp and denies any issue with retail pharmaceutical coverage. Explained ELOS of 24 hours; patient voiced understanding and agreement. Will follow along with  and assist with discharge planning as necessary. Case Management Assessment  Initial Evaluation    Date/Time of Evaluation: 4/17/2023 11:58 AM  Assessment Completed by: Sharmila Frausto RN    If patient is discharged prior to next notation, then this note serves as note for discharge by case management. Patient Name: Ingris Roberto                   YOB: 1940  Diagnosis: Pubic ramus fracture, right, closed, initial encounter [facility]) Marcel Door                   Date / Time: 4/15/2023 11:19 AM    Patient Admission Status: Inpatient   Readmission Risk (Low < 19, Mod (19-27), High > 27): Readmission Risk Score: 7.8    Current PCP: Helen Chan, DO  PCP verified by CM?  Yes    Chart Reviewed: Yes      History Provided by: Patient, Child/Family  Patient Orientation: Alert and

## 2023-04-17 NOTE — CARE COORDINATION
[facility] confirmed acceptance of patient and have placed her on schedule for initial visit 4/18/2023. Oleksandr Quiñones, MSN RN  [facility] Case Management  [phone]

## 2023-04-17 NOTE — PATIENT CARE CONFERENCE
P Quality Flow/Interdisciplinary Rounds Progress Note        Quality Flow Rounds held on April 17, 2023    Disciplines Attending:  Bedside Nurse, , , and Nursing Unit Leadership    Nelsy Capellan was admitted on 4/15/2023 11:19 AM    Anticipated Discharge Date:  Expected Discharge Date: 04/18/23    Disposition:    Yonas Score:  Yonas Scale Score: 19    Readmission Risk              Risk of Unplanned Readmission:  8           Discussed patient goal for the day, patient clinical progression, and barriers to discharge.   The following Goal(s) of the Day/Commitment(s) have been identified:  dc planning      Carolyne Ramirez RN  April 17, 2023

## 2023-04-17 NOTE — ACP (ADVANCE CARE PLANNING)
Advance Care Planning   Healthcare Decision Maker:    Primary Decision Maker: Vahid Rosado Child - [phone]    Secondary Decision Maker: Dominga Calixto - Spouse - [phone]    Click here to complete Healthcare Decision Makers including selection of the Healthcare Decision Maker Relationship (ie \"Primary\"). Today we documented Decision Maker(s). The patient will provide ACP documents.

## 2023-04-17 NOTE — DISCHARGE SUMMARY
[facility] Physician Discharge Summary       Brandon Brown DO  [facility] [address]  [phone]    Follow up in 1 week(s)  for hospital follow up      Activity level: As tolerated     Dispo: Home with home health care      Condition on discharge: Stable     Patient ID:  Matthew Foster  [id number]  78 y.o.  1940    Admit date: 4/15/2023    Discharge date and time:  4/17/2023  1:02 PM    Admission Diagnoses: Principal Problem:    Pubic ramus fracture, right, closed, initial encounter [facility])  Active Problems:    Closed fracture of ramus of right pubis ([address]    Urinary incontinence    Mild dementia with anxiety ([facility]    Primary osteoarthritis    Combined hyperlipidemia    Chronic hypertension  Resolved Problems:    * No resolved hospital problems. *      Discharge Diagnoses: Principal Problem:    Pubic ramus fracture, right, closed, initial encounter [facility])  Active Problems:    Closed fracture of ramus of right pubis (HCC)    Urinary incontinence    Mild dementia with anxiety ([facility] [address] [facility]    Primary osteoarthritis    Combined hyperlipidemia    Chronic hypertension  Resolved Problems:    * No resolved hospital problems. *      Consults:  IP CONSULT TO INTERNAL MEDICINE  IP CONSULT TO CASE MANAGEMENT  IP CONSULT TO everbill Course:   Patient Matthew Foster is a 78 y.o. presented with Pubic ramus fracture, right, closed, initial encounter ([address] Santana Jacobs is an 78year old woman with hx of mild dementia, HLD, situational HTN, OA is s/p right JAMESON. Had a mechanical fall about 9 days ago. Initially was complaining just of upper extremity pain. She was eval in ED with arm xrays which were unremarkable. Over the following days then complained of hip and right groin/pelvic pain that worsened. She has been unable to ambulate and attempts at Tylenol have been ineffective.   Returned to ED, CT pelvis shows non displaced superior and inferior

## 2023-04-18 ENCOUNTER — TELEPHONE (OUTPATIENT)
Dept: PRIMARY CARE CLINIC | Age: 83
End: 2023-04-18

## 2023-04-18 DIAGNOSIS — S32.810D MULTIPLE CLOSED FRACTURES OF PELVIS WITH STABLE DISRUPTION OF PELVIC RING WITH ROUTINE HEALING, SUBSEQUENT ENCOUNTER: Primary | ICD-10-CM

## 2023-04-18 RX ORDER — TRAMADOL HYDROCHLORIDE 50 MG/1
50 TABLET ORAL EVERY 12 HOURS PRN
Qty: 30 TABLET | Refills: 0 | Status: SHIPPED | OUTPATIENT
Start: 2023-04-18 | End: 2023-05-03

## 2023-04-18 NOTE — TELEPHONE ENCOUNTER
Pt was in hospital fractured her pelvis is in a lot of pain taking the tramadol in am and pm daughter does not want to run out only thing that Is helping her can you call in more to [facility] for her?

## 2023-04-18 NOTE — TELEPHONE ENCOUNTER
Continue tramadol 1 twice a day as needed for her pelvic fracture. 15 days called in. Controlled substances monitoring: no signs of potential drug abuse or diversion identified.

## 2023-04-18 NOTE — TELEPHONE ENCOUNTER
Care Transitions Initial Follow Up Call    Outreach made within 2 business days of discharge: Yes    Patient: Aleyda Kent Patient : 1940   MRN: [id number]  Reason for Admission: There are no discharge diagnoses documented for the most recent discharge. Discharge Date: 23       Spoke with: Kay Gomes    Discharge department/facility: [facility] Interactive Patient Contact:  Was patient able to fill all prescriptions: Yes  Was patient instructed to bring all medications to the follow-up visit: Yes  Is patient taking all medications as directed in the discharge summary? Yes  Does patient understand their discharge instructions: Yes  Does patient have questions or concerns that need addressed prior to 7-14 day follow up office visit: no    Scheduled appointment with PCP within 7-14 days   Patient has a fractured pelvis and is not mobile to come in for appointment at this time.    Follow Up  Future Appointments   Date Time Provider Cornel Camarena   2023  1:15 PM Sophie Humphrey MD [facility]       Denis Arnold, [place]

## 2023-04-18 NOTE — ED PROVIDER NOTES
atraumatic  Eyes: PERRL, EOMI, conjunctiva normal, sclera non icteric  ENT:  Oropharynx clear, handling secretions, no trismus, no asymmetry of the posterior oropharynx or uvular edema  Neck: Supple, full ROM, no stridor, no meningeal signs  Respiratory: Lungs clear to auscultation bilaterally, no wheezes, rales, or rhonchi. Not in respiratory distress  Cardiovascular:  Regular rate. Regular rhythm. No murmurs, no gallops, no rubs. 2+ distal pulses. Equal extremity pulses. Chest: No chest wall tenderness  GI:  Abdomen Soft, Non tender, Non distended. No rebound, guarding, or rigidity. No pulsatile masses. Musculoskeletal: Inability to move upper portion of right leg. Is able to wiggle toes. Warm and well perfused, no clubbing, no cyanosis, no edema. Capillary refill <3 seconds  Integument: skin warm and dry. No rashes. Neurologic: GCS 15, no focal deficits, strength 1/5 in right lower extremity  Psychiatric: Normal Affect            DIAGNOSTIC RESULTS   LABS:    Labs Reviewed   CBC WITH AUTO DIFFERENTIAL - Abnormal; Notable for the following components:       Result Value    Lymphocytes Absolute 1.12 (*)     All other components within normal limits   SPECIMEN REJECTION    Narrative:     CALL  Flores  [facility] tel. [phone],  Rejected Test Name/Called to:SHRADDHA/ Felicita Carter RN, 04/15/2023 14:41, by  [facility]   BASIC METABOLIC PANEL W/ REFLEX TO MG FOR LOW K   COMPREHENSIVE METABOLIC PANEL W/ REFLEX TO MG FOR LOW K   CBC WITH AUTO DIFFERENTIAL       As interpreted by me, the above displayed labs are abnormal. All other labs obtained during this visit were within normal range or not returned as of this dictation.     RADIOLOGY:   Non-plain film images such as CT, Ultrasound and MRI are read by the radiologist. Plain radiographic images are visualized and preliminarily interpreted by the ED Provider with the below findings:    X ray negative for fracture  CT pelvis demonstrated right superior pubic ramus

## 2023-04-20 ENCOUNTER — TELEPHONE (OUTPATIENT)
Dept: PRIMARY CARE CLINIC | Age: 83
End: 2023-04-20

## 2023-05-02 DIAGNOSIS — S32.810D MULTIPLE CLOSED FRACTURES OF PELVIS WITH STABLE DISRUPTION OF PELVIC RING WITH ROUTINE HEALING, SUBSEQUENT ENCOUNTER: ICD-10-CM

## 2023-05-02 RX ORDER — TRAMADOL HYDROCHLORIDE 50 MG/1
50 TABLET ORAL EVERY 12 HOURS PRN
Qty: 60 TABLET | Refills: 0 | Status: SHIPPED | OUTPATIENT
Start: 2023-05-02 | End: 2023-06-01

## 2023-06-06 ENCOUNTER — OFFICE VISIT (OUTPATIENT)
Dept: PRIMARY CARE CLINIC | Age: 83
End: 2023-06-06
Payer: MEDICARE

## 2023-06-06 VITALS
TEMPERATURE: 97.2 F | WEIGHT: 120 LBS | BODY MASS INDEX: 22.67 KG/M2 | DIASTOLIC BLOOD PRESSURE: 60 MMHG | SYSTOLIC BLOOD PRESSURE: 110 MMHG | OXYGEN SATURATION: 98 % | HEART RATE: 72 BPM

## 2023-06-06 DIAGNOSIS — F41.1 GAD (GENERALIZED ANXIETY DISORDER): Primary | ICD-10-CM

## 2023-06-06 DIAGNOSIS — G30.1 MODERATE LATE ONSET ALZHEIMER'S DEMENTIA WITH MOOD DISTURBANCE (HCC): ICD-10-CM

## 2023-06-06 DIAGNOSIS — H61.21 IMPACTED CERUMEN OF RIGHT EAR: ICD-10-CM

## 2023-06-06 DIAGNOSIS — F02.B3 MODERATE LATE ONSET ALZHEIMER'S DEMENTIA WITH MOOD DISTURBANCE (HCC): ICD-10-CM

## 2023-06-06 PROCEDURE — 3074F SYST BP LT 130 MM HG: CPT | Performed by: FAMILY MEDICINE

## 2023-06-06 PROCEDURE — 3078F DIAST BP <80 MM HG: CPT | Performed by: FAMILY MEDICINE

## 2023-06-06 PROCEDURE — 1123F ACP DISCUSS/DSCN MKR DOCD: CPT | Performed by: FAMILY MEDICINE

## 2023-06-06 PROCEDURE — 99213 OFFICE O/P EST LOW 20 MIN: CPT | Performed by: FAMILY MEDICINE

## 2023-06-06 RX ORDER — RISPERIDONE 0.5 MG/1
0.5 TABLET ORAL 2 TIMES DAILY
Qty: 60 TABLET | Refills: 3 | Status: SHIPPED | OUTPATIENT
Start: 2023-06-06

## 2023-06-06 RX ORDER — SERTRALINE HYDROCHLORIDE 100 MG/1
150 TABLET, FILM COATED ORAL NIGHTLY
Qty: 30 TABLET | Refills: 3 | Status: SHIPPED | OUTPATIENT
Start: 2023-06-06

## 2023-06-06 NOTE — PROGRESS NOTES
Priscilla Crenshaw, a female of 78 y.o. came to the office 6/6/2023. 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 The current episode started more than 1 month ago. The problem is controlled. Associated symptoms include anxiety. Anxiety  Presents for follow-up visit. Symptoms include depressed mood, irritability (with  and his health issues, gustavo if family or caregivers not there to help. he has Parkinson's and dementia.), nervous/anxious behavior and obsessions. Patient reports no excessive worry or insomnia. Symptoms occur most days. The severity of symptoms is moderate.      - Risperdal added 0.25 mg bid which has helped. Doesn't like people that come to house to help or stay all night.      Allergies   Allergen Reactions    Aspirin Swelling and Other (See Comments)     Lips swelling    Penicillins      Unsure of reaction    Bactrim [Sulfamethoxazole-Trimethoprim]      Had terrible shaking       Current Outpatient Medications on File Prior to Visit   Medication Sig Dispense Refill    meclizine (ANTIVERT) 25 MG tablet Meclizine (Motion

## 2023-07-13 ENCOUNTER — HOSPITAL ENCOUNTER (OUTPATIENT)
Dept: GENERAL RADIOLOGY | Age: 83
Discharge: HOME | End: 2023-07-15
Payer: MEDICARE

## 2023-07-13 ENCOUNTER — HOSPITAL ENCOUNTER (OUTPATIENT)
Dept: GENERAL RADIOLOGY | Age: 83
End: 2023-07-13
Payer: MEDICARE

## 2023-07-13 VITALS — HEIGHT: 61 IN | BODY MASS INDEX: 22.66 KG/M2 | WEIGHT: 120 LBS

## 2023-07-13 DIAGNOSIS — N63.10 MASS OF RIGHT BREAST, UNSPECIFIED QUADRANT: ICD-10-CM

## 2023-07-13 PROCEDURE — G0279 TOMOSYNTHESIS, MAMMO: HCPCS

## 2023-08-23 ENCOUNTER — TELEPHONE (OUTPATIENT)
Dept: PRIMARY CARE CLINIC | Age: 83
End: 2023-08-23

## 2023-08-23 DIAGNOSIS — E78.2 MIXED HYPERLIPIDEMIA: ICD-10-CM

## 2023-08-23 RX ORDER — ATORVASTATIN CALCIUM 10 MG/1
5 TABLET, FILM COATED ORAL DAILY
Qty: 45 TABLET | Refills: 1 | Status: SHIPPED | OUTPATIENT
Start: 2023-08-23

## 2023-08-29 ENCOUNTER — OFFICE VISIT (OUTPATIENT)
Dept: PRIMARY CARE CLINIC | Age: 83
End: 2023-08-29
Payer: MEDICARE

## 2023-08-29 VITALS
DIASTOLIC BLOOD PRESSURE: 62 MMHG | SYSTOLIC BLOOD PRESSURE: 132 MMHG | WEIGHT: 138 LBS | TEMPERATURE: 97.2 F | HEART RATE: 82 BPM | BODY MASS INDEX: 26.07 KG/M2 | OXYGEN SATURATION: 98 %

## 2023-08-29 DIAGNOSIS — M19.011 LOCALIZED OSTEOARTHRITIS OF BOTH SHOULDER REGIONS: ICD-10-CM

## 2023-08-29 DIAGNOSIS — M19.012 LOCALIZED OSTEOARTHRITIS OF BOTH SHOULDER REGIONS: ICD-10-CM

## 2023-08-29 DIAGNOSIS — G30.1 MODERATE LATE ONSET ALZHEIMER'S DEMENTIA WITH MOOD DISTURBANCE (HCC): ICD-10-CM

## 2023-08-29 DIAGNOSIS — F02.B3 MODERATE LATE ONSET ALZHEIMER'S DEMENTIA WITH MOOD DISTURBANCE (HCC): ICD-10-CM

## 2023-08-29 DIAGNOSIS — F41.1 GAD (GENERALIZED ANXIETY DISORDER): Primary | ICD-10-CM

## 2023-08-29 PROCEDURE — 1123F ACP DISCUSS/DSCN MKR DOCD: CPT | Performed by: FAMILY MEDICINE

## 2023-08-29 PROCEDURE — 99213 OFFICE O/P EST LOW 20 MIN: CPT | Performed by: FAMILY MEDICINE

## 2023-08-29 PROCEDURE — 3075F SYST BP GE 130 - 139MM HG: CPT | Performed by: FAMILY MEDICINE

## 2023-08-29 PROCEDURE — 3078F DIAST BP <80 MM HG: CPT | Performed by: FAMILY MEDICINE

## 2023-08-29 NOTE — PROGRESS NOTES
Altagracia Castaneda, a female of 78 y.o. came to the office 8/29/2023. 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 The current episode started 1 to 4 weeks ago. Associated symptoms include arthralgias (R shoulder pain. Magan Closs ) and weakness. Pertinent negatives include no headaches. She has tried eating for the symptoms. The treatment provided moderate relief. Shoulder Injury   The incident occurred at home. The right shoulder is affected. The incident occurred more than 1 week ago. The injury mechanism was a direct blow. Associated symptoms include muscle weakness. She has tried nothing for the symptoms. Anxiety  Presents for follow-up visit. Symptoms include dizziness and nervous/anxious behavior (occas). Patient reports no excessive worry.           Allergies   Allergen Reactions    Aspirin Swelling and Other (See Comments)     Lips swelling    Penicillins      Unsure of reaction    Bactrim [Sulfamethoxazole-Trimethoprim]      Had terrible shaking       Current Outpatient Medications on File Prior to Visit   Medication Sig Dispense Refill

## 2023-09-08 ENCOUNTER — HOSPITAL ENCOUNTER (INPATIENT)
Age: 83
LOS: 2 days | Discharge: HOME | DRG: 178 | End: 2023-09-10
Attending: STUDENT IN AN ORGANIZED HEALTH CARE EDUCATION/TRAINING PROGRAM | Admitting: INTERNAL MEDICINE
Payer: MEDICARE

## 2023-09-08 ENCOUNTER — APPOINTMENT (OUTPATIENT)
Dept: GENERAL RADIOLOGY | Age: 83
DRG: 178 | End: 2023-09-08
Payer: MEDICARE

## 2023-09-08 DIAGNOSIS — U07.1 COVID-19: ICD-10-CM

## 2023-09-08 DIAGNOSIS — R26.2 AMBULATORY DYSFUNCTION: ICD-10-CM

## 2023-09-08 DIAGNOSIS — N30.01 ACUTE CYSTITIS WITH HEMATURIA: ICD-10-CM

## 2023-09-08 DIAGNOSIS — R53.1 GENERALIZED WEAKNESS: Primary | ICD-10-CM

## 2023-09-08 LAB
ALBUMIN SERPL-MCNC: 4.5 G/DL (ref 3.5–5.2)
ALP SERPL-CCNC: 82 U/L (ref 35–104)
ALT SERPL-CCNC: 14 U/L (ref 0–32)
ANION GAP SERPL CALCULATED.3IONS-SCNC: 12 MMOL/L (ref 7–16)
AST SERPL-CCNC: 21 U/L (ref 0–31)
BASOPHILS # BLD: 0.03 K/UL (ref 0–0.2)
BASOPHILS NFR BLD: 0 % (ref 0–2)
BILIRUB SERPL-MCNC: 0.6 MG/DL (ref 0–1.2)
BILIRUB UR QL STRIP: NEGATIVE
BUN SERPL-MCNC: 18 MG/DL (ref 6–23)
CALCIUM SERPL-MCNC: 8.7 MG/DL (ref 8.6–10.2)
CHLORIDE SERPL-SCNC: 103 MMOL/L (ref 98–107)
CLARITY UR: CLEAR
CO2 SERPL-SCNC: 26 MMOL/L (ref 22–29)
COLOR UR: YELLOW
CREAT SERPL-MCNC: 0.8 MG/DL (ref 0.5–1)
EKG ATRIAL RATE: 70 BPM
EKG P AXIS: 47 DEGREES
EKG P-R INTERVAL: 148 MS
EKG Q-T INTERVAL: 386 MS
EKG QRS DURATION: 74 MS
EKG QTC CALCULATION (BAZETT): 416 MS
EKG R AXIS: -28 DEGREES
EKG T AXIS: 21 DEGREES
EKG VENTRICULAR RATE: 70 BPM
EOSINOPHIL # BLD: 0.04 K/UL (ref 0.05–0.5)
EOSINOPHILS RELATIVE PERCENT: 1 % (ref 0–6)
ERYTHROCYTE [DISTWIDTH] IN BLOOD BY AUTOMATED COUNT: 13.8 % (ref 11.5–15)
GFR SERPL CREATININE-BSD FRML MDRD: >60 ML/MIN/1.73M2
GLUCOSE SERPL-MCNC: 141 MG/DL (ref 74–99)
GLUCOSE UR STRIP-MCNC: NEGATIVE MG/DL
HCT VFR BLD AUTO: 41.2 % (ref 34–48)
HGB BLD-MCNC: 13.4 G/DL (ref 11.5–15.5)
HGB UR QL STRIP.AUTO: ABNORMAL
IMM GRANULOCYTES # BLD AUTO: 0.04 K/UL (ref 0–0.58)
IMM GRANULOCYTES NFR BLD: 1 % (ref 0–5)
KETONES UR STRIP-MCNC: NEGATIVE MG/DL
LACTATE BLDV-SCNC: 1.6 MMOL/L (ref 0.5–1.9)
LEUKOCYTE ESTERASE UR QL STRIP: ABNORMAL
LYMPHOCYTES NFR BLD: 0.55 K/UL (ref 1.5–4)
LYMPHOCYTES RELATIVE PERCENT: 7 % (ref 20–42)
MCH RBC QN AUTO: 28.3 PG (ref 26–35)
MCHC RBC AUTO-ENTMCNC: 32.5 G/DL (ref 32–34.5)
MCV RBC AUTO: 87.1 FL (ref 80–99.9)
MONOCYTES NFR BLD: 0.66 K/UL (ref 0.1–0.95)
MONOCYTES NFR BLD: 8 % (ref 2–12)
NEUTROPHILS NFR BLD: 83 % (ref 43–80)
NEUTS SEG NFR BLD: 6.52 K/UL (ref 1.8–7.3)
NITRITE UR QL STRIP: NEGATIVE
PH UR STRIP: 5.5 [PH] (ref 5–9)
PLATELET # BLD AUTO: 159 K/UL (ref 130–450)
PMV BLD AUTO: 9.1 FL (ref 7–12)
POTASSIUM SERPL-SCNC: 3.9 MMOL/L (ref 3.5–5)
PROT SERPL-MCNC: 7.1 G/DL (ref 6.4–8.3)
PROT UR STRIP-MCNC: NEGATIVE MG/DL
RBC # BLD AUTO: 4.73 M/UL (ref 3.5–5.5)
RBC # BLD: ABNORMAL 10*6/UL
RBC # BLD: ABNORMAL 10*6/UL
RBC #/AREA URNS HPF: ABNORMAL /HPF
SARS-COV-2 RDRP RESP QL NAA+PROBE: DETECTED
SODIUM SERPL-SCNC: 141 MMOL/L (ref 132–146)
SP GR UR STRIP: 1.01 (ref 1–1.03)
SPECIMEN DESCRIPTION: ABNORMAL
TROPONIN I SERPL HS-MCNC: 10 NG/L (ref 0–9)
TROPONIN I SERPL HS-MCNC: 12 NG/L (ref 0–9)
UROBILINOGEN UR STRIP-ACNC: 0.2 EU/DL (ref 0–1)
WBC #/AREA URNS HPF: ABNORMAL /HPF
WBC OTHER # BLD: 7.8 K/UL (ref 4.5–11.5)

## 2023-09-08 PROCEDURE — 1200000000 HC SEMI PRIVATE

## 2023-09-08 PROCEDURE — 80053 COMPREHEN METABOLIC PANEL: CPT

## 2023-09-08 PROCEDURE — 87088 URINE BACTERIA CULTURE: CPT

## 2023-09-08 PROCEDURE — 6360000002 HC RX W HCPCS: Performed by: INTERNAL MEDICINE

## 2023-09-08 PROCEDURE — 87086 URINE CULTURE/COLONY COUNT: CPT

## 2023-09-08 PROCEDURE — 2580000003 HC RX 258: Performed by: INTERNAL MEDICINE

## 2023-09-08 PROCEDURE — 85025 COMPLETE CBC W/AUTO DIFF WBC: CPT

## 2023-09-08 PROCEDURE — 93010 ELECTROCARDIOGRAM REPORT: CPT | Performed by: INTERNAL MEDICINE

## 2023-09-08 PROCEDURE — 99285 EMERGENCY DEPT VISIT HI MDM: CPT

## 2023-09-08 PROCEDURE — 2580000003 HC RX 258: Performed by: STUDENT IN AN ORGANIZED HEALTH CARE EDUCATION/TRAINING PROGRAM

## 2023-09-08 PROCEDURE — 96374 THER/PROPH/DIAG INJ IV PUSH: CPT

## 2023-09-08 PROCEDURE — 6370000000 HC RX 637 (ALT 250 FOR IP): Performed by: STUDENT IN AN ORGANIZED HEALTH CARE EDUCATION/TRAINING PROGRAM

## 2023-09-08 PROCEDURE — 87040 BLOOD CULTURE FOR BACTERIA: CPT

## 2023-09-08 PROCEDURE — 51701 INSERT BLADDER CATHETER: CPT

## 2023-09-08 PROCEDURE — 71045 X-RAY EXAM CHEST 1 VIEW: CPT

## 2023-09-08 PROCEDURE — 93005 ELECTROCARDIOGRAM TRACING: CPT | Performed by: STUDENT IN AN ORGANIZED HEALTH CARE EDUCATION/TRAINING PROGRAM

## 2023-09-08 PROCEDURE — 87635 SARS-COV-2 COVID-19 AMP PRB: CPT

## 2023-09-08 PROCEDURE — 83605 ASSAY OF LACTIC ACID: CPT

## 2023-09-08 PROCEDURE — 84484 ASSAY OF TROPONIN QUANT: CPT

## 2023-09-08 PROCEDURE — 6360000002 HC RX W HCPCS: Performed by: STUDENT IN AN ORGANIZED HEALTH CARE EDUCATION/TRAINING PROGRAM

## 2023-09-08 PROCEDURE — 81001 URINALYSIS AUTO W/SCOPE: CPT

## 2023-09-08 PROCEDURE — 6370000000 HC RX 637 (ALT 250 FOR IP): Performed by: INTERNAL MEDICINE

## 2023-09-08 RX ORDER — 0.9 % SODIUM CHLORIDE 0.9 %
1000 INTRAVENOUS SOLUTION INTRAVENOUS ONCE
Status: COMPLETED | OUTPATIENT
Start: 2023-09-08 | End: 2023-09-08

## 2023-09-08 RX ORDER — ATORVASTATIN CALCIUM 10 MG/1
5 TABLET, FILM COATED ORAL DAILY
Status: DISCONTINUED | OUTPATIENT
Start: 2023-09-08 | End: 2023-09-10 | Stop reason: HOSPADM

## 2023-09-08 RX ORDER — POLYETHYLENE GLYCOL 3350 17 G/17G
17 POWDER, FOR SOLUTION ORAL DAILY PRN
Status: DISCONTINUED | OUTPATIENT
Start: 2023-09-08 | End: 2023-09-10 | Stop reason: HOSPADM

## 2023-09-08 RX ORDER — ACETAMINOPHEN 325 MG/1
650 TABLET ORAL EVERY 6 HOURS PRN
Status: DISCONTINUED | OUTPATIENT
Start: 2023-09-08 | End: 2023-09-10 | Stop reason: HOSPADM

## 2023-09-08 RX ORDER — ACETAMINOPHEN 650 MG/1
650 SUPPOSITORY RECTAL EVERY 6 HOURS PRN
Status: DISCONTINUED | OUTPATIENT
Start: 2023-09-08 | End: 2023-09-10 | Stop reason: HOSPADM

## 2023-09-08 RX ORDER — FLUTICASONE PROPIONATE 50 MCG
1 SPRAY, SUSPENSION (ML) NASAL DAILY
Status: DISCONTINUED | OUTPATIENT
Start: 2023-09-08 | End: 2023-09-10 | Stop reason: HOSPADM

## 2023-09-08 RX ORDER — RISPERIDONE 0.5 MG/1
0.5 TABLET ORAL 2 TIMES DAILY
Status: DISCONTINUED | OUTPATIENT
Start: 2023-09-08 | End: 2023-09-10 | Stop reason: HOSPADM

## 2023-09-08 RX ORDER — ONDANSETRON 2 MG/ML
4 INJECTION INTRAMUSCULAR; INTRAVENOUS EVERY 6 HOURS PRN
Status: DISCONTINUED | OUTPATIENT
Start: 2023-09-08 | End: 2023-09-10 | Stop reason: HOSPADM

## 2023-09-08 RX ORDER — ENOXAPARIN SODIUM 100 MG/ML
40 INJECTION SUBCUTANEOUS DAILY
Status: DISCONTINUED | OUTPATIENT
Start: 2023-09-08 | End: 2023-09-10 | Stop reason: HOSPADM

## 2023-09-08 RX ORDER — ACETAMINOPHEN 500 MG
1000 TABLET ORAL ONCE
Status: COMPLETED | OUTPATIENT
Start: 2023-09-08 | End: 2023-09-08

## 2023-09-08 RX ORDER — ONDANSETRON 4 MG/1
4 TABLET, ORALLY DISINTEGRATING ORAL EVERY 8 HOURS PRN
Status: DISCONTINUED | OUTPATIENT
Start: 2023-09-08 | End: 2023-09-10 | Stop reason: HOSPADM

## 2023-09-08 RX ORDER — SODIUM CHLORIDE 9 MG/ML
INJECTION, SOLUTION INTRAVENOUS PRN
Status: DISCONTINUED | OUTPATIENT
Start: 2023-09-08 | End: 2023-09-10 | Stop reason: HOSPADM

## 2023-09-08 RX ORDER — SODIUM CHLORIDE 0.9 % (FLUSH) 0.9 %
5-40 SYRINGE (ML) INJECTION PRN
Status: DISCONTINUED | OUTPATIENT
Start: 2023-09-08 | End: 2023-09-10 | Stop reason: HOSPADM

## 2023-09-08 RX ORDER — SODIUM CHLORIDE 0.9 % (FLUSH) 0.9 %
5-40 SYRINGE (ML) INJECTION EVERY 12 HOURS SCHEDULED
Status: DISCONTINUED | OUTPATIENT
Start: 2023-09-08 | End: 2023-09-10 | Stop reason: HOSPADM

## 2023-09-08 RX ADMIN — SERTRALINE 150 MG: 100 TABLET, FILM COATED ORAL at 19:56

## 2023-09-08 RX ADMIN — SODIUM CHLORIDE 1000 ML: 9 INJECTION, SOLUTION INTRAVENOUS at 12:54

## 2023-09-08 RX ADMIN — FLUTICASONE PROPIONATE 1 SPRAY: 50 SPRAY, METERED NASAL at 20:37

## 2023-09-08 RX ADMIN — SODIUM CHLORIDE, PRESERVATIVE FREE 10 ML: 5 INJECTION INTRAVENOUS at 21:45

## 2023-09-08 RX ADMIN — WATER 1000 MG: 1 INJECTION INTRAMUSCULAR; INTRAVENOUS; SUBCUTANEOUS at 17:17

## 2023-09-08 RX ADMIN — ATORVASTATIN CALCIUM 5 MG: 10 TABLET, FILM COATED ORAL at 20:34

## 2023-09-08 RX ADMIN — ACETAMINOPHEN 650 MG: 325 TABLET ORAL at 19:56

## 2023-09-08 RX ADMIN — RISPERIDONE 0.5 MG: 0.5 TABLET ORAL at 20:33

## 2023-09-08 RX ADMIN — ENOXAPARIN SODIUM 40 MG: 100 INJECTION SUBCUTANEOUS at 20:34

## 2023-09-08 RX ADMIN — ACETAMINOPHEN 1000 MG: 500 TABLET ORAL at 12:48

## 2023-09-08 ASSESSMENT — PAIN - FUNCTIONAL ASSESSMENT
PAIN_FUNCTIONAL_ASSESSMENT: NONE - DENIES PAIN
PAIN_FUNCTIONAL_ASSESSMENT: NONE - DENIES PAIN

## 2023-09-08 NOTE — ED NOTES
Pt unable to ambulate. Pt stood at side of bed and verbalized feeling very dizzy.      Royer Ji  09/08/23 [id number]

## 2023-09-08 NOTE — H&P
[facility] History and Physical      CHIEF COMPLAINT: Generalized weakness and fatigue    History of Present Illness: Patient is a 56-year-old lady with past medical history of dementia, hyperlipidemia who was brought to ER by EMS due to generalized weakness for possible placement. Per daughter who is at bedside at baseline she can walk around at home with a walker but has not been able to do that today due to generalized weakness. Her daughter feels that she has gotten significantly weak and has needing help with everything at home, she lives at home with her  who has advanced Parkinson's. Lives at home with . Reported to have generalized body aches and dry cough, reports chills and rigors. Denies any shortness of breath. Informant(s) for H&P:chart review, patient's daughter at bedside and patient herself. REVIEW OF SYSTEMS:  A comprehensive review of systems was negative except for: what is in the HPI      PMH:  Past Medical History:   Diagnosis Date    Allergic rhinitis     Alopecia of scalp 09/03/2015    Anxiety 09/03/2014    Basal cell carcinoma of nose     left side    Breast CA ([address]) 2001    right lumpectomy; DO NOT USE FOR BP,IV,VENIPUNVTURES PER PT    Constipated     postop hip replacement, impacted stool    COVID-19 virus infection 09/08/2023    Episodic low back pain     MRI 3/2010 normal    Essential hypertension 09/03/2015    Hyperlipidemia     Hypertension     pt states well controlled    Neck pain     Osteoarthritis 10/08/2014    Right knee on x-ray - [facility]    Osteoarthritis of both hips     Osteopenia     Urinary, incontinence, stress female 08/27/2015    Follows with Dr Jeniffer Villagomez. Advised surgery or pessary.        Surgical History:  Past Surgical History:   Procedure Laterality Date    APPENDECTOMY      child    BREAST LUMPECTOMY  04/2000    COLONOSCOPY  2000    HYSTERECTOMY (CERVIX STATUS UNKNOWN)  1979    [facility]    JOINT REPLACEMENT Right 10/01/2012

## 2023-09-08 NOTE — ED NOTES
Attempted to ambulate patient, pt became very dizzy upon standing. Spo2 90% on room air upon standing and very shaky.       Radha Carlton RN  09/08/23 [id number]

## 2023-09-08 NOTE — ED PROVIDER NOTES
[address]        Pt Name: Buck Suarez  MRN: [id number]  [address] [facility] [place] 1940  Date of evaluation: 9/8/2023  Provider: Brook Flowers DO  PCP: Barbara Stark DO  Note Started: 12:16 PM EDT 9/8/23    CHIEF COMPLAINT       Chief Complaint   Patient presents with    Fatigue     Started yesterday, worse today, normally walks with walker, today unable to get out of bed. HISTORY OF PRESENT ILLNESS: 1 or more Elements   History From: patient    Limitations to history : None    Buck Suarez is a 78 y.o. female with a history of dementia who presents the emergency department via EMS from home for generalized weakness and fatigue. Patient states that her symptoms were sudden onset yesterday, has been persistent, moderate severity, nothing makes better or worse. She typically walks with a walker at home but was unable to get out of bed today due to increased weakness. She does live at home with her  and there are caregivers that come to the house to help them. However, the patient is unable to even get out of bed due to the increased weakness. She does complain of generalized body aches. States that she has had a dry nonproductive cough as well. She does have subjective fevers and chills. Denies any chest pain, shortness of breath, abdominal pain, nausea, vomiting, diarrhea, spontaneous pain out of weakness, recent hospitalization, recent was, or other acute symptoms or concerns. Nursing Notes were all reviewed and agreed with or any disagreements were addressed in the HPI. REVIEW OF SYSTEMS :      Review of Systems    Positives and Pertinent negatives as per HPI.      SURGICAL HISTORY     Past Surgical History:   Procedure Laterality Date    APPENDECTOMY      child    BREAST LUMPECTOMY  04/2000    COLONOSCOPY  2000    HYSTERECTOMY (CERVIX STATUS UNKNOWN)  1979    [facility]    JOINT

## 2023-09-09 LAB
ANION GAP SERPL CALCULATED.3IONS-SCNC: 10 MMOL/L (ref 7–16)
BASOPHILS # BLD: 0.02 K/UL (ref 0–0.2)
BASOPHILS NFR BLD: 0 % (ref 0–2)
BUN SERPL-MCNC: 17 MG/DL (ref 6–23)
CALCIUM SERPL-MCNC: 8.2 MG/DL (ref 8.6–10.2)
CHLORIDE SERPL-SCNC: 104 MMOL/L (ref 98–107)
CO2 SERPL-SCNC: 27 MMOL/L (ref 22–29)
CREAT SERPL-MCNC: 0.7 MG/DL (ref 0.5–1)
EOSINOPHIL # BLD: 0.05 K/UL (ref 0.05–0.5)
EOSINOPHILS RELATIVE PERCENT: 1 % (ref 0–6)
ERYTHROCYTE [DISTWIDTH] IN BLOOD BY AUTOMATED COUNT: 13.9 % (ref 11.5–15)
GFR SERPL CREATININE-BSD FRML MDRD: >60 ML/MIN/1.73M2
GLUCOSE SERPL-MCNC: 108 MG/DL (ref 74–99)
HCT VFR BLD AUTO: 38.5 % (ref 34–48)
HGB BLD-MCNC: 12.5 G/DL (ref 11.5–15.5)
IMM GRANULOCYTES # BLD AUTO: 0.04 K/UL (ref 0–0.58)
IMM GRANULOCYTES NFR BLD: 0 % (ref 0–5)
LYMPHOCYTES NFR BLD: 0.99 K/UL (ref 1.5–4)
LYMPHOCYTES RELATIVE PERCENT: 11 % (ref 20–42)
MAGNESIUM SERPL-MCNC: 1.9 MG/DL (ref 1.6–2.6)
MCH RBC QN AUTO: 28.8 PG (ref 26–35)
MCHC RBC AUTO-ENTMCNC: 32.5 G/DL (ref 32–34.5)
MCV RBC AUTO: 88.7 FL (ref 80–99.9)
MONOCYTES NFR BLD: 0.93 K/UL (ref 0.1–0.95)
MONOCYTES NFR BLD: 10 % (ref 2–12)
NEUTROPHILS NFR BLD: 78 % (ref 43–80)
NEUTS SEG NFR BLD: 7.13 K/UL (ref 1.8–7.3)
PLATELET # BLD AUTO: 165 K/UL (ref 130–450)
PMV BLD AUTO: 9.1 FL (ref 7–12)
POTASSIUM SERPL-SCNC: 3.3 MMOL/L (ref 3.5–5)
RBC # BLD AUTO: 4.34 M/UL (ref 3.5–5.5)
SODIUM SERPL-SCNC: 141 MMOL/L (ref 132–146)
WBC OTHER # BLD: 9.2 K/UL (ref 4.5–11.5)

## 2023-09-09 PROCEDURE — 6370000000 HC RX 637 (ALT 250 FOR IP): Performed by: INTERNAL MEDICINE

## 2023-09-09 PROCEDURE — 1200000000 HC SEMI PRIVATE

## 2023-09-09 PROCEDURE — 80048 BASIC METABOLIC PNL TOTAL CA: CPT

## 2023-09-09 PROCEDURE — 2580000003 HC RX 258: Performed by: INTERNAL MEDICINE

## 2023-09-09 PROCEDURE — 6360000002 HC RX W HCPCS: Performed by: INTERNAL MEDICINE

## 2023-09-09 PROCEDURE — 85025 COMPLETE CBC W/AUTO DIFF WBC: CPT

## 2023-09-09 PROCEDURE — 83735 ASSAY OF MAGNESIUM: CPT

## 2023-09-09 RX ORDER — POTASSIUM CHLORIDE 20 MEQ/1
40 TABLET, EXTENDED RELEASE ORAL ONCE
Status: COMPLETED | OUTPATIENT
Start: 2023-09-09 | End: 2023-09-09

## 2023-09-09 RX ADMIN — POTASSIUM CHLORIDE 40 MEQ: 1500 TABLET, EXTENDED RELEASE ORAL at 09:33

## 2023-09-09 RX ADMIN — SODIUM CHLORIDE, PRESERVATIVE FREE 10 ML: 5 INJECTION INTRAVENOUS at 22:21

## 2023-09-09 RX ADMIN — SODIUM CHLORIDE, PRESERVATIVE FREE 1000 MG: 5 INJECTION INTRAVENOUS at 19:27

## 2023-09-09 RX ADMIN — SODIUM CHLORIDE, PRESERVATIVE FREE 10 ML: 5 INJECTION INTRAVENOUS at 09:33

## 2023-09-09 RX ADMIN — ATORVASTATIN CALCIUM 5 MG: 10 TABLET, FILM COATED ORAL at 09:33

## 2023-09-09 RX ADMIN — FLUTICASONE PROPIONATE 1 SPRAY: 50 SPRAY, METERED NASAL at 09:33

## 2023-09-09 RX ADMIN — RISPERIDONE 0.5 MG: 0.5 TABLET ORAL at 22:21

## 2023-09-09 RX ADMIN — RISPERIDONE 0.5 MG: 0.5 TABLET ORAL at 09:33

## 2023-09-09 RX ADMIN — SERTRALINE 150 MG: 100 TABLET, FILM COATED ORAL at 22:21

## 2023-09-09 RX ADMIN — ENOXAPARIN SODIUM 40 MG: 100 INJECTION SUBCUTANEOUS at 09:33

## 2023-09-09 NOTE — PROGRESS NOTES
4 Eyes Skin Assessment     NAME:  Mandi Galvez  YOB: 1940  MEDICAL RECORD NUMBER:  [id number]    The patient is being assessed for  Admission    I agree that at least one RN has performed a thorough Head to Toe Skin Assessment on the patient. ALL assessment sites listed below have been assessed. Areas assessed by both nurses:    Head, Face, Ears, Shoulders, Back, Chest, Arms, Elbows, Hands, Sacrum. Buttock, Coccyx, Ischium, Legs. Feet and Heels, and Under Medical Devices         Does the Patient have a Wound?  No noted wound(s)       Yonas Prevention initiated by RN: No  Wound Care Orders initiated by RN: No    Pressure Injury (Stage 3,4, Unstageable, DTI, NWPT, and Complex wounds) if present, place Wound referral order by RN under : No    New Ostomies, if present place, Ostomy referral order under : No     Nurse 1 eSignature: Electronically signed by Archibald Blizzard, RN on 9/8/23 at 11:35 PM EDT    **SHARE this note so that the co-signing nurse can place an eSignature**    Nurse 2 eSignature: Electronically signed by Michael Berger RN on 8/8/74 at 12:34 AM EDT

## 2023-09-10 VITALS
RESPIRATION RATE: 18 BRPM | WEIGHT: 140.65 LBS | OXYGEN SATURATION: 98 % | TEMPERATURE: 98.6 F | DIASTOLIC BLOOD PRESSURE: 49 MMHG | SYSTOLIC BLOOD PRESSURE: 130 MMHG | HEART RATE: 79 BPM | HEIGHT: 61 IN | BODY MASS INDEX: 26.56 KG/M2

## 2023-09-10 LAB
ANION GAP SERPL CALCULATED.3IONS-SCNC: 11 MMOL/L (ref 7–16)
B PARAP IS1001 DNA NPH QL NAA+NON-PROBE: NOT DETECTED
B PERT DNA SPEC QL NAA+PROBE: NOT DETECTED
BUN SERPL-MCNC: 13 MG/DL (ref 6–23)
C PNEUM DNA NPH QL NAA+NON-PROBE: NOT DETECTED
CALCIUM SERPL-MCNC: 8.6 MG/DL (ref 8.6–10.2)
CHLORIDE SERPL-SCNC: 105 MMOL/L (ref 98–107)
CO2 SERPL-SCNC: 25 MMOL/L (ref 22–29)
CREAT SERPL-MCNC: 0.6 MG/DL (ref 0.5–1)
FLUAV RNA NPH QL NAA+NON-PROBE: NOT DETECTED
FLUBV RNA NPH QL NAA+NON-PROBE: NOT DETECTED
GFR SERPL CREATININE-BSD FRML MDRD: >60 ML/MIN/1.73M2
GLUCOSE SERPL-MCNC: 101 MG/DL (ref 74–99)
HADV DNA NPH QL NAA+NON-PROBE: NOT DETECTED
HCOV 229E RNA NPH QL NAA+NON-PROBE: NOT DETECTED
HCOV HKU1 RNA NPH QL NAA+NON-PROBE: NOT DETECTED
HCOV NL63 RNA NPH QL NAA+NON-PROBE: NOT DETECTED
HCOV OC43 RNA NPH QL NAA+NON-PROBE: NOT DETECTED
HMPV RNA NPH QL NAA+NON-PROBE: NOT DETECTED
HPIV1 RNA NPH QL NAA+NON-PROBE: NOT DETECTED
HPIV2 RNA NPH QL NAA+NON-PROBE: NOT DETECTED
HPIV3 RNA NPH QL NAA+NON-PROBE: NOT DETECTED
HPIV4 RNA NPH QL NAA+NON-PROBE: NOT DETECTED
M PNEUMO DNA NPH QL NAA+NON-PROBE: NOT DETECTED
MICROORGANISM SPEC CULT: ABNORMAL
POTASSIUM SERPL-SCNC: 4 MMOL/L (ref 3.5–5)
RSV RNA NPH QL NAA+NON-PROBE: NOT DETECTED
RV+EV RNA NPH QL NAA+NON-PROBE: NOT DETECTED
SARS-COV-2 RNA NPH QL NAA+NON-PROBE: DETECTED
SODIUM SERPL-SCNC: 141 MMOL/L (ref 132–146)
SPECIMEN DESCRIPTION: ABNORMAL
SPECIMEN DESCRIPTION: ABNORMAL

## 2023-09-10 PROCEDURE — 0202U NFCT DS 22 TRGT SARS-COV-2: CPT

## 2023-09-10 PROCEDURE — 80048 BASIC METABOLIC PNL TOTAL CA: CPT

## 2023-09-10 PROCEDURE — 6370000000 HC RX 637 (ALT 250 FOR IP): Performed by: INTERNAL MEDICINE

## 2023-09-10 PROCEDURE — 6360000002 HC RX W HCPCS: Performed by: INTERNAL MEDICINE

## 2023-09-10 PROCEDURE — 2580000003 HC RX 258: Performed by: INTERNAL MEDICINE

## 2023-09-10 RX ORDER — CEPHALEXIN 500 MG/1
500 CAPSULE ORAL 2 TIMES DAILY
Qty: 4 CAPSULE | Refills: 0 | Status: SHIPPED | OUTPATIENT
Start: 2023-09-10 | End: 2023-09-12

## 2023-09-10 RX ORDER — FLUTICASONE PROPIONATE 50 MCG
1 SPRAY, SUSPENSION (ML) NASAL DAILY
Qty: 16 G | Refills: 3 | Status: ON HOLD | OUTPATIENT
Start: 2023-09-11

## 2023-09-10 RX ADMIN — SODIUM CHLORIDE, PRESERVATIVE FREE 10 ML: 5 INJECTION INTRAVENOUS at 08:40

## 2023-09-10 RX ADMIN — ENOXAPARIN SODIUM 40 MG: 100 INJECTION SUBCUTANEOUS at 08:39

## 2023-09-10 RX ADMIN — FLUTICASONE PROPIONATE 1 SPRAY: 50 SPRAY, METERED NASAL at 08:39

## 2023-09-10 RX ADMIN — ATORVASTATIN CALCIUM 5 MG: 10 TABLET, FILM COATED ORAL at 08:39

## 2023-09-10 RX ADMIN — RISPERIDONE 0.5 MG: 0.5 TABLET ORAL at 08:39

## 2023-09-10 NOTE — PROGRESS NOTES
Discharge information given to and reviewed with patient and patient daughter Bijal Weaver, denies any questions or concerns at this time.

## 2023-09-10 NOTE — DISCHARGE SUMMARY
use as pain level decreases     MULTIVITAMIN ADULT PO     risperiDONE 0.5 MG tablet  Commonly known as: RisperDAL  Take 1 tablet by mouth 2 times daily     sertraline 100 MG tablet  Commonly known as: ZOLOFT  Take 1.5 tablets by mouth nightly            STOP taking these medications      atorvastatin 10 MG tablet  Commonly known as: LIPITOR     ibuprofen 600 MG tablet  Commonly known as: ADVIL;MOTRIN     meclizine 25 MG tablet  Commonly known as: ANTIVERT               Where to Get Your Medications        These medications were sent to [address] [place] [address], [address] [phone] - F [phone]  [address], [address] [address]      Phone: [phone]   cephALEXin 500 MG capsule  fluticasone 50 MCG/ACT nasal spray           Note that more than 30 minutes was spent in preparing discharge papers, discussing discharge with patient, medication review, etc.    Signed:  Electronically signed by Esvin Rizvi MD on 9/10/2023 at 12:22 PM

## 2023-09-13 LAB
MICROORGANISM SPEC CULT: NORMAL
MICROORGANISM SPEC CULT: NORMAL
SERVICE CMNT-IMP: NORMAL
SERVICE CMNT-IMP: NORMAL
SPECIMEN DESCRIPTION: NORMAL
SPECIMEN DESCRIPTION: NORMAL

## 2023-09-15 ENCOUNTER — HOSPITAL ENCOUNTER (INPATIENT)
Age: 83
LOS: 2 days | Discharge: HOME | DRG: 092 | End: 2023-09-18
Attending: STUDENT IN AN ORGANIZED HEALTH CARE EDUCATION/TRAINING PROGRAM | Admitting: INTERNAL MEDICINE
Payer: MEDICARE

## 2023-09-15 ENCOUNTER — APPOINTMENT (OUTPATIENT)
Dept: GENERAL RADIOLOGY | Age: 83
DRG: 092 | End: 2023-09-15
Payer: MEDICARE

## 2023-09-15 ENCOUNTER — APPOINTMENT (OUTPATIENT)
Dept: CT IMAGING | Age: 83
DRG: 092 | End: 2023-09-15
Payer: MEDICARE

## 2023-09-15 DIAGNOSIS — R53.83 FATIGUE, UNSPECIFIED TYPE: Primary | ICD-10-CM

## 2023-09-15 DIAGNOSIS — R11.2 NAUSEA AND VOMITING, UNSPECIFIED VOMITING TYPE: ICD-10-CM

## 2023-09-15 DIAGNOSIS — R62.7 FTT (FAILURE TO THRIVE) IN ADULT: ICD-10-CM

## 2023-09-15 DIAGNOSIS — K52.9 COLITIS: ICD-10-CM

## 2023-09-15 DIAGNOSIS — R53.1 GENERALIZED WEAKNESS: ICD-10-CM

## 2023-09-15 PROCEDURE — 2580000003 HC RX 258

## 2023-09-15 PROCEDURE — 84484 ASSAY OF TROPONIN QUANT: CPT

## 2023-09-15 PROCEDURE — 70450 CT HEAD/BRAIN W/O DYE: CPT

## 2023-09-15 PROCEDURE — 99285 EMERGENCY DEPT VISIT HI MDM: CPT

## 2023-09-15 PROCEDURE — 93005 ELECTROCARDIOGRAM TRACING: CPT

## 2023-09-15 PROCEDURE — 71045 X-RAY EXAM CHEST 1 VIEW: CPT

## 2023-09-15 PROCEDURE — 85025 COMPLETE CBC W/AUTO DIFF WBC: CPT

## 2023-09-15 PROCEDURE — 83605 ASSAY OF LACTIC ACID: CPT

## 2023-09-15 PROCEDURE — 80053 COMPREHEN METABOLIC PANEL: CPT

## 2023-09-15 RX ORDER — 0.9 % SODIUM CHLORIDE 0.9 %
1000 INTRAVENOUS SOLUTION INTRAVENOUS ONCE
Status: COMPLETED | OUTPATIENT
Start: 2023-09-15 | End: 2023-09-16

## 2023-09-15 RX ADMIN — SODIUM CHLORIDE 1000 ML: 9 INJECTION, SOLUTION INTRAVENOUS at 23:40

## 2023-09-15 ASSESSMENT — PAIN - FUNCTIONAL ASSESSMENT
PAIN_FUNCTIONAL_ASSESSMENT: NONE - DENIES PAIN
PAIN_FUNCTIONAL_ASSESSMENT: NONE - DENIES PAIN

## 2023-09-16 ENCOUNTER — APPOINTMENT (OUTPATIENT)
Dept: CT IMAGING | Age: 83
DRG: 092 | End: 2023-09-16
Payer: MEDICARE

## 2023-09-16 PROBLEM — R41.82 ALTERED MENTAL STATUS: Status: ACTIVE | Noted: 2023-09-16

## 2023-09-16 PROBLEM — R53.83 FATIGUE: Status: ACTIVE | Noted: 2023-09-16

## 2023-09-16 PROBLEM — K52.9 COLITIS: Status: ACTIVE | Noted: 2023-09-16

## 2023-09-16 LAB
ALBUMIN SERPL-MCNC: 3.9 G/DL (ref 3.5–5.2)
ALP SERPL-CCNC: 75 U/L (ref 35–104)
ALT SERPL-CCNC: 12 U/L (ref 0–32)
AMMONIA PLAS-SCNC: 20 UMOL/L (ref 11–51)
ANION GAP SERPL CALCULATED.3IONS-SCNC: 13 MMOL/L (ref 7–16)
AST SERPL-CCNC: 19 U/L (ref 0–31)
BACTERIA URNS QL MICRO: ABNORMAL
BASOPHILS # BLD: 0.01 K/UL (ref 0–0.2)
BASOPHILS NFR BLD: 0 % (ref 0–2)
BILIRUB SERPL-MCNC: 0.4 MG/DL (ref 0–1.2)
BILIRUB UR QL STRIP: NEGATIVE
BUN SERPL-MCNC: 19 MG/DL (ref 6–23)
CALCIUM SERPL-MCNC: 8.2 MG/DL (ref 8.6–10.2)
CHLORIDE SERPL-SCNC: 99 MMOL/L (ref 98–107)
CLARITY UR: CLEAR
CO2 SERPL-SCNC: 24 MMOL/L (ref 22–29)
COLOR UR: YELLOW
CREAT SERPL-MCNC: 0.7 MG/DL (ref 0.5–1)
EKG ATRIAL RATE: 92 BPM
EKG P AXIS: 143 DEGREES
EKG P-R INTERVAL: 142 MS
EKG Q-T INTERVAL: 366 MS
EKG QRS DURATION: 72 MS
EKG QTC CALCULATION (BAZETT): 452 MS
EKG R AXIS: 2 DEGREES
EKG T AXIS: 103 DEGREES
EKG VENTRICULAR RATE: 92 BPM
EOSINOPHIL # BLD: 0 K/UL (ref 0.05–0.5)
EOSINOPHILS RELATIVE PERCENT: 0 % (ref 0–6)
EPI CELLS #/AREA URNS HPF: ABNORMAL /HPF
ERYTHROCYTE [DISTWIDTH] IN BLOOD BY AUTOMATED COUNT: 13.6 % (ref 11.5–15)
FOLATE SERPL-MCNC: >20 NG/ML (ref 4.8–24.2)
GFR SERPL CREATININE-BSD FRML MDRD: >60 ML/MIN/1.73M2
GLUCOSE SERPL-MCNC: 175 MG/DL (ref 74–99)
GLUCOSE UR STRIP-MCNC: NEGATIVE MG/DL
HCT VFR BLD AUTO: 35.6 % (ref 34–48)
HGB BLD-MCNC: 11.9 G/DL (ref 11.5–15.5)
HGB UR QL STRIP.AUTO: ABNORMAL
IMM GRANULOCYTES # BLD AUTO: 0.12 K/UL (ref 0–0.58)
IMM GRANULOCYTES NFR BLD: 1 % (ref 0–5)
INR PPP: 1.1
KETONES UR STRIP-MCNC: NEGATIVE MG/DL
LACTATE BLDV-SCNC: 1.1 MMOL/L (ref 0.5–1.9)
LACTATE BLDV-SCNC: 1.1 MMOL/L (ref 0.5–2.2)
LEUKOCYTE ESTERASE UR QL STRIP: ABNORMAL
LIPASE SERPL-CCNC: 15 U/L (ref 13–60)
LYMPHOCYTES NFR BLD: 0.2 K/UL (ref 1.5–4)
LYMPHOCYTES RELATIVE PERCENT: 2 % (ref 20–42)
MCH RBC QN AUTO: 28.7 PG (ref 26–35)
MCHC RBC AUTO-ENTMCNC: 33.4 G/DL (ref 32–34.5)
MCV RBC AUTO: 85.8 FL (ref 80–99.9)
MONOCYTES NFR BLD: 0.56 K/UL (ref 0.1–0.95)
MONOCYTES NFR BLD: 5 % (ref 2–12)
NEUTROPHILS NFR BLD: 92 % (ref 43–80)
NEUTS SEG NFR BLD: 10.48 K/UL (ref 1.8–7.3)
NITRITE UR QL STRIP: NEGATIVE
PARTIAL THROMBOPLASTIN TIME: 28.5 SEC (ref 24.5–35.1)
PH UR STRIP: 6 [PH] (ref 5–9)
PLATELET # BLD AUTO: 209 K/UL (ref 130–450)
PMV BLD AUTO: 8.6 FL (ref 7–12)
POTASSIUM SERPL-SCNC: 3.7 MMOL/L (ref 3.5–5)
PROT SERPL-MCNC: 6.6 G/DL (ref 6.4–8.3)
PROT UR STRIP-MCNC: NEGATIVE MG/DL
PROTHROMBIN TIME: 12.4 SEC (ref 9.3–12.4)
RBC # BLD AUTO: 4.15 M/UL (ref 3.5–5.5)
RBC # BLD: ABNORMAL 10*6/UL
RBC #/AREA URNS HPF: ABNORMAL /HPF
SODIUM SERPL-SCNC: 136 MMOL/L (ref 132–146)
SP GR UR STRIP: >1.03 (ref 1–1.03)
TROPONIN I SERPL HS-MCNC: 10 NG/L (ref 0–9)
TROPONIN I SERPL HS-MCNC: 12 NG/L (ref 0–9)
UROBILINOGEN UR STRIP-ACNC: 0.2 EU/DL (ref 0–1)
VIT B12 SERPL-MCNC: 555 PG/ML (ref 211–946)
WBC #/AREA URNS HPF: ABNORMAL /HPF
WBC OTHER # BLD: 11.4 K/UL (ref 4.5–11.5)

## 2023-09-16 PROCEDURE — 93010 ELECTROCARDIOGRAM REPORT: CPT | Performed by: INTERNAL MEDICINE

## 2023-09-16 PROCEDURE — 87449 NOS EACH ORGANISM AG IA: CPT

## 2023-09-16 PROCEDURE — 87088 URINE BACTERIA CULTURE: CPT

## 2023-09-16 PROCEDURE — 6370000000 HC RX 637 (ALT 250 FOR IP): Performed by: STUDENT IN AN ORGANIZED HEALTH CARE EDUCATION/TRAINING PROGRAM

## 2023-09-16 PROCEDURE — 6370000000 HC RX 637 (ALT 250 FOR IP): Performed by: INTERNAL MEDICINE

## 2023-09-16 PROCEDURE — 97535 SELF CARE MNGMENT TRAINING: CPT

## 2023-09-16 PROCEDURE — 83605 ASSAY OF LACTIC ACID: CPT

## 2023-09-16 PROCEDURE — 87046 STOOL CULTR AEROBIC BACT EA: CPT

## 2023-09-16 PROCEDURE — 82140 ASSAY OF AMMONIA: CPT

## 2023-09-16 PROCEDURE — 87045 FECES CULTURE AEROBIC BACT: CPT

## 2023-09-16 PROCEDURE — 83690 ASSAY OF LIPASE: CPT

## 2023-09-16 PROCEDURE — 2060000000 HC ICU INTERMEDIATE R&B

## 2023-09-16 PROCEDURE — 6360000002 HC RX W HCPCS: Performed by: INTERNAL MEDICINE

## 2023-09-16 PROCEDURE — 85610 PROTHROMBIN TIME: CPT

## 2023-09-16 PROCEDURE — 81001 URINALYSIS AUTO W/SCOPE: CPT

## 2023-09-16 PROCEDURE — 99221 1ST HOSP IP/OBS SF/LOW 40: CPT | Performed by: NURSE PRACTITIONER

## 2023-09-16 PROCEDURE — 82607 VITAMIN B-12: CPT

## 2023-09-16 PROCEDURE — 97530 THERAPEUTIC ACTIVITIES: CPT

## 2023-09-16 PROCEDURE — 6360000002 HC RX W HCPCS: Performed by: STUDENT IN AN ORGANIZED HEALTH CARE EDUCATION/TRAINING PROGRAM

## 2023-09-16 PROCEDURE — 6360000002 HC RX W HCPCS

## 2023-09-16 PROCEDURE — 74177 CT ABD & PELVIS W/CONTRAST: CPT

## 2023-09-16 PROCEDURE — 96374 THER/PROPH/DIAG INJ IV PUSH: CPT

## 2023-09-16 PROCEDURE — 87040 BLOOD CULTURE FOR BACTERIA: CPT

## 2023-09-16 PROCEDURE — 97165 OT EVAL LOW COMPLEX 30 MIN: CPT

## 2023-09-16 PROCEDURE — 99223 1ST HOSP IP/OBS HIGH 75: CPT | Performed by: INTERNAL MEDICINE

## 2023-09-16 PROCEDURE — 84484 ASSAY OF TROPONIN QUANT: CPT

## 2023-09-16 PROCEDURE — 2580000003 HC RX 258: Performed by: INTERNAL MEDICINE

## 2023-09-16 PROCEDURE — 87086 URINE CULTURE/COLONY COUNT: CPT

## 2023-09-16 PROCEDURE — 85730 THROMBOPLASTIN TIME PARTIAL: CPT

## 2023-09-16 PROCEDURE — 6360000004 HC RX CONTRAST MEDICATION: Performed by: RADIOLOGY

## 2023-09-16 PROCEDURE — 71260 CT THORAX DX C+: CPT

## 2023-09-16 PROCEDURE — 82746 ASSAY OF FOLIC ACID SERUM: CPT

## 2023-09-16 PROCEDURE — 87324 CLOSTRIDIUM AG IA: CPT

## 2023-09-16 PROCEDURE — 2580000003 HC RX 258

## 2023-09-16 PROCEDURE — 87427 SHIGA-LIKE TOXIN AG IA: CPT

## 2023-09-16 PROCEDURE — 87077 CULTURE AEROBIC IDENTIFY: CPT

## 2023-09-16 RX ORDER — FLUTICASONE PROPIONATE 50 MCG
1 SPRAY, SUSPENSION (ML) NASAL DAILY
Status: DISCONTINUED | OUTPATIENT
Start: 2023-09-16 | End: 2023-09-16

## 2023-09-16 RX ORDER — ONDANSETRON 2 MG/ML
4 INJECTION INTRAMUSCULAR; INTRAVENOUS ONCE
Status: COMPLETED | OUTPATIENT
Start: 2023-09-16 | End: 2023-09-16

## 2023-09-16 RX ORDER — ACETAMINOPHEN 325 MG/1
650 TABLET ORAL EVERY 6 HOURS PRN
Status: DISCONTINUED | OUTPATIENT
Start: 2023-09-16 | End: 2023-09-18 | Stop reason: HOSPADM

## 2023-09-16 RX ORDER — RISPERIDONE 0.5 MG/1
0.5 TABLET ORAL NIGHTLY
Status: DISCONTINUED | OUTPATIENT
Start: 2023-09-16 | End: 2023-09-18 | Stop reason: HOSPADM

## 2023-09-16 RX ORDER — SODIUM CHLORIDE 9 MG/ML
INJECTION, SOLUTION INTRAVENOUS CONTINUOUS
Status: DISCONTINUED | OUTPATIENT
Start: 2023-09-16 | End: 2023-09-18 | Stop reason: HOSPADM

## 2023-09-16 RX ORDER — ENOXAPARIN SODIUM 100 MG/ML
40 INJECTION SUBCUTANEOUS DAILY
Status: DISCONTINUED | OUTPATIENT
Start: 2023-09-16 | End: 2023-09-18 | Stop reason: HOSPADM

## 2023-09-16 RX ORDER — MULTIVITAMIN WITH IRON
1 TABLET ORAL DAILY
Status: DISCONTINUED | OUTPATIENT
Start: 2023-09-16 | End: 2023-09-18 | Stop reason: HOSPADM

## 2023-09-16 RX ORDER — FLUTICASONE PROPIONATE 50 MCG
2 SPRAY, SUSPENSION (ML) NASAL DAILY
Status: DISCONTINUED | OUTPATIENT
Start: 2023-09-17 | End: 2023-09-18 | Stop reason: HOSPADM

## 2023-09-16 RX ORDER — POLYETHYLENE GLYCOL 3350 17 G/17G
17 POWDER, FOR SOLUTION ORAL DAILY PRN
Status: DISCONTINUED | OUTPATIENT
Start: 2023-09-16 | End: 2023-09-18 | Stop reason: HOSPADM

## 2023-09-16 RX ORDER — SODIUM CHLORIDE 9 MG/ML
INJECTION, SOLUTION INTRAVENOUS PRN
Status: DISCONTINUED | OUTPATIENT
Start: 2023-09-16 | End: 2023-09-18 | Stop reason: HOSPADM

## 2023-09-16 RX ORDER — ACETAMINOPHEN 650 MG/1
650 SUPPOSITORY RECTAL EVERY 6 HOURS PRN
Status: DISCONTINUED | OUTPATIENT
Start: 2023-09-16 | End: 2023-09-18 | Stop reason: HOSPADM

## 2023-09-16 RX ORDER — SODIUM CHLORIDE 0.9 % (FLUSH) 0.9 %
5-40 SYRINGE (ML) INJECTION EVERY 12 HOURS SCHEDULED
Status: DISCONTINUED | OUTPATIENT
Start: 2023-09-16 | End: 2023-09-18 | Stop reason: HOSPADM

## 2023-09-16 RX ORDER — SODIUM CHLORIDE 0.9 % (FLUSH) 0.9 %
5-40 SYRINGE (ML) INJECTION PRN
Status: DISCONTINUED | OUTPATIENT
Start: 2023-09-16 | End: 2023-09-18 | Stop reason: HOSPADM

## 2023-09-16 RX ORDER — ONDANSETRON 4 MG/1
4 TABLET, ORALLY DISINTEGRATING ORAL EVERY 8 HOURS PRN
Status: DISCONTINUED | OUTPATIENT
Start: 2023-09-16 | End: 2023-09-18 | Stop reason: HOSPADM

## 2023-09-16 RX ORDER — METRONIDAZOLE 500 MG/100ML
500 INJECTION, SOLUTION INTRAVENOUS ONCE
Status: COMPLETED | OUTPATIENT
Start: 2023-09-16 | End: 2023-09-16

## 2023-09-16 RX ORDER — ONDANSETRON 2 MG/ML
4 INJECTION INTRAMUSCULAR; INTRAVENOUS EVERY 6 HOURS PRN
Status: DISCONTINUED | OUTPATIENT
Start: 2023-09-16 | End: 2023-09-18 | Stop reason: HOSPADM

## 2023-09-16 RX ORDER — ACETAMINOPHEN 500 MG
1000 TABLET ORAL ONCE
Status: COMPLETED | OUTPATIENT
Start: 2023-09-16 | End: 2023-09-16

## 2023-09-16 RX ADMIN — ACETAMINOPHEN 650 MG: 325 TABLET ORAL at 13:14

## 2023-09-16 RX ADMIN — ACETAMINOPHEN 650 MG: 325 TABLET ORAL at 17:33

## 2023-09-16 RX ADMIN — MULTIVITAMIN TABLET 1 TABLET: TABLET at 13:08

## 2023-09-16 RX ADMIN — IOPAMIDOL 75 ML: 755 INJECTION, SOLUTION INTRAVENOUS at 03:30

## 2023-09-16 RX ADMIN — SERTRALINE 150 MG: 100 TABLET, FILM COATED ORAL at 21:42

## 2023-09-16 RX ADMIN — ACETAMINOPHEN 1000 MG: 500 TABLET ORAL at 01:40

## 2023-09-16 RX ADMIN — ONDANSETRON 4 MG: 2 INJECTION INTRAMUSCULAR; INTRAVENOUS at 01:52

## 2023-09-16 RX ADMIN — ENOXAPARIN SODIUM 40 MG: 100 INJECTION SUBCUTANEOUS at 13:09

## 2023-09-16 RX ADMIN — SODIUM CHLORIDE, PRESERVATIVE FREE 10 ML: 5 INJECTION INTRAVENOUS at 21:43

## 2023-09-16 RX ADMIN — SODIUM CHLORIDE, PRESERVATIVE FREE 10 ML: 5 INJECTION INTRAVENOUS at 13:08

## 2023-09-16 RX ADMIN — ANORECTAL OINTMENT: 15.7; .44; 24; 20.6 OINTMENT TOPICAL at 17:33

## 2023-09-16 RX ADMIN — WATER 1000 MG: 1 INJECTION INTRAMUSCULAR; INTRAVENOUS; SUBCUTANEOUS at 06:30

## 2023-09-16 RX ADMIN — METRONIDAZOLE 500 MG: 500 INJECTION, SOLUTION INTRAVENOUS at 06:31

## 2023-09-16 RX ADMIN — SALINE NASAL SPRAY 1 SPRAY: 1.5 SOLUTION NASAL at 21:42

## 2023-09-16 RX ADMIN — SODIUM CHLORIDE: 9 INJECTION, SOLUTION INTRAVENOUS at 13:55

## 2023-09-16 RX ADMIN — FLUTICASONE PROPIONATE 1 SPRAY: 50 SPRAY, METERED NASAL at 13:09

## 2023-09-16 RX ADMIN — RISPERIDONE 0.5 MG: 0.5 TABLET ORAL at 21:42

## 2023-09-16 ASSESSMENT — PAIN SCALES - GENERAL
PAINLEVEL_OUTOF10: 1
PAINLEVEL_OUTOF10: 0
PAINLEVEL_OUTOF10: 8

## 2023-09-16 ASSESSMENT — PAIN DESCRIPTION - LOCATION
LOCATION: BACK
LOCATION: GROIN

## 2023-09-16 NOTE — PROGRESS NOTES
OCCUPATIONAL THERAPY INITIAL EVALUATION    [facility] [facility]         RPXC:                                                  Patient Name: Mandi Galvez    MRN: [id number]    : 1940    Room: [address] [place] [address]      Evaluating OT: Alba Goodrich, OTR/L [id number]      Referring Provider:JONNATHAN Massey NP    Specific Provider Orders/Date:2023 OT eval and treat       Diagnosis:  Colitis    Surgery:   Past Surgical History:   Procedure Laterality Date    APPENDECTOMY      child    BREAST LUMPECTOMY  2000    COLONOSCOPY      HYSTERECTOMY (CERVIX STATUS UNKNOWN)  1979    [facility]    JOINT REPLACEMENT Right 10/01/2012     Right Hip - [facility]    OTHER SURGICAL HISTORY Left 10/18/2013    full thickness skin graft, repair open nose defect - Dr. Yogesh Gomes / Dr. Jefe Fuentes History: 2023 Covid+  Past Medical History:   Diagnosis Date    Allergic rhinitis     Alopecia of scalp 2015    Anxiety 2014    Basal cell carcinoma of nose     left side    Breast CA ([address]) 2001    right lumpectomy; DO NOT USE FOR BP,IV,VENIPUNVTURES PER PT    Constipated     postop hip replacement, impacted stool    COVID-19 virus infection 2023    Episodic low back pain     MRI 3/2010 normal    Essential hypertension 2015    Hyperlipidemia     Hypertension     pt states well controlled    Neck pain     Osteoarthritis 10/08/2014    Right knee on x-ray - [facility]    Osteoarthritis of both hips     Osteopenia     Urinary, incontinence, stress female 2015    Follows with Dr Ochoa Bloom. Advised surgery or pessary.           Past Medical History:   Diagnosis Date    Allergic rhinitis     Alopecia of scalp 2015    Anxiety 2014    Basal cell carcinoma of nose     left side    Breast CA ([address]) 2001    right lumpectomy; DO NOT USE FOR BP,IV,VENIPUNVTURES PER PT    Constipated     postop hip

## 2023-09-16 NOTE — PLAN OF CARE
Problem: Discharge Planning  Goal: Discharge to home or other facility with appropriate resources  Outcome: Progressing  Flowsheets (Taken 9/16/2023 0732)  Discharge to home or other facility with appropriate resources:   Identify barriers to discharge with patient and caregiver   Arrange for needed discharge resources and transportation as appropriate   Identify discharge learning needs (meds, wound care, etc)     Problem: Pain  Goal: Verbalizes/displays adequate comfort level or baseline comfort level  Outcome: Progressing     Problem: Safety - Adult  Goal: Free from fall injury  Outcome: Progressing     Problem: ABCDS Injury Assessment  Goal: Absence of physical injury  Outcome: Progressing  Flowsheets (Taken 9/16/2023 0728)  Absence of Physical Injury: Implement safety measures based on patient assessment     Problem: Musculoskeletal - Adult  Goal: Maintain proper alignment of affected body part  Outcome: Progressing     Problem: Gastrointestinal - Adult  Goal: Maintains or returns to baseline bowel function  Outcome: Progressing     Problem: Infection - Adult  Goal: Absence of infection at discharge  Outcome: Progressing

## 2023-09-16 NOTE — CARE COORDINATION
Social work / Discharge Planning:        Social work met with patient's daughter for initial assessment. Patient is from home with her  who is under hospice care. Family also arranged private duty caregivers in the home to assist with both patient and her . She normally uses a walker for mobility but her ambulation has declined. PT/OT evaluations ordered. If patient is able to ambulate, family would like to take her home today. Otherwise, they will consider [facility]. Social work provided freedom of choice list for both [address] and [facility]. Message to therapy requesting evaluations today if possible.    Electronically signed by YECENIA Cardoza on 9/16/2023 at 10:14 AM

## 2023-09-16 NOTE — ACP (ADVANCE CARE PLANNING)
Advance Care Planning   Healthcare Decision Maker:    Primary Decision Maker: Gallo Moser Child - [phone]    Secondary Decision Maker: Donnell Durand - Spouse - [phone]      Today we documented desired Decision Maker(s), who is (are) NOT Legal Next of [address] [place] [address]. ACP documents are required for decision maker authority.

## 2023-09-16 NOTE — ED NOTES
Report given and care transferred to [facility], [address].      [address]  09/15/23 [id number]

## 2023-09-16 NOTE — CONSULTS
Palliative Care Department  [phone]  Palliative Care Initial Consult  Provider JONNATHAN Garza CNP    Buck Suarez  [id number]  Hospital Day: 2  Date of Initial Consult: 9/16/2023  Referring Provider: ROEL Lara  Palliative Medicine was consulted for assistance with: Goals of care, family support and symptom management     HPI:   Buck Suarez is a 78 y.o. with a medical history of hypertension, hyperlipidemia, and Hx of breast cancer s/p right lumpectomy who was admitted on 9/15/2023 from home with a CHIEF COMPLAINT of altered mental status with hallucinations. Patient was recently discharged on 9/10/2023 when she was treated for generalized weakness and fatigue in the setting of COVID-19. Patient was discharged home with home health care on Keflex for UTI. She presented back to the ED with fatigue and increased confusion with hallucinations. CT showed nonspecific colitis involving mid ascending through mid descending colon. Patient was admitted for further medical management and palliative medicine was consulted for goals of care, family support and symptom management. ASSESSMENT/PLAN:     Pertinent Hospital Diagnoses     Colitis; R/o C. Difficile  Dementia  COVID-19    Palliative Care Encounter / Counseling Regarding Goals of Care  Please see detailed goals of care discussion as below  At this time, Buck Suarez, Does Not have capacity for medical decision-making.   Capacity is time limited and situation/question specific  During encounter Jabier Martinez was surrogate medical decision-maker  Outcome of goals of care meeting:   Change code status to [facility]  Code status [id number]  Advanced Directives: no POA or living will in [facility]  Surrogate/Legal NOK:  Georges Covington (child/POA) [phone]    Spiritual assessment: no spiritual distress identified  Bereavement and grief: to be determined  Referrals to: none today  SUBJECTIVE:     Current medical issues leading to Palliative Medicine to participate in the care of [facility].

## 2023-09-17 LAB
ALBUMIN SERPL-MCNC: 3.1 G/DL (ref 3.5–5.2)
ALP SERPL-CCNC: 61 U/L (ref 35–104)
ALT SERPL-CCNC: 10 U/L (ref 0–32)
ANION GAP SERPL CALCULATED.3IONS-SCNC: 10 MMOL/L (ref 7–16)
AST SERPL-CCNC: 26 U/L (ref 0–31)
BASOPHILS # BLD: 0.03 K/UL (ref 0–0.2)
BASOPHILS NFR BLD: 0 % (ref 0–2)
BILIRUB SERPL-MCNC: 0.3 MG/DL (ref 0–1.2)
BUN SERPL-MCNC: 16 MG/DL (ref 6–23)
C DIFF GDH + TOXINS A+B STL QL IA.RAPID: POSITIVE
CALCIUM SERPL-MCNC: 7.9 MG/DL (ref 8.6–10.2)
CHLORIDE SERPL-SCNC: 106 MMOL/L (ref 98–107)
CO2 SERPL-SCNC: 22 MMOL/L (ref 22–29)
CREAT SERPL-MCNC: 0.7 MG/DL (ref 0.5–1)
EOSINOPHIL # BLD: 0.01 K/UL (ref 0.05–0.5)
EOSINOPHILS RELATIVE PERCENT: 0 % (ref 0–6)
ERYTHROCYTE [DISTWIDTH] IN BLOOD BY AUTOMATED COUNT: 14.1 % (ref 11.5–15)
GFR SERPL CREATININE-BSD FRML MDRD: >60 ML/MIN/1.73M2
GLUCOSE SERPL-MCNC: 93 MG/DL (ref 74–99)
HCT VFR BLD AUTO: 34.3 % (ref 34–48)
HGB BLD-MCNC: 11.3 G/DL (ref 11.5–15.5)
IMM GRANULOCYTES # BLD AUTO: 0.05 K/UL (ref 0–0.58)
IMM GRANULOCYTES NFR BLD: 0 % (ref 0–5)
LYMPHOCYTES NFR BLD: 0.75 K/UL (ref 1.5–4)
LYMPHOCYTES RELATIVE PERCENT: 7 % (ref 20–42)
MAGNESIUM SERPL-MCNC: 2 MG/DL (ref 1.6–2.6)
MCH RBC QN AUTO: 28.4 PG (ref 26–35)
MCHC RBC AUTO-ENTMCNC: 32.9 G/DL (ref 32–34.5)
MCV RBC AUTO: 86.2 FL (ref 80–99.9)
MONOCYTES NFR BLD: 0.81 K/UL (ref 0.1–0.95)
MONOCYTES NFR BLD: 7 % (ref 2–12)
NEUTROPHILS NFR BLD: 86 % (ref 43–80)
NEUTS SEG NFR BLD: 9.88 K/UL (ref 1.8–7.3)
PLATELET # BLD AUTO: 183 K/UL (ref 130–450)
PMV BLD AUTO: 9.1 FL (ref 7–12)
POTASSIUM SERPL-SCNC: 3.5 MMOL/L (ref 3.5–5)
PROT SERPL-MCNC: 5.7 G/DL (ref 6.4–8.3)
RBC # BLD AUTO: 3.98 M/UL (ref 3.5–5.5)
RBC # BLD: ABNORMAL 10*6/UL
SODIUM SERPL-SCNC: 138 MMOL/L (ref 132–146)
SPECIMEN DESCRIPTION: ABNORMAL
TROPONIN I SERPL HS-MCNC: 14 NG/L (ref 0–9)
WBC OTHER # BLD: 11.5 K/UL (ref 4.5–11.5)

## 2023-09-17 PROCEDURE — 85025 COMPLETE CBC W/AUTO DIFF WBC: CPT

## 2023-09-17 PROCEDURE — 97161 PT EVAL LOW COMPLEX 20 MIN: CPT

## 2023-09-17 PROCEDURE — 83735 ASSAY OF MAGNESIUM: CPT

## 2023-09-17 PROCEDURE — 6370000000 HC RX 637 (ALT 250 FOR IP): Performed by: INTERNAL MEDICINE

## 2023-09-17 PROCEDURE — 6360000002 HC RX W HCPCS: Performed by: INTERNAL MEDICINE

## 2023-09-17 PROCEDURE — 2060000000 HC ICU INTERMEDIATE R&B

## 2023-09-17 PROCEDURE — 84484 ASSAY OF TROPONIN QUANT: CPT

## 2023-09-17 PROCEDURE — 80053 COMPREHEN METABOLIC PANEL: CPT

## 2023-09-17 PROCEDURE — 99233 SBSQ HOSP IP/OBS HIGH 50: CPT | Performed by: INTERNAL MEDICINE

## 2023-09-17 RX ADMIN — ANORECTAL OINTMENT: 15.7; .44; 24; 20.6 OINTMENT TOPICAL at 12:07

## 2023-09-17 RX ADMIN — ENOXAPARIN SODIUM 40 MG: 100 INJECTION SUBCUTANEOUS at 10:02

## 2023-09-17 RX ADMIN — Medication 125 MG: at 17:45

## 2023-09-17 RX ADMIN — SERTRALINE 150 MG: 100 TABLET, FILM COATED ORAL at 20:01

## 2023-09-17 RX ADMIN — SALINE NASAL SPRAY 1 SPRAY: 1.5 SOLUTION NASAL at 17:45

## 2023-09-17 RX ADMIN — ACETAMINOPHEN 650 MG: 325 TABLET ORAL at 10:02

## 2023-09-17 RX ADMIN — ACETAMINOPHEN 650 MG: 325 TABLET ORAL at 20:01

## 2023-09-17 RX ADMIN — MULTIVITAMIN TABLET 1 TABLET: TABLET at 10:02

## 2023-09-17 RX ADMIN — RISPERIDONE 0.5 MG: 0.5 TABLET ORAL at 20:44

## 2023-09-17 RX ADMIN — SALINE NASAL SPRAY 1 SPRAY: 1.5 SOLUTION NASAL at 20:01

## 2023-09-17 RX ADMIN — Medication 125 MG: at 12:03

## 2023-09-17 NOTE — DISCHARGE INSTRUCTIONS
Please take vancomycin solution as prescribed  Please practice good handwashing hygiene with soap and water         Clostridioides Difficile (C. diff) Colitis: Care Instructions  Overview     Clostridioides difficile (C. diff) is a type of bacteria that can infect the large intestine, or colon. This can cause the colon to swell and get inflamed. When this happens, it's called C. diff colitis. This type of colitis can cause diarrhea and belly cramps. It can also cause a fever. The infection is most common in people who are taking antibiotics while in the hospital.  If this type of colitis gets serious, it can cause the colon to get much bigger than normal. This is called toxic megacolon. It's an emergency that needs to be treated right away. Signs of this problem include a swollen belly that hurts. They also include a fast heartbeat and a fever. Follow-up care is a key part of your treatment and safety. Be sure to make and go to all appointments, and call your doctor if you are having problems. It's also a good idea to know your test results and keep a list of the medicines you take. How can you care for yourself at home? Your doctor may give you antibiotics to treat Clostridioides difficile (C. diff) colitis. If your doctor prescribes an antibiotic, you will be given a different antibiotic than the one that caused your infection. Take your antibiotics as directed. Do not stop taking them just because you feel better. You need to take the full course of antibiotics. To prevent dehydration, drink plenty of fluids. Choose water and other clear liquids until you feel better. If you have kidney, heart, or liver disease and have to limit fluids, talk with your doctor before you increase the amount of fluids you drink. Eat small amounts of food when you feel like eating. This will help you to get enough nutrition. To prevent the spread of C. diff, practice good hygiene.  Keep your hands clean by washing them well

## 2023-09-17 NOTE — PROGRESS NOTES
Physical Therapy  Facility/Department: [facility]  Physical Therapy Initial Assessment    Name: Mian Sherman  : 1940  MRN: [id number]  Date of Service: 2023      Patient Diagnosis(es): The primary encounter diagnosis was Fatigue, unspecified type. Diagnoses of Generalized weakness, FTT (failure to thrive) in adult, Nausea and vomiting, unspecified vomiting type, and Colitis were also pertinent to this visit. Past Medical History:  has a past medical history of Allergic rhinitis, Alopecia of scalp, Anxiety, Basal cell carcinoma of nose, Breast CA (HCC), Constipated, COVID-19 virus infection, Episodic low back pain, Essential hypertension, Hyperlipidemia, Hypertension, Neck pain, Osteoarthritis, Osteoarthritis of both hips, Osteopenia, and Urinary, incontinence, stress female. Past Surgical History:  has a past surgical history that includes Appendectomy; Breast lumpectomy (2000); Hysterectomy (); Colonoscopy (); other surgical history (Left, 10/18/2013); and joint replacement (Right, 10/01/2012). Referring provider:  Jordan Daniels MD    PT Order:  PT eval and treat     Evaluating PT:  Griselda Police, PT, DPT PT [id number]    [facility]  Diagnosis:  Colitis [K52.9]  Altered mental status [R41.82]  Generalized weakness [R53.1]  FTT (failure to thrive) in adult [R62.7]  Fatigue, unspecified type [R53.83]  Nausea and vomiting, unspecified vomiting type [R11.2]  Precautions:  fall risk, droplet +, contact isolation  Equipment Needs:  none. Pt reported owing a walker. SUBJECTIVE:    Pt lives with her  in a 2 story home with stairs to enter and 1 rail. Bed and bath is on first floor. Pt ambulated with walker PTA. Pt reported there is 24/7 caretakers at home to assist with her . Pt reported her daughter also assists. OBJECTIVE:   Initial Evaluation  Date:  Treatment Short Term/ Long Term   Goals   Was pt agreeable to Eval/treatment?  yes     Does pt

## 2023-09-17 NOTE — DISCHARGE SUMMARY
[facility] Physician Discharge Summary       No follow-up provider specified. Activity level: As tolerated     Dispo: home      Condition on discharge: Stable     Patient ID:  Aleida Duke  [id number]  78 y.o.  1940    Admit date: 9/15/2023    Discharge date and time:  9/17/2023  3:59 PM    Admission Diagnoses: Principal Problem:    Altered mental status  Active Problems:    Colitis    Fatigue  Resolved Problems:    * No resolved hospital problems. *      Discharge Diagnoses: Principal Problem:    Altered mental status  Active Problems:    Colitis    Fatigue  Resolved Problems:    * No resolved hospital problems. *      Consults:  IP CONSULT TO SOCIAL WORK  IP CONSULT TO PALLIATIVE CARE    Procedures: none    Hospital Course:   Patient Aleida Duke is a 78 y.o. presented with Colitis [K52.9]  Altered mental status [R41.82]  Generalized weakness [R53.1]  FTT (failure to thrive) in adult [R62.7]  Fatigue, unspecified type [R53.83]  Nausea and vomiting, unspecified vomiting type [R11.2]    Patient is an 78-year-old female who was admitted due to altered mental status with hallucinations and diarrhea. Of note, patient was discharged on 9/10 after being admitted for generalized weakness and fatigue in the setting of COVID-19. Patient was discharged with Keflex for UTI. Later she received Bactrim later by her PCP for the UTI. Patient had 1 episode of emesis after Bactrim as well as diarrhea. Noted to have auditory and visual hallucinations with increased confusion so they decided to bring her to the ED. While here, C. difficile came back positive and patient started on oral vancomycin. Mentation seems to be improved off antibiotics. For her generalized weakness, she was seen and evaluated by PT. [facility] 07/24. Patient already has [address] at home. Patient also had tested positive for COVID-19 on 9/8. In no respiratory distress and not requiring any oxygen.   Family would like differentiation is maintained without evidence of an acute infarct. There is no evidence of hydrocephalus. Chronic left anterior lateral cerebellar infarct. Mild atrophy and periventricular white matter degenerative change. ORBITS: The visualized portion of the orbits demonstrate no acute abnormality. SINUSES: Mucosal thickening inferior right maxillary sinus. Small bilateral maxillary sinus and left frontal sinus fluid levels suggesting possible acute sinusitis. Mastoids are well pneumatized. SOFT TISSUES/SKULL:  No acute abnormality of the visualized skull or soft tissues. 1. No acute intracranial abnormality. 2. Small bilateral maxillary sinus and left frontal sinus fluid levels suggesting possible acute sinusitis. RECOMMENDATIONS: Careful clinical correlation and follow up recommended. XR CHEST PORTABLE    Result Date: 9/15/2023  EXAMINATION: ONE XRAY VIEW OF THE CHEST 9/15/2023 10:06 pm COMPARISON: 8 September 2023 HISTORY: ORDERING SYSTEM PROVIDED HISTORY: wheezing TECHNOLOGIST PROVIDED HISTORY: Reason for exam:->wheezing FINDINGS: Single AP upright portable chest demonstrate satisfactory expansion of the lungs which are clear. There are no focal alveolar infiltrates or effusions. The cardiac silhouette appears unremarkable. No acute disease.        Patient Instructions:      Medication List        ASK your doctor about these medications      acetaminophen 500 MG tablet  Commonly known as: TYLENOL  Take 2 tablets by mouth every 8 (eight) hours for 5 days Can transition to as needed use as pain level decreases     fluticasone 50 MCG/ACT nasal spray  Commonly known as: FLONASE  1 spray by Each Nostril route daily     MULTIVITAMIN ADULT PO     risperiDONE 0.5 MG tablet  Commonly known as: RisperDAL  Take 1 tablet by mouth 2 times daily     sertraline 100 MG tablet  Commonly known as: ZOLOFT  Take 1.5 tablets by mouth nightly                Note that more than 30 minutes was spent in preparing

## 2023-09-18 VITALS
WEIGHT: 140.2 LBS | HEART RATE: 68 BPM | BODY MASS INDEX: 26.47 KG/M2 | OXYGEN SATURATION: 95 % | TEMPERATURE: 98 F | RESPIRATION RATE: 16 BRPM | HEIGHT: 61 IN | SYSTOLIC BLOOD PRESSURE: 148 MMHG | DIASTOLIC BLOOD PRESSURE: 64 MMHG

## 2023-09-18 LAB
ALBUMIN SERPL-MCNC: 3.2 G/DL (ref 3.5–5.2)
ALP SERPL-CCNC: 65 U/L (ref 35–104)
ALT SERPL-CCNC: 11 U/L (ref 0–32)
ANION GAP SERPL CALCULATED.3IONS-SCNC: 10 MMOL/L (ref 7–16)
AST SERPL-CCNC: 21 U/L (ref 0–31)
BASOPHILS # BLD: 0.02 K/UL (ref 0–0.2)
BASOPHILS NFR BLD: 0 % (ref 0–2)
BILIRUB SERPL-MCNC: 0.2 MG/DL (ref 0–1.2)
BUN SERPL-MCNC: 14 MG/DL (ref 6–23)
CALCIUM SERPL-MCNC: 8 MG/DL (ref 8.6–10.2)
CHLORIDE SERPL-SCNC: 109 MMOL/L (ref 98–107)
CO2 SERPL-SCNC: 24 MMOL/L (ref 22–29)
CREAT SERPL-MCNC: 0.7 MG/DL (ref 0.5–1)
EOSINOPHIL # BLD: 0.13 K/UL (ref 0.05–0.5)
EOSINOPHILS RELATIVE PERCENT: 2 % (ref 0–6)
ERYTHROCYTE [DISTWIDTH] IN BLOOD BY AUTOMATED COUNT: 13.9 % (ref 11.5–15)
GFR SERPL CREATININE-BSD FRML MDRD: >60 ML/MIN/1.73M2
GLUCOSE SERPL-MCNC: 90 MG/DL (ref 74–99)
HCT VFR BLD AUTO: 34 % (ref 34–48)
HGB BLD-MCNC: 11.1 G/DL (ref 11.5–15.5)
IMM GRANULOCYTES # BLD AUTO: 0.05 K/UL (ref 0–0.58)
IMM GRANULOCYTES NFR BLD: 1 % (ref 0–5)
LYMPHOCYTES NFR BLD: 1.27 K/UL (ref 1.5–4)
LYMPHOCYTES RELATIVE PERCENT: 16 % (ref 20–42)
MAGNESIUM SERPL-MCNC: 2 MG/DL (ref 1.6–2.6)
MCH RBC QN AUTO: 28 PG (ref 26–35)
MCHC RBC AUTO-ENTMCNC: 32.6 G/DL (ref 32–34.5)
MCV RBC AUTO: 85.9 FL (ref 80–99.9)
MICROORGANISM SPEC CULT: ABNORMAL
MICROORGANISM SPEC CULT: ABNORMAL
MICROORGANISM SPEC CULT: NORMAL
MONOCYTES NFR BLD: 0.55 K/UL (ref 0.1–0.95)
MONOCYTES NFR BLD: 7 % (ref 2–12)
NEUTROPHILS NFR BLD: 75 % (ref 43–80)
NEUTS SEG NFR BLD: 6.14 K/UL (ref 1.8–7.3)
PLATELET CONFIRMATION: NORMAL
PLATELET, FLUORESCENCE: 193 K/UL (ref 130–450)
PMV BLD AUTO: 9.6 FL (ref 7–12)
POTASSIUM SERPL-SCNC: 3.4 MMOL/L (ref 3.5–5)
PROT SERPL-MCNC: 5.8 G/DL (ref 6.4–8.3)
RBC # BLD AUTO: 3.96 M/UL (ref 3.5–5.5)
RBC # BLD: ABNORMAL 10*6/UL
SODIUM SERPL-SCNC: 143 MMOL/L (ref 132–146)
SPECIMEN DESCRIPTION: ABNORMAL
SPECIMEN DESCRIPTION: NORMAL
WBC OTHER # BLD: 8.2 K/UL (ref 4.5–11.5)

## 2023-09-18 PROCEDURE — 6370000000 HC RX 637 (ALT 250 FOR IP): Performed by: INTERNAL MEDICINE

## 2023-09-18 PROCEDURE — 85025 COMPLETE CBC W/AUTO DIFF WBC: CPT

## 2023-09-18 PROCEDURE — 36415 COLL VENOUS BLD VENIPUNCTURE: CPT

## 2023-09-18 PROCEDURE — 83735 ASSAY OF MAGNESIUM: CPT

## 2023-09-18 PROCEDURE — 80053 COMPREHEN METABOLIC PANEL: CPT

## 2023-09-18 PROCEDURE — 6360000002 HC RX W HCPCS: Performed by: INTERNAL MEDICINE

## 2023-09-18 RX ORDER — POTASSIUM CHLORIDE 20 MEQ/1
20 TABLET, EXTENDED RELEASE ORAL ONCE
Status: COMPLETED | OUTPATIENT
Start: 2023-09-18 | End: 2023-09-18

## 2023-09-18 RX ADMIN — POTASSIUM CHLORIDE 20 MEQ: 1500 TABLET, EXTENDED RELEASE ORAL at 11:08

## 2023-09-18 RX ADMIN — MULTIVITAMIN TABLET 1 TABLET: TABLET at 11:08

## 2023-09-18 RX ADMIN — Medication 125 MG: at 06:03

## 2023-09-18 RX ADMIN — SALINE NASAL SPRAY 1 SPRAY: 1.5 SOLUTION NASAL at 11:07

## 2023-09-18 RX ADMIN — Medication 125 MG: at 11:09

## 2023-09-18 RX ADMIN — FLUTICASONE PROPIONATE 2 SPRAY: 50 SPRAY, METERED NASAL at 11:07

## 2023-09-18 RX ADMIN — SALINE NASAL SPRAY 1 SPRAY: 1.5 SOLUTION NASAL at 11:15

## 2023-09-18 RX ADMIN — Medication 125 MG: at 01:14

## 2023-09-18 RX ADMIN — ENOXAPARIN SODIUM 40 MG: 100 INJECTION SUBCUTANEOUS at 11:07

## 2023-09-18 NOTE — CARE COORDINATION
Social work / Discharge Planning:          Social work spoke to patient's daughter Catherine Garcia via phone. AM PAC 17/24. Discharge plan is home. Social work offered home care which was declined. Social work explained that if family decides they need home care post discharge, they will need to call PCP office.     Electronically signed by YECENIA Esquivel on 9/18/2023 at 12:06 PM

## 2023-09-18 NOTE — PROGRESS NOTES
Physician Progress Note      PATIENT:               Emir Galarza  CSN #:                  [id number]  :                       1940  ADMIT DATE:       9/15/2023 9:29 PM  [facility] DATE:   [place]  PROVIDER #:        Chantelle Wang MD          QUERY TEXT:    Pt admitted with altered mental status. If possible, please document in the   progress notes and discharge summary if you are evaluating and/or treating any   of the following: The medical record reflects the following:  Risk Factors: dementia, recent ATB use  Clinical Indicators: per ER note \". ..presents from home for fatigue and   increased confusion. She has dementia at baseline, but family states she was   more confused today. Family states she was pointing at things that were not   there today and saying things that did not make much sense. She was recently   discharged from here for [facility]. Family states they talked to Dr. Kasie Torres   today who ordered bactrim for what he thought may be a UTI. Scarlett Plants Scarlett Plants \", and per H&P   \". .. AMS and hallucinations, likely 2/2 reaction to antibiotics. No concern   for infection at this time. Continue to monitor men  Treatment: neuro checks    Thank you,  Luke RANGEL, RN, CCDS  Clinical Documentation Integrity  [email or web address]  Phone: [phone]  Options provided:  -- Drug-induced encephalopathy due to, Please specify substance. -- Toxic encephalopathy  -- Other - I will add my own diagnosis  -- Disagree - Not applicable / Not valid  -- Disagree - Clinically unable to determine / Unknown  -- Refer to Clinical Documentation Reviewer    PROVIDER RESPONSE TEXT:    This patient has drug-induced encephalopathy due to bactrim    Query created by: Luke Mcgrath on 2023 12:14 PM      Electronically signed by:   Chantelle Wang MD 2023 2:45 PM

## 2023-09-18 NOTE — PLAN OF CARE
EP called 6/8 to sched a 4-6 week follow up in the [facility] clinic per Dr. Bhatti. The appt is set for 7/14 via video.         ----- Message from Negar Bhatti,  sent at 6/8/2023  5:10 AM CDT -----  Regarding: Follow up  Please arrange follow in [facility] clinic in 4-6 weeks - virtual is okay unless he has an in person appointment.     Negar        Problem: Discharge Planning  Goal: Discharge to home or other facility with appropriate resources  Outcome: Progressing     Problem: Pain  Goal: Verbalizes/displays adequate comfort level or baseline comfort level  Outcome: Progressing     Problem: Safety - Adult  Goal: Free from fall injury  Outcome: Progressing     Problem: ABCDS Injury Assessment  Goal: Absence of physical injury  Outcome: Progressing     Problem: Respiratory - Adult  Goal: Achieves optimal ventilation and oxygenation  Outcome: Progressing     Problem: Musculoskeletal - Adult  Goal: Return mobility to safest level of function  Outcome: Progressing  Goal: Maintain proper alignment of affected body part  Outcome: Progressing     Problem: Gastrointestinal - Adult  Goal: Minimal or absence of nausea and vomiting  Outcome: Progressing  Goal: Maintains or returns to baseline bowel function  Outcome: Progressing     Problem: Infection - Adult  Goal: Absence of infection at discharge  Outcome: Progressing     Problem: Chronic Conditions and Co-morbidities  Goal: Patient's chronic conditions and co-morbidity symptoms are monitored and maintained or improved  Outcome: Progressing

## 2023-10-16 NOTE — PROGRESS NOTES
Physician Progress Note      PATIENT:               Corona Ugalde  CSN #:                  [id number]  :                       1940  ADMIT DATE:       9/15/2023 9:29 PM  [address] [place] [address] DATE:        2023 3:01 PM  RESPONDING  PROVIDER #:        Mercedes Rae MD          QUERY TEXT:    Patient admitted with . Patient with recent Covid infection. If possible,   please document in progress notes and discharge summary if patient has an   active Covid-19 infection or if patient is testing positive for Covid-19   without a current active infection: The medical record reflects the following:  Risk Factors: recent COVID infection  Clinical Indicators: per IM note \". .. COVID-19 infection. Tested positive on   . Continue isolation. In no respiratory distress. Not requiring any   oxygen. Continue to monitor. Darol Cocker Darol Cocker \"  Treatment: isolation    Thank you,  Sebastian RANGEL, RN, CCDS  Clinical Documentation Integrity  [email or web address]  Phone: [phone]  Options provided:  -- Active Covid-19 infection is being treated and/or evaluated on current   encounter  -- Recent positive Covid test result without an active current Covid-19   infection  -- Other - I will add my own diagnosis  -- Disagree - Not applicable / Not valid  -- Disagree - Clinically unable to determine / Unknown  -- Refer to Clinical Documentation Reviewer    PROVIDER RESPONSE TEXT:    Patient had a recent positive Covid test without an active Covid-19 infection. Query created by: Sebastian Jimenez on 2023 7:16 AM      Electronically signed by:   Mercedes Rae MD 10/16/2023 8:52 AM

## 2023-10-30 ENCOUNTER — HOSPITAL ENCOUNTER (EMERGENCY)
Age: 83
Discharge: HOME | End: 2023-10-30

## 2023-10-30 ENCOUNTER — TELEPHONE (OUTPATIENT)
Dept: PRIMARY CARE CLINIC | Age: 83
End: 2023-10-30

## 2023-10-30 NOTE — TELEPHONE ENCOUNTER
----- Message from [facility] sent at 10/26/2023 12:13 PM EDT -----  Subject: Message to Provider    QUESTIONS  Information for Provider? Pt's daughter, Delon Wills, Is requesting Dr. Lazarus Fiscal   or an MA call her at [phone] in regards to her moms dementia. Pt's   daughter stated that she is requesting Dr. Lazarus Fiscal to write a letter stating   Brenton Julian is in capable of making monetary / investment   decisions etc..   ---------------------------------------------------------------------------  --------------  [place] Briseyda  [phone]; OK to leave message on voicemail,OK to respond with electronic   message via [employer] portal (only for patients who have registered [employer]   account)  ---------------------------------------------------------------------------  --------------  SCRIPT ANSWERS  Relationship to Patient? Guardian  Representative Name? Delon Wills  Is the representative on the Communication Release of Information (LYNDSEY)   form in Epic?  Yes

## 2023-10-30 NOTE — TELEPHONE ENCOUNTER
Pt daughter called again wanting a phone call from you at your earliest convenience in regards to the pts mental state, POA and a fall the pt had last night. Pt daughter was advised to take the pt to ER for further testing and to get imaging done to check for any brakes/fractures. Pt daughter states the pt is sore under her breast and it hurts to walk. No black and blue marks as of right now.

## 2023-10-31 NOTE — TELEPHONE ENCOUNTER
Spoke with daughter last evening. Mom has fallen twice at home recently. She last fell in the bathroom and has bruising all over her back. They were currently in [facility] emergency room for evaluation to make sure she did not break anything especially a rib. They would like to have a letter stating that she is incapable of making financial decisions which I agree and therefore we will write this letter. We will also get her set up for physical therapy of their choice due to weakness in her legs and her declining condition. They will let me know where they would like her to go.

## 2023-11-13 ENCOUNTER — OFFICE VISIT (OUTPATIENT)
Dept: PRIMARY CARE CLINIC | Age: 83
End: 2023-11-13
Payer: MEDICARE

## 2023-11-13 VITALS
BODY MASS INDEX: 25.13 KG/M2 | DIASTOLIC BLOOD PRESSURE: 74 MMHG | TEMPERATURE: 98.2 F | OXYGEN SATURATION: 99 % | HEART RATE: 86 BPM | WEIGHT: 133 LBS | SYSTOLIC BLOOD PRESSURE: 138 MMHG

## 2023-11-13 DIAGNOSIS — N30.00 ACUTE CYSTITIS WITHOUT HEMATURIA: ICD-10-CM

## 2023-11-13 DIAGNOSIS — F41.1 GAD (GENERALIZED ANXIETY DISORDER): ICD-10-CM

## 2023-11-13 DIAGNOSIS — Z23 NEED FOR INFLUENZA VACCINATION: ICD-10-CM

## 2023-11-13 DIAGNOSIS — R53.1 WEAKNESS GENERALIZED: Primary | ICD-10-CM

## 2023-11-13 LAB
APPEARANCE FLUID: CLEAR
BILIRUBIN, POC: ABNORMAL
BLOOD URINE, POC: ABNORMAL
CLARITY, POC: CLEAR
COLOR, POC: ABNORMAL
GLUCOSE URINE, POC: ABNORMAL
KETONES, POC: ABNORMAL
LEUKOCYTE EST, POC: ABNORMAL
NITRITE, POC: ABNORMAL
PH, POC: 6
PROTEIN, POC: ABNORMAL
SPECIFIC GRAVITY, POC: 1.02
UROBILINOGEN, POC: 0.2

## 2023-11-13 PROCEDURE — 3078F DIAST BP <80 MM HG: CPT | Performed by: FAMILY MEDICINE

## 2023-11-13 PROCEDURE — 3075F SYST BP GE 130 - 139MM HG: CPT | Performed by: FAMILY MEDICINE

## 2023-11-13 PROCEDURE — 1123F ACP DISCUSS/DSCN MKR DOCD: CPT | Performed by: FAMILY MEDICINE

## 2023-11-13 PROCEDURE — 99213 OFFICE O/P EST LOW 20 MIN: CPT | Performed by: FAMILY MEDICINE

## 2023-11-13 PROCEDURE — 81002 URINALYSIS NONAUTO W/O SCOPE: CPT | Performed by: FAMILY MEDICINE

## 2023-11-13 PROCEDURE — G0008 ADMIN INFLUENZA VIRUS VAC: HCPCS | Performed by: FAMILY MEDICINE

## 2023-11-13 PROCEDURE — 90694 VACC AIIV4 NO PRSRV 0.5ML IM: CPT | Performed by: FAMILY MEDICINE

## 2023-11-13 RX ORDER — ATORVASTATIN CALCIUM 10 MG/1
10 TABLET, FILM COATED ORAL DAILY
COMMUNITY
End: 2023-11-13 | Stop reason: ALTCHOICE

## 2023-11-13 RX ORDER — ATORVASTATIN CALCIUM 10 MG/1
10 TABLET, FILM COATED ORAL DAILY
Qty: 30 TABLET | Refills: 5 | Status: CANCELLED | OUTPATIENT
Start: 2023-11-13

## 2023-11-13 RX ORDER — NITROFURANTOIN 25; 75 MG/1; MG/1
100 CAPSULE ORAL 2 TIMES DAILY
Qty: 20 CAPSULE | Refills: 0 | Status: SHIPPED | OUTPATIENT
Start: 2023-11-13 | End: 2023-11-23

## 2023-11-13 NOTE — PROGRESS NOTES
Trish Wilder, a female of 78 y.o. came to the office 11/13/2023. 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 Needing a cane. Uti: recently found on ER visit in [place] on October 30. She was put on Keflex. Daughter states her mom does not appear to be having any urinary tract symptoms such as urgency, frequency, dysuria. Patient have 3 falls in the past couple weeks with minor bruises in the lower back region. She denies injuring her head. She denies a loss of consciousness prior falling. She denies confusion after fall. Depression: Patient more sad lately since death of her  approximately 2 months ago. She is on the Zoloft. She is off the Risperdal and not having outburst of rage or anger. She lives alone but her daughter has round-the-clock care for her. Someone is sleeping there nightly at the house.       Allergies   Allergen Reactions    Aspirin Swelling and Other (See Comments)     Lips swelling    Penicillins      Unsure of reaction    Bactrim

## 2023-12-12 ENCOUNTER — TELEPHONE (OUTPATIENT)
Dept: PRIMARY CARE CLINIC | Age: 83
End: 2023-12-12

## 2023-12-12 DIAGNOSIS — F02.B3 MODERATE LATE ONSET ALZHEIMER'S DEMENTIA WITH MOOD DISTURBANCE (HCC): ICD-10-CM

## 2023-12-12 DIAGNOSIS — G30.1 MODERATE LATE ONSET ALZHEIMER'S DEMENTIA WITH MOOD DISTURBANCE (HCC): ICD-10-CM

## 2023-12-12 RX ORDER — SERTRALINE HYDROCHLORIDE 100 MG/1
150 TABLET, FILM COATED ORAL NIGHTLY
Qty: 135 TABLET | Refills: 1 | Status: SHIPPED | OUTPATIENT
Start: 2023-12-12

## 2023-12-12 RX ORDER — HYDROXYZINE PAMOATE 25 MG/1
25 CAPSULE ORAL 3 TIMES DAILY PRN
Qty: 30 CAPSULE | Refills: 1 | Status: SHIPPED | OUTPATIENT
Start: 2023-12-12

## 2023-12-12 NOTE — PROGRESS NOTES
Zoloft refilled. Vistaril ordered to take for acute anxiety. Xanax not  recommended at this stage of the patient's life due to side effects with it. Ambulatory

## 2023-12-12 NOTE — TELEPHONE ENCOUNTER
Zoloft refilled. Vistaril called in to take for her anxiety. Xanax not recommended for elderly patients and especially ones with dementia.

## 2023-12-12 NOTE — TELEPHONE ENCOUNTER
Lucille Mcgarry called and said her moms anxiety is very bad with her dementia. She wants to know if you will start her on some xanax or something else you had discussed with her. She also needs her sertriline refilled to  in [place].   [address] phone is [phone]

## 2024-03-12 ENCOUNTER — OFFICE VISIT (OUTPATIENT)
Dept: PRIMARY CARE CLINIC | Age: 84
End: 2024-03-12
Payer: MEDICARE

## 2024-03-12 VITALS
BODY MASS INDEX: 25.86 KG/M2 | HEIGHT: 61 IN | RESPIRATION RATE: 16 BRPM | DIASTOLIC BLOOD PRESSURE: 68 MMHG | OXYGEN SATURATION: 96 % | WEIGHT: 137 LBS | SYSTOLIC BLOOD PRESSURE: 130 MMHG | HEART RATE: 66 BPM | TEMPERATURE: 97.7 F

## 2024-03-12 DIAGNOSIS — G30.1 MILD LATE ONSET ALZHEIMER'S DEMENTIA WITH ANXIETY (HCC): ICD-10-CM

## 2024-03-12 DIAGNOSIS — F02.A4 MILD LATE ONSET ALZHEIMER'S DEMENTIA WITH ANXIETY (HCC): ICD-10-CM

## 2024-03-12 DIAGNOSIS — G30.1 MODERATE LATE ONSET ALZHEIMER'S DEMENTIA WITH MOOD DISTURBANCE (HCC): ICD-10-CM

## 2024-03-12 DIAGNOSIS — M18.0 OSTEOARTHRITIS OF CARPOMETACARPAL (CMC) JOINT OF BOTH THUMBS: ICD-10-CM

## 2024-03-12 DIAGNOSIS — F02.B3 MODERATE LATE ONSET ALZHEIMER'S DEMENTIA WITH MOOD DISTURBANCE (HCC): ICD-10-CM

## 2024-03-12 DIAGNOSIS — F41.1 GAD (GENERALIZED ANXIETY DISORDER): Primary | ICD-10-CM

## 2024-03-12 PROCEDURE — 99213 OFFICE O/P EST LOW 20 MIN: CPT | Performed by: FAMILY MEDICINE

## 2024-03-12 PROCEDURE — 1123F ACP DISCUSS/DSCN MKR DOCD: CPT | Performed by: FAMILY MEDICINE

## 2024-03-12 PROCEDURE — 3078F DIAST BP <80 MM HG: CPT | Performed by: FAMILY MEDICINE

## 2024-03-12 PROCEDURE — 3075F SYST BP GE 130 - 139MM HG: CPT | Performed by: FAMILY MEDICINE

## 2024-03-12 RX ORDER — DOCUSATE SODIUM 100 MG/1
100 CAPSULE, LIQUID FILLED ORAL NIGHTLY
COMMUNITY

## 2024-03-12 ASSESSMENT — PATIENT HEALTH QUESTIONNAIRE - PHQ9
8. MOVING OR SPEAKING SO SLOWLY THAT OTHER PEOPLE COULD HAVE NOTICED. OR THE OPPOSITE, BEING SO FIGETY OR RESTLESS THAT YOU HAVE BEEN MOVING AROUND A LOT MORE THAN USUAL: 1
2. FEELING DOWN, DEPRESSED OR HOPELESS: 3
1. LITTLE INTEREST OR PLEASURE IN DOING THINGS: 3
4. FEELING TIRED OR HAVING LITTLE ENERGY: 2
SUM OF ALL RESPONSES TO PHQ QUESTIONS 1-9: 9
6. FEELING BAD ABOUT YOURSELF - OR THAT YOU ARE A FAILURE OR HAVE LET YOURSELF OR YOUR FAMILY DOWN: 0
SUM OF ALL RESPONSES TO PHQ QUESTIONS 1-9: 9
5. POOR APPETITE OR OVEREATING: 0
10. IF YOU CHECKED OFF ANY PROBLEMS, HOW DIFFICULT HAVE THESE PROBLEMS MADE IT FOR YOU TO DO YOUR WORK, TAKE CARE OF THINGS AT HOME, OR GET ALONG WITH OTHER PEOPLE: 1
7. TROUBLE CONCENTRATING ON THINGS, SUCH AS READING THE NEWSPAPER OR WATCHING TELEVISION: 0
3. TROUBLE FALLING OR STAYING ASLEEP: 0
9. THOUGHTS THAT YOU WOULD BE BETTER OFF DEAD, OR OF HURTING YOURSELF: 0
SUM OF ALL RESPONSES TO PHQ9 QUESTIONS 1 & 2: 6
SUM OF ALL RESPONSES TO PHQ QUESTIONS 1-9: 9
SUM OF ALL RESPONSES TO PHQ QUESTIONS 1-9: 9

## 2024-03-12 NOTE — PROGRESS NOTES
rate and regular rhythm.      Heart sounds: No murmur heard.  Pulmonary:      Effort: Pulmonary effort is normal.      Breath sounds: Normal breath sounds. No wheezing or rales.   Abdominal:      General: Bowel sounds are normal.      Palpations: Abdomen is soft. There is no mass.      Tenderness: There is no abdominal tenderness. There is no guarding or rebound.   Musculoskeletal:         General: Normal range of motion.      Right hand: Deformity present.      Left hand: Deformity present.      Cervical back: Neck supple.      Comments: Over dip jts of thumbs.    Lymphadenopathy:      Cervical: No cervical adenopathy.   Skin:     General: Skin is warm and dry.   Neurological:      Mental Status: She is alert and oriented to person, place, and time.   Psychiatric:         Mood and Affect: Mood normal.         ASSESSMENT AND PLAN:    Maryellen was seen today for anxiety.    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    Jose Pastro, DO

## 2024-03-21 ENCOUNTER — TELEPHONE (OUTPATIENT)
Dept: PRIMARY CARE CLINIC | Age: 84
End: 2024-03-21

## 2024-03-21 NOTE — TELEPHONE ENCOUNTER
Pt daughter called and states her mom has a sore throat and it feels like both of her ears are clogged. Daughter asking for a phone call from you to discuss what she can give her mom OTC or prescription to help her feel better since first available is Monday. Please advise, will call pt back with information.    [id number] OBS or IP - high complexity OR 50-79 mins

## 2024-03-21 NOTE — TELEPHONE ENCOUNTER
She can take over-the-counter Advil or Tylenol cold and sinus for the symptoms.  If no better I will see her as scheduled or they can make an appointment.

## 2024-03-25 ENCOUNTER — OFFICE VISIT (OUTPATIENT)
Dept: PRIMARY CARE CLINIC | Age: 84
End: 2024-03-25
Payer: MEDICARE

## 2024-03-25 VITALS
HEART RATE: 70 BPM | OXYGEN SATURATION: 98 % | BODY MASS INDEX: 24.66 KG/M2 | WEIGHT: 134 LBS | HEIGHT: 62 IN | SYSTOLIC BLOOD PRESSURE: 128 MMHG | DIASTOLIC BLOOD PRESSURE: 68 MMHG | TEMPERATURE: 97.6 F

## 2024-03-25 DIAGNOSIS — S60.211A CONTUSION OF RIGHT WRIST, INITIAL ENCOUNTER: ICD-10-CM

## 2024-03-25 DIAGNOSIS — H90.3 SENSORINEURAL HEARING LOSS (SNHL) OF BOTH EARS: Primary | ICD-10-CM

## 2024-03-25 DIAGNOSIS — H61.23 BILATERAL IMPACTED CERUMEN: ICD-10-CM

## 2024-03-25 PROCEDURE — 99212 OFFICE O/P EST SF 10 MIN: CPT | Performed by: FAMILY MEDICINE

## 2024-03-25 PROCEDURE — 1123F ACP DISCUSS/DSCN MKR DOCD: CPT | Performed by: FAMILY MEDICINE

## 2024-03-25 PROCEDURE — 3078F DIAST BP <80 MM HG: CPT | Performed by: FAMILY MEDICINE

## 2024-03-25 PROCEDURE — 3074F SYST BP LT 130 MM HG: CPT | Performed by: FAMILY MEDICINE

## 2024-03-25 NOTE — PROGRESS NOTES
transition to as needed use as pain level decreases 360 tablet 1    Multiple Vitamin (MULTIVITAMIN ADULT PO) Multivit-Min-Fa-Lycopen-Lutein (Adults 50 Plus) 1 EACH Tablet Active 1 EACH PO Daily April 27th, 2022 12:00am      hydrOXYzine pamoate (VISTARIL) 25 MG capsule Take 1 capsule by mouth 3 times daily as needed for Anxiety (Patient not taking: Reported on 3/12/2024) 30 capsule 1     No current facility-administered medications on file prior to visit.       Review of Systems   HENT:  Positive for hearing loss. Negative for ear discharge.      other review of systems reviewed and are negative    OBJECTIVE:  /68   Pulse 70   Temp 97.6 °F (36.4 °C)   Ht 1.562 m (5' 1.5\")   Wt 60.8 kg (134 lb)   SpO2 98%   BMI 24.91 kg/m²      Physical Exam  Constitutional:       General: She is not in acute distress.     Appearance: Normal appearance. She is not ill-appearing, toxic-appearing or diaphoretic.   HENT:      Head: Normocephalic.      Right Ear: There is impacted cerumen.      Left Ear: There is impacted cerumen.   Eyes:      General: No scleral icterus.  Pulmonary:      Effort: Pulmonary effort is normal.   Musculoskeletal:      Right wrist: No swelling. Normal range of motion.      Comments: R: Bruise lateral wrist.    Neurological:      Mental Status: She is alert and oriented to person, place, and time.   Psychiatric:         Mood and Affect: Mood normal.         ASSESSMENT AND PLAN:    Maryellen was seen today for ear fullness.    Diagnoses and all orders for this visit:    Sensorineural hearing loss (SNHL) of both ears    Bilateral impacted cerumen  -     EAR CERUMEN REMOVAL    Contusion of right wrist, initial encounter    -     Procedure note:  Cerumen impaction(s) removed in office today by me using waterpik and lighted loop curette.  Patient tolerated procedure well.         Return if symptoms worsen or fail to improve, for or for acute problem.    Jose Pastor, DO

## 2024-03-28 ENCOUNTER — TELEPHONE (OUTPATIENT)
Dept: PRIMARY CARE CLINIC | Age: 84
End: 2024-03-28

## 2024-03-28 RX ORDER — MELOXICAM 7.5 MG/1
7.5 TABLET ORAL DAILY
Qty: 30 TABLET | Refills: 1 | Status: SHIPPED | OUTPATIENT
Start: 2024-03-28

## 2024-03-28 NOTE — TELEPHONE ENCOUNTER
Tanesha called and said the tylenol isnt helping her moms hands . She want to know if you cheo do a referral to DR Yeboah? Or what is the next step

## 2024-04-22 ENCOUNTER — TELEPHONE (OUTPATIENT)
Dept: PRIMARY CARE CLINIC | Age: 84
End: 2024-04-22

## 2024-04-22 NOTE — TELEPHONE ENCOUNTER
----- Message from Baylee Bailey sent at 4/22/2024  1:02 PM EDT -----  Subject: Message to Provider    QUESTIONS  Information for Provider? PATIENTS DAUGHTER, BEVERLY IS CALLING TO SPEAK WITH   THE OFFICE REGARDING PATIENT DECLINE IN DEMENTIA SYMPTOMS. SHE WOULD LIKE   TO SEE IF THE DOCTOR WILL SPEAK WITH HER WITHOUT THE PATIENT PRESENT AND   OR A VIRTUAL VISIT, AS IT IS DIFFICULT TO BRING PATIENT TO THE OFFICE.   PLEASE CALL TO ADVISE  ---------------------------------------------------------------------------  --------------  CALL BACK INFO  [phone]; OK to leave message on voicemail  ---------------------------------------------------------------------------  --------------  SCRIPT ANSWERS  Relationship to Patient? Other/Third Party  Representative Name? BEVERLY  Is the representative on the Communication Release of Information (LYNDSEY)   form in Epic? Yes

## 2024-04-23 NOTE — TELEPHONE ENCOUNTER
Spoke with patient's daughter last evening.  Patient is now getting up through the night and rearranging her closet, checking her purse and rearranging her credit cards, she will be up for several hours during the early morning hours.  Then will sleep till 11:00 or so.  She is having a harder time getting dressed by herself.  If she has to go anywhere that day she has a tough time getting adjusted to leaving the house.  She is having hard time picking her close out.  She is having difficulty putting her make-up on anymore as well.  I let Tanesha know that this is probably her dementia worsening.  Continue to Vistaril as needed for anxiety since she states when she gives this to her it does help her acute anxiety attack.  Follow-up as needed

## 2024-05-01 ENCOUNTER — APPOINTMENT (OUTPATIENT)
Dept: CT IMAGING | Age: 84
End: 2024-05-01
Payer: MEDICARE

## 2024-05-01 ENCOUNTER — HOSPITAL ENCOUNTER (EMERGENCY)
Age: 84
Discharge: HOME | End: 2024-05-02
Attending: EMERGENCY MEDICINE
Payer: MEDICARE

## 2024-05-01 VITALS
HEART RATE: 68 BPM | TEMPERATURE: 97.7 F | RESPIRATION RATE: 18 BRPM | OXYGEN SATURATION: 97 % | DIASTOLIC BLOOD PRESSURE: 87 MMHG | SYSTOLIC BLOOD PRESSURE: 177 MMHG

## 2024-05-01 DIAGNOSIS — S01.01XA LACERATION OF SCALP, INITIAL ENCOUNTER: ICD-10-CM
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
## 2024-05-01 DIAGNOSIS — R27.0 ATAXIA: ICD-10-CM

## 2024-05-01 DIAGNOSIS — E07.9 THYROID LESION: ICD-10-CM

## 2024-05-01 DIAGNOSIS — N30.00 ACUTE CYSTITIS WITHOUT HEMATURIA: ICD-10-CM

## 2024-05-01 DIAGNOSIS — S09.90XA INJURY OF HEAD, INITIAL ENCOUNTER: Primary | ICD-10-CM
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
## 2024-05-01 LAB
ERYTHROCYTE [DISTWIDTH] IN BLOOD BY AUTOMATED COUNT: 13.8 % (ref 11.5–15)
HCT VFR BLD AUTO: 37.5 % (ref 34–48)
HGB BLD-MCNC: 12.4 G/DL (ref 11.5–15.5)
INR PPP: 1.1
MCH RBC QN AUTO: 28.6 PG (ref 26–35)
MCHC RBC AUTO-ENTMCNC: 33.1 G/DL (ref 32–34.5)
MCV RBC AUTO: 86.4 FL (ref 80–99.9)
PLATELET # BLD AUTO: 189 K/UL (ref 130–450)
PMV BLD AUTO: 9 FL (ref 7–12)
PROTHROMBIN TIME: 11.8 SEC (ref 9.3–12.4)
RBC # BLD AUTO: 4.34 M/UL (ref 3.5–5.5)
WBC OTHER # BLD: 6.5 K/UL (ref 4.5–11.5)

## 2024-05-01 PROCEDURE — 83690 ASSAY OF LIPASE: CPT

## 2024-05-01 PROCEDURE — 71250 CT THORAX DX C-: CPT

## 2024-05-01 PROCEDURE — 80307 DRUG TEST PRSMV CHEM ANLYZR: CPT

## 2024-05-01 PROCEDURE — 83605 ASSAY OF LACTIC ACID: CPT

## 2024-05-01 PROCEDURE — 2500000003 HC RX 250 WO HCPCS: Performed by: EMERGENCY MEDICINE

## 2024-05-01 PROCEDURE — 84443 ASSAY THYROID STIM HORMONE: CPT

## 2024-05-01 PROCEDURE — 84439 ASSAY OF FREE THYROXINE: CPT

## 2024-05-01 PROCEDURE — 82248 BILIRUBIN DIRECT: CPT

## 2024-05-01 PROCEDURE — 82550 ASSAY OF CK (CPK): CPT

## 2024-05-01 PROCEDURE — 80053 COMPREHEN METABOLIC PANEL: CPT

## 2024-05-01 PROCEDURE — 6360000002 HC RX W HCPCS: Performed by: EMERGENCY MEDICINE

## 2024-05-01 PROCEDURE — 87086 URINE CULTURE/COLONY COUNT: CPT

## 2024-05-01 PROCEDURE — 84484 ASSAY OF TROPONIN QUANT: CPT

## 2024-05-01 PROCEDURE — 99284 EMERGENCY DEPT VISIT MOD MDM: CPT

## 2024-05-01 PROCEDURE — 74176 CT ABD & PELVIS W/O CONTRAST: CPT

## 2024-05-01 PROCEDURE — 83735 ASSAY OF MAGNESIUM: CPT

## 2024-05-01 PROCEDURE — 83880 ASSAY OF NATRIURETIC PEPTIDE: CPT

## 2024-05-01 PROCEDURE — 80143 DRUG ASSAY ACETAMINOPHEN: CPT

## 2024-05-01 PROCEDURE — 85027 COMPLETE CBC AUTOMATED: CPT

## 2024-05-01 PROCEDURE — 70450 CT HEAD/BRAIN W/O DYE: CPT

## 2024-05-01 PROCEDURE — 90471 IMMUNIZATION ADMIN: CPT | Performed by: EMERGENCY MEDICINE

## 2024-05-01 PROCEDURE — 85610 PROTHROMBIN TIME: CPT

## 2024-05-01 PROCEDURE — G0480 DRUG TEST DEF 1-7 CLASSES: HCPCS

## 2024-05-01 PROCEDURE — 81001 URINALYSIS AUTO W/SCOPE: CPT

## 2024-05-01 PROCEDURE — 80179 DRUG ASSAY SALICYLATE: CPT

## 2024-05-01 PROCEDURE — 93005 ELECTROCARDIOGRAM TRACING: CPT | Performed by: EMERGENCY MEDICINE

## 2024-05-01 PROCEDURE — 82150 ASSAY OF AMYLASE: CPT

## 2024-05-01 PROCEDURE — 72125 CT NECK SPINE W/O DYE: CPT

## 2024-05-01 PROCEDURE — 90714 TD VACC NO PRESV 7 YRS+ IM: CPT | Performed by: EMERGENCY MEDICINE

## 2024-05-01 RX ORDER — CHOLECALCIFEROL (VITAMIN D3) 125 MCG
5 CAPSULE ORAL DAILY
COMMUNITY

## 2024-05-01 RX ORDER — HYDROXYZINE PAMOATE 25 MG/1
25 CAPSULE ORAL 3 TIMES DAILY PRN
COMMUNITY

## 2024-05-01 RX ORDER — LIDOCAINE HYDROCHLORIDE AND EPINEPHRINE 10; 10 MG/ML; UG/ML
20 INJECTION, SOLUTION INFILTRATION; PERINEURAL ONCE
Status: COMPLETED | OUTPATIENT
Start: 2024-05-01 | End: 2024-05-01

## 2024-05-01 RX ADMIN — LIDOCAINE HYDROCHLORIDE AND EPINEPHRINE 20 ML: 10; 10 INJECTION, SOLUTION INFILTRATION; PERINEURAL at 23:59

## 2024-05-01 RX ADMIN — CLOSTRIDIUM TETANI TOXOID ANTIGEN (FORMALDEHYDE INACTIVATED) AND CORYNEBACTERIUM DIPHTHERIAE TOXOID ANTIGEN (FORMALDEHYDE INACTIVATED) 0.5 ML: 5; 2 INJECTION, SUSPENSION INTRAMUSCULAR at 23:44

## 2024-05-02 ENCOUNTER — TELEPHONE (OUTPATIENT)
Dept: PRIMARY CARE CLINIC | Age: 84
End: 2024-05-02

## 2024-05-02 PROBLEM — N30.00 ACUTE CYSTITIS WITHOUT HEMATURIA: Status: ACTIVE | Noted: 2024-05-02

## 2024-05-02 LAB
ALBUMIN SERPL-MCNC: 4.5 G/DL (ref 3.5–5.2)
ALP SERPL-CCNC: 88 U/L (ref 35–104)
ALT SERPL-CCNC: 12 U/L (ref 0–32)
AMPHET UR QL SCN: NEGATIVE
AMYLASE SERPL-CCNC: 160 U/L (ref 20–100)
ANION GAP SERPL CALCULATED.3IONS-SCNC: 12 MMOL/L (ref 7–16)
APAP SERPL-MCNC: 6 UG/ML (ref 10–30)
AST SERPL-CCNC: 20 U/L (ref 0–31)
BACTERIA URNS QL MICRO: ABNORMAL
BARBITURATES UR QL SCN: NEGATIVE
BENZODIAZ UR QL: NEGATIVE
BILIRUB DIRECT SERPL-MCNC: <0.2 MG/DL (ref 0–0.3)
BILIRUB INDIRECT SERPL-MCNC: NORMAL MG/DL (ref 0–1)
BILIRUB SERPL-MCNC: 0.3 MG/DL (ref 0–1.2)
BILIRUB UR QL STRIP: NEGATIVE
BNP SERPL-MCNC: 441 PG/ML (ref 0–450)
BUN SERPL-MCNC: 30 MG/DL (ref 6–23)
BUPRENORPHINE UR QL: NEGATIVE
CALCIUM SERPL-MCNC: 9 MG/DL (ref 8.6–10.2)
CANNABINOIDS UR QL SCN: NEGATIVE
CHLORIDE SERPL-SCNC: 103 MMOL/L (ref 98–107)
CK SERPL-CCNC: 71 U/L (ref 20–180)
CLARITY UR: CLEAR
CO2 SERPL-SCNC: 29 MMOL/L (ref 22–29)
COCAINE UR QL SCN: NEGATIVE
COLOR UR: YELLOW
CREAT SERPL-MCNC: 0.9 MG/DL (ref 0.5–1)
EPI CELLS #/AREA URNS HPF: ABNORMAL /HPF
ETHANOLAMINE SERPL-MCNC: <10 MG/DL
FENTANYL UR QL: NEGATIVE
GFR, ESTIMATED: 64 ML/MIN/1.73M2
GLUCOSE SERPL-MCNC: 100 MG/DL (ref 74–99)
GLUCOSE UR STRIP-MCNC: NEGATIVE MG/DL
HGB UR QL STRIP.AUTO: ABNORMAL
KETONES UR STRIP-MCNC: NEGATIVE MG/DL
LACTATE BLDV-SCNC: 0.8 MMOL/L (ref 0.5–2.2)
LEUKOCYTE ESTERASE UR QL STRIP: ABNORMAL
LIPASE SERPL-CCNC: 34 U/L (ref 13–60)
MAGNESIUM SERPL-MCNC: 2.1 MG/DL (ref 1.6–2.6)
METHADONE UR QL: NEGATIVE
NITRITE UR QL STRIP: NEGATIVE
OPIATES UR QL SCN: NEGATIVE
OXYCODONE UR QL SCN: NEGATIVE
PCP UR QL SCN: NEGATIVE
PH UR STRIP: 6.5 [PH] (ref 5–9)
POTASSIUM SERPL-SCNC: 4.3 MMOL/L (ref 3.5–5)
PROT SERPL-MCNC: 7.4 G/DL (ref 6.4–8.3)
PROT UR STRIP-MCNC: NEGATIVE MG/DL
RBC #/AREA URNS HPF: ABNORMAL /HPF
SALICYLATES SERPL-MCNC: <0.3 MG/DL (ref 0–30)
SODIUM SERPL-SCNC: 144 MMOL/L (ref 132–146)
SP GR UR STRIP: 1.01 (ref 1–1.03)
T4 FREE SERPL-MCNC: 0.9 NG/DL (ref 0.9–1.7)
TEST INFORMATION: NORMAL
TOXIC TRICYCLIC SC,BLOOD: NEGATIVE
TROPONIN I SERPL HS-MCNC: 11 NG/L (ref 0–9)
TSH SERPL DL<=0.05 MIU/L-ACNC: 2.03 UIU/ML (ref 0.27–4.2)
UROBILINOGEN UR STRIP-ACNC: 0.2 EU/DL (ref 0–1)
WBC #/AREA URNS HPF: ABNORMAL /HPF

## 2024-05-02 PROCEDURE — 2580000003 HC RX 258: Performed by: EMERGENCY MEDICINE

## 2024-05-02 PROCEDURE — 6360000002 HC RX W HCPCS: Performed by: EMERGENCY MEDICINE

## 2024-05-02 RX ORDER — CEFDINIR 300 MG/1
300 CAPSULE ORAL 2 TIMES DAILY
Qty: 14 CAPSULE | Refills: 0 | Status: SHIPPED | OUTPATIENT
Start: 2024-05-02 | End: 2024-05-09

## 2024-05-02 RX ADMIN — WATER 2000 MG: 1 INJECTION INTRAMUSCULAR; INTRAVENOUS; SUBCUTANEOUS at 00:54

## 2024-05-02 NOTE — TELEPHONE ENCOUNTER
----- Message from Shanel Jesus sent at 5/2/2024  9:35 AM EDT -----  Subject: Appointment Request    Reason for Call: Established Patient Appointment needed: Routine ED Follow   Up Visit    QUESTIONS    Reason for appointment request? Available appointments did not meet   patient need     Additional Information for Provider? Patients daughter is needing to get   mom in for her ED f/u, she had fell and received stitches in her head that   would need to be taken out please advise. Patients daughter Tanesha would   like either a tues or Wed appointment and would like a callback to be   schedule. Also like to discuss patient going to a nursing facility and   lesion on thyroid. Didn't see any appointments for the patient' daughter   for prefer days within the 5 day window.  ---------------------------------------------------------------------------  --------------  CALL BACK INFO  [phone]; OK to leave message on [place],OK to respond with electronic   message via [employer] portal (only for patients who have registered [employer]   account)  ---------------------------------------------------------------------------  --------------  SCRIPT ANSWERS

## 2024-05-02 NOTE — DISCHARGE INSTRUCTIONS
Patient is a very high fall risk she should always have some the right near her 24 hours a day 7 days a week also should follow-up with the thyroid studies as an outpatient since there was a thyroid lesion noted on the CAT scan she should get an MRI of her head is soon as possible because she could have possibly had a small stroke that makes her ataxic and difficulty walking as well I do recommend her being admitted and Park placement and in the rehab facility or nursing home or leave at the very minimal assisted living and you should talk to your doctor soon as possible

## 2024-05-02 NOTE — DISCHARGE INSTR - COC
Continuity of Care Form    Patient Name: Maryellen Zarate   :  1940  MRN:  [id number]    Admit date:  2024  Discharge date:  ***    Code Status Order: Prior   Advance Directives:     Admitting Physician:  No admitting provider for patient encounter.  PCP: Jose Pastor DO    Discharging Nurse: ***  Discharging Hospital Unit/Room#:   Discharging Unit Phone Number: ***    Emergency Contact:   Extended Emergency Contact Information  Primary Emergency Contact: Tanesha Quigley  Home Phone: [phone]  Relation: Child  Preferred language: English    Past Surgical History:  Past Surgical History:   Procedure Laterality Date    APPENDECTOMY      child    BREAST LUMPECTOMY  2000    COLONOSCOPY      HYSTERECTOMY (CERVIX STATUS UNKNOWN)  1979    BRANDON    JOINT REPLACEMENT Right 10/01/2012     Right Hip - [facility]    OTHER SURGICAL HISTORY Left 10/18/2013    full thickness skin graft, repair open nose defect - Dr. Hargrove / Dr. Escamilla       Immunization History:   Immunization History   Administered Date(s) Administered    COVID-19, PFIZER Bivalent, DO NOT Dilute, (age 12y+), IM, 30 mcg/0.3 mL 10/19/2022    COVID-19, PFIZER PURPLE top, DILUTE for use, (age 12 y+), 30mcg/0.3mL 2021, 2021, 2021    Influenza 10/15/2013    Influenza Virus Vaccine 2012, 10/02/2014    Influenza, FLUAD, (age 65 y+), Adjuvanted, 0.5mL 2020, 2021, 10/07/2022, 2023    Influenza, High Dose (Fluzone 65 yrs and older) 10/01/2015, 10/21/2016, 2017, 2018, 2020    Influenza, Triv, inactivated, subunit, adjuvanted, IM (Fluad 65 yrs and older) 2019    Pneumococcal, PCV-13, PREVNAR 13, (age 6w+), IM, 0.5mL 2015    Pneumococcal, PPSV23, PNEUMOVAX 23, (age 2y+), SC/IM, 0.5mL 2008    TD 5LF, TENIVAC, (age 7y+), IM, 0.5mL 2024    Zoster Live (Zostavax) 2014       Active Problems:  Patient Active Problem List   Diagnosis Code

## 2024-05-02 NOTE — ED PROVIDER NOTES
IntraDERmal Given 5/1/24 3455)   oxidized cellulose external pads 1 each (1 each Topical Given 5/1/24 8689)   cefTRIAXone (ROCEPHIN) 2,000 mg in sterile water 20 mL IV syringe (2,000 mg IntraVENous Given 5/2/24 0054)       LACERATION REPAIR  PROCEDURE NOTE:  Unless otherwise indicated, this procedure was done or directly supervised by the ED attending.    Laceration #: 1.  Location: occiput  Length: 2cm.  The wound area was cleansend with shur-clens and draped in a sterile fashion.  The wound area was anesthetized with not required.  WOUND COMPLEXITY:    Debridement: partial thickness and None.  Undermining: partial thickness and None.  Wound Margins Revised: no.  Flaps Aligned: yes.  The wound was explored with the following results No foreign bodies found, no foreign body or tendon injury seen.  The wound was closed with staples.  Dressing:  bacitracin and surgicel was placed.    Total number suture and staples: 4         Medical Decision Making:       Maryellen Zarate is a 83 y.o. female presenting to the ED for dizzy and fall, beginning 7 days ago.  The complaint has been persistent, mild in severity, and worsened by nothing.  Today she fell again and she hit her head and she has a 2 cm laceration to the back of her head we updated her tetanus we cleaned it with Shur-Clens and I put 4 staples in that approximated the wound will then put Surgicel on in bed and a bandage please note I was very emphatic that I feel the patient should be admitted because she is a very high fall risk I also do not know if she had a stroke and also her thyroid studies are pending the daughter who is power of  is very adamant that they want to take her home they do understand the risk of this and that I did still do not have an etiology that why she is she is so off balance and ataxic and she had an MRI of her head is soon as possible but either way I did not recommend the patient being discharged home and wanted her to be

## 2024-05-04 LAB
EKG ATRIAL RATE: 69 BPM
EKG P AXIS: 62 DEGREES
EKG P-R INTERVAL: 154 MS
EKG Q-T INTERVAL: 408 MS
EKG QRS DURATION: 72 MS
EKG QTC CALCULATION (BAZETT): 437 MS
EKG R AXIS: -25 DEGREES
EKG T AXIS: 45 DEGREES
EKG VENTRICULAR RATE: 69 BPM
MICROORGANISM SPEC CULT: ABNORMAL
MICROORGANISM SPEC CULT: ABNORMAL
SPECIMEN DESCRIPTION: ABNORMAL

## 2024-05-04 PROCEDURE — 93010 ELECTROCARDIOGRAM REPORT: CPT | Performed by: INTERNAL MEDICINE

## 2024-05-06 ENCOUNTER — OFFICE VISIT (OUTPATIENT)
Dept: PRIMARY CARE CLINIC | Age: 84
End: 2024-05-06
Payer: MEDICARE

## 2024-05-06 VITALS
WEIGHT: 140 LBS | BODY MASS INDEX: 26.02 KG/M2 | TEMPERATURE: 97.2 F | SYSTOLIC BLOOD PRESSURE: 132 MMHG | DIASTOLIC BLOOD PRESSURE: 68 MMHG | HEART RATE: 69 BPM | OXYGEN SATURATION: 95 %

## 2024-05-06 DIAGNOSIS — S01.01XS SCALP LACERATION, SEQUELA: Primary | ICD-10-CM

## 2024-05-06 DIAGNOSIS — F02.A4 MILD LATE ONSET ALZHEIMER'S DEMENTIA WITH ANXIETY (HCC): ICD-10-CM

## 2024-05-06 DIAGNOSIS — G30.1 MILD LATE ONSET ALZHEIMER'S DEMENTIA WITH ANXIETY (HCC): ICD-10-CM

## 2024-05-06 DIAGNOSIS — N30.00 ACUTE CYSTITIS WITHOUT HEMATURIA: ICD-10-CM

## 2024-05-06 PROCEDURE — 1123F ACP DISCUSS/DSCN MKR DOCD: CPT | Performed by: FAMILY MEDICINE

## 2024-05-06 PROCEDURE — 3078F DIAST BP <80 MM HG: CPT | Performed by: FAMILY MEDICINE

## 2024-05-06 PROCEDURE — 99024 POSTOP FOLLOW-UP VISIT: CPT | Performed by: FAMILY MEDICINE

## 2024-05-06 PROCEDURE — 99213 OFFICE O/P EST LOW 20 MIN: CPT | Performed by: FAMILY MEDICINE

## 2024-05-06 PROCEDURE — 3075F SYST BP GE 130 - 139MM HG: CPT | Performed by: FAMILY MEDICINE

## 2024-05-06 RX ORDER — HYDROXYZINE PAMOATE 50 MG/1
50 CAPSULE ORAL 3 TIMES DAILY PRN
Qty: 30 CAPSULE | Refills: 2 | Status: SHIPPED | OUTPATIENT
Start: 2024-05-06

## 2024-05-06 SDOH — ECONOMIC STABILITY: FOOD INSECURITY: WITHIN THE PAST 12 MONTHS, THE FOOD YOU BOUGHT JUST DIDN'T LAST AND YOU DIDN'T HAVE MONEY TO GET MORE.: NEVER TRUE

## 2024-05-06 SDOH — ECONOMIC STABILITY: FOOD INSECURITY: WITHIN THE PAST 12 MONTHS, YOU WORRIED THAT YOUR FOOD WOULD RUN OUT BEFORE YOU GOT MONEY TO BUY MORE.: NEVER TRUE

## 2024-05-06 SDOH — ECONOMIC STABILITY: INCOME INSECURITY: HOW HARD IS IT FOR YOU TO PAY FOR THE VERY BASICS LIKE FOOD, HOUSING, MEDICAL CARE, AND HEATING?: NOT HARD AT ALL

## 2024-05-06 ASSESSMENT — ENCOUNTER SYMPTOMS: DIARRHEA: 1

## 2024-05-06 NOTE — PROGRESS NOTES
Maryellen Zarate, a female of 83 y.o. came to the office 5/6/2024.     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   Fall  Incident onset: 3 falls in 10 days. She fell from a height of 3 to 5 ft. The point of impact was the head. The pain is present in the head. Treatments tried: using walker now.  needed 4 staples placed.    - thyroid lesion seen on CT scan.     Allergies   Allergen Reactions    Aspirin Swelling and Other (See Comments)     Lips swelling    Penicillins      Unsure of reaction    Bactrim [Sulfamethoxazole-Trimethoprim]      Had terrible shaking       Current Outpatient Medications on File Prior to Visit   Medication Sig Dispense Refill    cefdinir (OMNICEF) 300 MG capsule Take 1 capsule by mouth 2 times daily for 7 days 14 capsule 0    meloxicam (MOBIC) 7.5 MG tablet Take 1 tablet by mouth daily 30 tablet 1    docusate sodium (COLACE) 100 MG capsule Take 1

## 2024-05-20 DIAGNOSIS — G30.1 MODERATE LATE ONSET ALZHEIMER'S DEMENTIA WITH MOOD DISTURBANCE (HCC): ICD-10-CM

## 2024-05-20 DIAGNOSIS — F02.B3 MODERATE LATE ONSET ALZHEIMER'S DEMENTIA WITH MOOD DISTURBANCE (HCC): ICD-10-CM

## 2024-05-20 RX ORDER — SERTRALINE HYDROCHLORIDE 100 MG/1
150 TABLET, FILM COATED ORAL NIGHTLY
Qty: 135 TABLET | Refills: 1 | Status: SHIPPED | OUTPATIENT
Start: 2024-05-20

## 2024-05-20 RX ORDER — MELOXICAM 7.5 MG/1
7.5 TABLET ORAL DAILY
Qty: 30 TABLET | Refills: 1 | Status: SHIPPED | OUTPATIENT
Start: 2024-05-20
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
## 2024-06-13 ENCOUNTER — OFFICE VISIT (OUTPATIENT)
Dept: PRIMARY CARE CLINIC | Age: 84
End: 2024-06-13
Payer: MEDICARE

## 2024-06-13 VITALS
OXYGEN SATURATION: 96 % | BODY MASS INDEX: 25.86 KG/M2 | HEART RATE: 73 BPM | WEIGHT: 140.5 LBS | DIASTOLIC BLOOD PRESSURE: 68 MMHG | HEIGHT: 62 IN | SYSTOLIC BLOOD PRESSURE: 138 MMHG

## 2024-06-13 DIAGNOSIS — R53.1 WEAKNESS GENERALIZED: Primary | ICD-10-CM

## 2024-06-13 DIAGNOSIS — N30.00 ACUTE CYSTITIS WITHOUT HEMATURIA: ICD-10-CM

## 2024-06-13 DIAGNOSIS — G30.1 MODERATE LATE ONSET ALZHEIMER'S DEMENTIA WITH MOOD DISTURBANCE (HCC): ICD-10-CM

## 2024-06-13 DIAGNOSIS — S23.41XA RIB SPRAIN, INITIAL ENCOUNTER: ICD-10-CM

## 2024-06-13 DIAGNOSIS — F02.B3 MODERATE LATE ONSET ALZHEIMER'S DEMENTIA WITH MOOD DISTURBANCE (HCC): ICD-10-CM

## 2024-06-13 LAB
APPEARANCE FLUID: NORMAL
BILIRUBIN, POC: NORMAL
BLOOD URINE, POC: NORMAL
CLARITY, POC: NORMAL
COLOR, POC: YELLOW
GLUCOSE URINE, POC: NORMAL
KETONES, POC: NORMAL
LEUKOCYTE EST, POC: NORMAL
NITRITE, POC: NEGATIVE
PH, POC: 5.5
PROTEIN, POC: NORMAL
SPECIFIC GRAVITY, POC: 1.03
UROBILINOGEN, POC: 0.2

## 2024-06-13 PROCEDURE — 81002 URINALYSIS NONAUTO W/O SCOPE: CPT | Performed by: FAMILY MEDICINE

## 2024-06-13 PROCEDURE — 3075F SYST BP GE 130 - 139MM HG: CPT | Performed by: FAMILY MEDICINE

## 2024-06-13 PROCEDURE — 99213 OFFICE O/P EST LOW 20 MIN: CPT | Performed by: FAMILY MEDICINE

## 2024-06-13 PROCEDURE — 1123F ACP DISCUSS/DSCN MKR DOCD: CPT | Performed by: FAMILY MEDICINE

## 2024-06-13 PROCEDURE — 3078F DIAST BP <80 MM HG: CPT | Performed by: FAMILY MEDICINE

## 2024-06-13 RX ORDER — NAPROXEN 500 MG/1
500 TABLET ORAL 2 TIMES DAILY WITH MEALS
Qty: 60 TABLET | Refills: 2 | Status: SHIPPED | OUTPATIENT
Start: 2024-06-13

## 2024-06-13 RX ORDER — NITROFURANTOIN 25; 75 MG/1; MG/1
100 CAPSULE ORAL 2 TIMES DAILY
Qty: 14 CAPSULE | Refills: 0 | Status: SHIPPED | OUTPATIENT
Start: 2024-06-13 | End: 2024-06-20

## 2024-06-13 NOTE — PROGRESS NOTES
Maryellen Zarate, a female of 83 y.o. came to the office 6/13/2024.     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 The current episode started in the past 7 days. Pertinent negatives include no hematuria or pain.   Fall  The accident occurred More than 1 week ago. Pertinent negatives include no hematuria. She has tried NSAID for the symptoms.   Fatigue  This is a recurrent problem. The current episode started more than 1 month ago. Associated symptoms include fatigue, myalgias and weakness (legs).        Allergies   Allergen Reactions    Aspirin Swelling and Other (See Comments)     Lips swelling    Penicillins      Unsure of reaction    Bactrim [Sulfamethoxazole-Trimethoprim]      Had terrible shaking       Current Outpatient Medications on File Prior to Visit   Medication Sig Dispense Refill    meloxicam (MOBIC) 7.5 MG tablet Take 1 tablet by mouth daily 30 tablet 1

## 2024-06-18 DIAGNOSIS — S01.01XS SCALP LACERATION, SEQUELA: ICD-10-CM

## 2024-06-18 RX ORDER — HYDROXYZINE PAMOATE 50 MG/1
CAPSULE ORAL
Qty: 60 CAPSULE | Refills: 2 | Status: SHIPPED | OUTPATIENT
Start: 2024-06-18

## 2024-07-01 ENCOUNTER — TELEPHONE (OUTPATIENT)
Dept: PRIMARY CARE CLINIC | Age: 84
End: 2024-07-01

## 2024-07-01 DIAGNOSIS — M54.50 ACUTE LOW BACK PAIN WITHOUT SCIATICA, UNSPECIFIED BACK PAIN LATERALITY: ICD-10-CM

## 2024-07-01 DIAGNOSIS — M54.6 ACUTE THORACIC BACK PAIN, UNSPECIFIED BACK PAIN LATERALITY: Primary | ICD-10-CM

## 2024-07-01 NOTE — TELEPHONE ENCOUNTER
Pt daughter calling asking for a return call regarding pt back pain she is currently taking naproxen and it doesn't seem to be helping pt is complaining about pain a lot and not moving around much.

## 2024-07-02 NOTE — TELEPHONE ENCOUNTER
Spoke with patient's daughter yesterday.  Her mom is complaining of back pain continuously.  We have had some falls.  I will x-ray her thoracic and lumbar sacral spine to rule out any type of compression fractures.

## 2024-07-03 ENCOUNTER — HOSPITAL ENCOUNTER (OUTPATIENT)
Age: 84
Discharge: HOME | End: 2024-07-05
Payer: MEDICARE

## 2024-07-03 ENCOUNTER — HOSPITAL ENCOUNTER (OUTPATIENT)
Dept: GENERAL RADIOLOGY | Age: 84
Discharge: HOME | End: 2024-07-05
Payer: MEDICARE

## 2024-07-03 DIAGNOSIS — M54.50 ACUTE LOW BACK PAIN WITHOUT SCIATICA, UNSPECIFIED BACK PAIN LATERALITY: ICD-10-CM

## 2024-07-03 DIAGNOSIS — M54.6 ACUTE THORACIC BACK PAIN, UNSPECIFIED BACK PAIN LATERALITY: ICD-10-CM

## 2024-07-03 PROCEDURE — 72110 X-RAY EXAM L-2 SPINE 4/>VWS: CPT

## 2024-07-03 PROCEDURE — 72072 X-RAY EXAM THORAC SPINE 3VWS: CPT

## 2024-07-05 ENCOUNTER — HOSPITAL ENCOUNTER (EMERGENCY)
Age: 84
Discharge: ANOTHER HOSPITAL | End: 2024-07-06
Attending: EMERGENCY MEDICINE
Payer: MEDICARE

## 2024-07-05 ENCOUNTER — APPOINTMENT (OUTPATIENT)
Dept: CT IMAGING | Age: 84
End: 2024-07-05
Payer: MEDICARE

## 2024-07-05 DIAGNOSIS — S22.42XA CLOSED FRACTURE OF MULTIPLE RIBS OF LEFT SIDE, INITIAL ENCOUNTER: Primary | ICD-10-CM

## 2024-07-05 DIAGNOSIS — R29.6 MULTIPLE FALLS: ICD-10-CM

## 2024-07-05 DIAGNOSIS — S32.010A CLOSED COMPRESSION FRACTURE OF BODY OF L1 VERTEBRA (HCC): ICD-10-CM

## 2024-07-05 LAB
ALBUMIN SERPL-MCNC: 4.2 G/DL (ref 3.5–5.2)
ALP SERPL-CCNC: 139 U/L (ref 35–104)
ALT SERPL-CCNC: 11 U/L (ref 0–32)
ANION GAP SERPL CALCULATED.3IONS-SCNC: 11 MMOL/L (ref 7–16)
AST SERPL-CCNC: 19 U/L (ref 0–31)
BASOPHILS # BLD: 0.07 K/UL (ref 0–0.2)
BASOPHILS NFR BLD: 1 % (ref 0–2)
BILIRUB SERPL-MCNC: 0.3 MG/DL (ref 0–1.2)
BUN SERPL-MCNC: 21 MG/DL (ref 6–23)
CALCIUM SERPL-MCNC: 8.9 MG/DL (ref 8.6–10.2)
CHLORIDE SERPL-SCNC: 102 MMOL/L (ref 98–107)
CK SERPL-CCNC: 79 U/L (ref 20–180)
CO2 SERPL-SCNC: 28 MMOL/L (ref 22–29)
CREAT SERPL-MCNC: 0.9 MG/DL (ref 0.5–1)
EKG ATRIAL RATE: 72 BPM
EKG P AXIS: 56 DEGREES
EKG P-R INTERVAL: 154 MS
EKG Q-T INTERVAL: 398 MS
EKG QRS DURATION: 74 MS
EKG QTC CALCULATION (BAZETT): 435 MS
EKG R AXIS: -7 DEGREES
EKG T AXIS: 47 DEGREES
EKG VENTRICULAR RATE: 72 BPM
EOSINOPHIL # BLD: 0.27 K/UL (ref 0.05–0.5)
EOSINOPHILS RELATIVE PERCENT: 4 % (ref 0–6)
ERYTHROCYTE [DISTWIDTH] IN BLOOD BY AUTOMATED COUNT: 13.6 % (ref 11.5–15)
GFR, ESTIMATED: 59 ML/MIN/1.73M2
GLUCOSE SERPL-MCNC: 118 MG/DL (ref 74–99)
HCT VFR BLD AUTO: 37.6 % (ref 34–48)
HGB BLD-MCNC: 12.4 G/DL (ref 11.5–15.5)
IMM GRANULOCYTES # BLD AUTO: 0.04 K/UL (ref 0–0.58)
IMM GRANULOCYTES NFR BLD: 1 % (ref 0–5)
INR PPP: 1.1
LACTATE BLDV-SCNC: 1.2 MMOL/L (ref 0.5–2.2)
LIPASE SERPL-CCNC: 29 U/L (ref 13–60)
LYMPHOCYTES NFR BLD: 1.78 K/UL (ref 1.5–4)
LYMPHOCYTES RELATIVE PERCENT: 27 % (ref 20–42)
MCH RBC QN AUTO: 28.4 PG (ref 26–35)
MCHC RBC AUTO-ENTMCNC: 33 G/DL (ref 32–34.5)
MCV RBC AUTO: 86 FL (ref 80–99.9)
MONOCYTES NFR BLD: 0.77 K/UL (ref 0.1–0.95)
MONOCYTES NFR BLD: 12 % (ref 2–12)
NEUTROPHILS NFR BLD: 55 % (ref 43–80)
NEUTS SEG NFR BLD: 3.61 K/UL (ref 1.8–7.3)
PLATELET # BLD AUTO: 228 K/UL (ref 130–450)
PMV BLD AUTO: 8.5 FL (ref 7–12)
POTASSIUM SERPL-SCNC: 3.7 MMOL/L (ref 3.5–5)
PROT SERPL-MCNC: 7.3 G/DL (ref 6.4–8.3)
PROTHROMBIN TIME: 11.4 SEC (ref 9.3–12.4)
RBC # BLD AUTO: 4.37 M/UL (ref 3.5–5.5)
SODIUM SERPL-SCNC: 141 MMOL/L (ref 132–146)
WBC OTHER # BLD: 6.5 K/UL (ref 4.5–11.5)

## 2024-07-05 PROCEDURE — 6360000002 HC RX W HCPCS: Performed by: EMERGENCY MEDICINE

## 2024-07-05 PROCEDURE — 87086 URINE CULTURE/COLONY COUNT: CPT

## 2024-07-05 PROCEDURE — 81001 URINALYSIS AUTO W/SCOPE: CPT

## 2024-07-05 PROCEDURE — 6360000004 HC RX CONTRAST MEDICATION: Performed by: RADIOLOGY

## 2024-07-05 PROCEDURE — 93005 ELECTROCARDIOGRAM TRACING: CPT

## 2024-07-05 PROCEDURE — 72131 CT LUMBAR SPINE W/O DYE: CPT

## 2024-07-05 PROCEDURE — 96375 TX/PRO/DX INJ NEW DRUG ADDON: CPT

## 2024-07-05 PROCEDURE — 83605 ASSAY OF LACTIC ACID: CPT

## 2024-07-05 PROCEDURE — 96376 TX/PRO/DX INJ SAME DRUG ADON: CPT

## 2024-07-05 PROCEDURE — 85610 PROTHROMBIN TIME: CPT

## 2024-07-05 PROCEDURE — 74178 CT ABD&PLV WO CNTR FLWD CNTR: CPT

## 2024-07-05 PROCEDURE — 99285 EMERGENCY DEPT VISIT HI MDM: CPT

## 2024-07-05 PROCEDURE — 83690 ASSAY OF LIPASE: CPT

## 2024-07-05 PROCEDURE — 71250 CT THORAX DX C-: CPT

## 2024-07-05 PROCEDURE — 96374 THER/PROPH/DIAG INJ IV PUSH: CPT

## 2024-07-05 PROCEDURE — 96372 THER/PROPH/DIAG INJ SC/IM: CPT

## 2024-07-05 PROCEDURE — 80053 COMPREHEN METABOLIC PANEL: CPT

## 2024-07-05 PROCEDURE — 82550 ASSAY OF CK (CPK): CPT

## 2024-07-05 PROCEDURE — 70450 CT HEAD/BRAIN W/O DYE: CPT

## 2024-07-05 PROCEDURE — 85025 COMPLETE CBC W/AUTO DIFF WBC: CPT

## 2024-07-05 RX ORDER — MORPHINE SULFATE 2 MG/ML
2 INJECTION, SOLUTION INTRAMUSCULAR; INTRAVENOUS ONCE
Status: COMPLETED | OUTPATIENT
Start: 2024-07-05 | End: 2024-07-05

## 2024-07-05 RX ORDER — MORPHINE SULFATE 2 MG/ML
2 INJECTION, SOLUTION INTRAMUSCULAR; INTRAVENOUS ONCE
Status: COMPLETED | OUTPATIENT
Start: 2024-07-06 | End: 2024-07-06

## 2024-07-05 RX ADMIN — IOPAMIDOL 75 ML: 755 INJECTION, SOLUTION INTRAVENOUS at 22:14

## 2024-07-05 RX ADMIN — MORPHINE SULFATE 2 MG: 2 INJECTION, SOLUTION INTRAMUSCULAR; INTRAVENOUS at 21:21

## 2024-07-05 ASSESSMENT — PAIN SCALES - GENERAL: PAINLEVEL_OUTOF10: 5

## 2024-07-05 ASSESSMENT — PAIN - FUNCTIONAL ASSESSMENT: PAIN_FUNCTIONAL_ASSESSMENT: 0-10

## 2024-07-05 NOTE — ED NOTES
Department of Emergency Medicine    FIRST PROVIDER TRIAGE NOTE             Independent MLP           7/5/24  2:59 PM EDT    Date of Encounter: 7/5/24   MRN: [id number]    There were no vitals filed for this visit.     HPI: Maryellen Zarate is a 83 y.o. female who presents to the ED for Back Pain (Left side, fell 8 times in the past 2 months, hx dementia )   2 falls within the last two days.  She lives at home with home health care  Had a xray completed as outpatient 7/3    ROS: Negative for urinary or bowel complaints.    Physical Exam:   Gen Appearance/Constitutional: alert  Musculoskeletal: moves all extremities x 4, sitting in wheelchair     Initial Plan of Care: All treatment areas with department are currently occupied.     Plan to order/Initiate the following while awaiting opening in ED: Triage evaluation  .    Provider-Patient relationship only established for Provider In Triage (PIT).  Full assessment, HPI and examination not performed, therefore, it is not yet possible to state whether or not an emergency medical condition exists    Initial Plan of Care: Initiate Treatment-Testing, Proceed toTreatment Area When Bed Available for ED Attending/MLP to Continue Care  Secondary to high volume, low staffing, and/or boarding- patient to await bed availability.    This ends my PIT-Patient relationship.  Care of patient relinquished after triage    Electronically signed by JONNATHAN Cota CNP   DD: 7/5/24       Ingrid Jack APRN - CNP  07/05/24 3647

## 2024-07-06 ENCOUNTER — HOSPITAL ENCOUNTER (INPATIENT)
Age: 84
LOS: 5 days | Discharge: HOME | End: 2024-07-11
Attending: INTERNAL MEDICINE | Admitting: INTERNAL MEDICINE
Payer: MEDICARE

## 2024-07-06 VITALS
WEIGHT: 140 LBS | TEMPERATURE: 98 F | DIASTOLIC BLOOD PRESSURE: 74 MMHG | BODY MASS INDEX: 26.43 KG/M2 | SYSTOLIC BLOOD PRESSURE: 127 MMHG | OXYGEN SATURATION: 98 % | HEART RATE: 78 BPM | RESPIRATION RATE: 18 BRPM | HEIGHT: 61 IN

## 2024-07-06 DIAGNOSIS — S32.018A OTHER FRACTURE OF FIRST LUMBAR VERTEBRA, INITIAL ENCOUNTER FOR CLOSED FRACTURE (HCC): Primary | ICD-10-CM

## 2024-07-06 PROBLEM — S32.010A CLOSED COMPRESSION FRACTURE OF BODY OF L1 VERTEBRA (HCC): Status: ACTIVE | Noted: 2024-07-06

## 2024-07-06 LAB
BACTERIA URNS QL MICRO: ABNORMAL
BILIRUB UR QL STRIP: NEGATIVE
CLARITY UR: CLEAR
COLOR UR: YELLOW
EPI CELLS #/AREA URNS HPF: ABNORMAL /HPF
GLUCOSE UR STRIP-MCNC: NEGATIVE MG/DL
HGB UR QL STRIP.AUTO: ABNORMAL
KETONES UR STRIP-MCNC: NEGATIVE MG/DL
LEUKOCYTE ESTERASE UR QL STRIP: ABNORMAL
NITRITE UR QL STRIP: NEGATIVE
PH UR STRIP: 6 [PH] (ref 5–9)
PROT UR STRIP-MCNC: NEGATIVE MG/DL
RBC #/AREA URNS HPF: ABNORMAL /HPF
SP GR UR STRIP: 1.01 (ref 1–1.03)
UROBILINOGEN UR STRIP-ACNC: 0.2 EU/DL (ref 0–1)
WBC #/AREA URNS HPF: ABNORMAL /HPF

## 2024-07-06 PROCEDURE — 6370000000 HC RX 637 (ALT 250 FOR IP): Performed by: INTERNAL MEDICINE

## 2024-07-06 PROCEDURE — 96374 THER/PROPH/DIAG INJ IV PUSH: CPT

## 2024-07-06 PROCEDURE — 99222 1ST HOSP IP/OBS MODERATE 55: CPT | Performed by: NEUROLOGICAL SURGERY

## 2024-07-06 PROCEDURE — 96375 TX/PRO/DX INJ NEW DRUG ADDON: CPT

## 2024-07-06 PROCEDURE — 6360000002 HC RX W HCPCS: Performed by: EMERGENCY MEDICINE

## 2024-07-06 PROCEDURE — 96376 TX/PRO/DX INJ SAME DRUG ADON: CPT

## 2024-07-06 PROCEDURE — 1200000000 HC SEMI PRIVATE

## 2024-07-06 PROCEDURE — 96372 THER/PROPH/DIAG INJ SC/IM: CPT

## 2024-07-06 RX ORDER — HALOPERIDOL 5 MG/ML
2.5 INJECTION INTRAMUSCULAR ONCE
Status: COMPLETED | OUTPATIENT
Start: 2024-07-06 | End: 2024-07-06

## 2024-07-06 RX ORDER — LORAZEPAM 2 MG/ML
0.5 INJECTION INTRAMUSCULAR
Status: COMPLETED | OUTPATIENT
Start: 2024-07-06 | End: 2024-07-06

## 2024-07-06 RX ORDER — HYDROXYZINE PAMOATE 50 MG/1
50 CAPSULE ORAL 3 TIMES DAILY PRN
Status: DISCONTINUED | OUTPATIENT
Start: 2024-07-06 | End: 2024-07-11 | Stop reason: HOSPADM

## 2024-07-06 RX ORDER — MELOXICAM 7.5 MG/1
7.5 TABLET ORAL DAILY
Status: DISCONTINUED | OUTPATIENT
Start: 2024-07-06 | End: 2024-07-11 | Stop reason: HOSPADM

## 2024-07-06 RX ORDER — ONDANSETRON 2 MG/ML
4 INJECTION INTRAMUSCULAR; INTRAVENOUS EVERY 6 HOURS PRN
Status: DISCONTINUED | OUTPATIENT
Start: 2024-07-06 | End: 2024-07-11 | Stop reason: HOSPADM

## 2024-07-06 RX ORDER — HYDROCODONE BITARTRATE AND ACETAMINOPHEN 5; 325 MG/1; MG/1
1 TABLET ORAL EVERY 6 HOURS PRN
Status: DISCONTINUED | OUTPATIENT
Start: 2024-07-06 | End: 2024-07-11 | Stop reason: HOSPADM

## 2024-07-06 RX ORDER — MECOBALAMIN 5000 MCG
5 TABLET,DISINTEGRATING ORAL NIGHTLY
Status: DISCONTINUED | OUTPATIENT
Start: 2024-07-06 | End: 2024-07-11 | Stop reason: HOSPADM

## 2024-07-06 RX ORDER — DOCUSATE SODIUM 100 MG/1
100 CAPSULE, LIQUID FILLED ORAL NIGHTLY
Status: DISCONTINUED | OUTPATIENT
Start: 2024-07-06 | End: 2024-07-10

## 2024-07-06 RX ORDER — CEFDINIR 300 MG/1
300 CAPSULE ORAL EVERY 12 HOURS SCHEDULED
Status: DISCONTINUED | OUTPATIENT
Start: 2024-07-07 | End: 2024-07-10

## 2024-07-06 RX ORDER — MORPHINE SULFATE 4 MG/ML
4 INJECTION, SOLUTION INTRAMUSCULAR; INTRAVENOUS ONCE
Status: COMPLETED | OUTPATIENT
Start: 2024-07-06 | End: 2024-07-06

## 2024-07-06 RX ADMIN — MELOXICAM 7.5 MG: 7.5 TABLET ORAL at 19:05

## 2024-07-06 RX ADMIN — HYDROCODONE BITARTRATE AND ACETAMINOPHEN 1 TABLET: 5; 325 TABLET ORAL at 18:51

## 2024-07-06 RX ADMIN — MORPHINE SULFATE 2 MG: 2 INJECTION, SOLUTION INTRAMUSCULAR; INTRAVENOUS at 00:11

## 2024-07-06 RX ADMIN — Medication 5 MG: at 21:40

## 2024-07-06 RX ADMIN — DOCUSATE SODIUM 100 MG: 100 CAPSULE, LIQUID FILLED ORAL at 21:40

## 2024-07-06 RX ADMIN — LORAZEPAM 0.5 MG: 2 INJECTION INTRAMUSCULAR; INTRAVENOUS at 08:42

## 2024-07-06 RX ADMIN — SERTRALINE 150 MG: 50 TABLET, FILM COATED ORAL at 21:40

## 2024-07-06 RX ADMIN — HYDROXYZINE PAMOATE 50 MG: 50 CAPSULE ORAL at 23:54

## 2024-07-06 RX ADMIN — MORPHINE SULFATE 4 MG: 4 INJECTION, SOLUTION INTRAMUSCULAR; INTRAVENOUS at 08:41

## 2024-07-06 RX ADMIN — HALOPERIDOL LACTATE 2.5 MG: 5 INJECTION, SOLUTION INTRAMUSCULAR at 02:32

## 2024-07-06 ASSESSMENT — PAIN DESCRIPTION - DESCRIPTORS: DESCRIPTORS: ACHING;DISCOMFORT;POUNDING

## 2024-07-06 ASSESSMENT — PAIN DESCRIPTION - LOCATION
LOCATION: BACK
LOCATION: BACK

## 2024-07-06 ASSESSMENT — PAIN SCALES - GENERAL
PAINLEVEL_OUTOF10: 8
PAINLEVEL_OUTOF10: 10

## 2024-07-06 NOTE — ED PROVIDER NOTES
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 ([facility]) 10/10/2022    Closed fracture of ramus of right pubis ([facility]) 04/15/2023    Urinary incontinence 04/15/2023    Mild dementia with anxiety ([facility]) 04/15/2023    Primary osteoarthritis 04/15/2023    Combined hyperlipidemia 04/15/2023    Chronic hypertension 04/15/2023    Pubic ramus fracture, right, closed, initial encounter ([facility]) 04/15/2023    COVID-19 09/08/2023    Ambulatory dysfunction 09/08/2023    Altered mental status 09/16/2023    Colitis 09/16/2023    Fatigue 09/16/2023    Head injury 05/01/2024    Scalp laceration 05/01/2024    Ataxia 05/01/2024    Acute cystitis without hematuria 05/02/2024     Resolved Ambulatory Problems     Diagnosis Date Noted    HTN (hypertension) 07/30/2013    Hyperlipidemia 07/30/2013    Neck pain     Need for influenza vaccination 10/11/2013    Osteoarthritis 02/19/2014    Acute sinusitis 01/05/2015    Prehypertension 09/05/2017    Fall 05/01/2024     Past Medical History:   Diagnosis Date    Basal cell carcinoma of nose     Breast CA ([facility]) 2001    Constipated     COVID-19 virus infection 09/08/2023    Essential hypertension 09/03/2015    Hypertension          CONSULTS: (Who and What was discussed)  None    Discussion with Other Profesionals : Admitting Team dr wick    Social Determinants : Patient has significant healthcare illiteracy    Records Reviewed : Outpatient Notes PCP dr toussaint    CC/HPI Summary, DDx, ED Course, and Reassessment:   83-year-old female presents for left flank pain rib pain after multiple falls.  Please see HPI above.  Patient has dementia and is poor historian.  Differential diagnosis includes rib fracture pneumothorax splenic  laceration compression fracture to name a few.  Patient was found to have rib fractures 3 through 10 on the left.  She is found to have L1 compression burst fracture.  She did have some mild retropulsion she appears to be neurologically intact in the lower extremities without signs or symptoms of cauda equina.  Patient's daughter is considering kyphoplasty.  Patient does have care at home however they do not appear to be watching her as they should for she has fallen multiple times recently.  Case discussed with patient's daughter.  Patient will be admitted to [facility] for neurospinal evaluation PT OT social work.  Discussed with hospitalist who will admit    Disposition Considerations (tests considered but not done, Shared Decision Making, Pt Expectation of Test or Tx.):   admit      I am the Primary Clinician of Record.    FINAL IMPRESSION      1. Closed fracture of multiple ribs of left side, initial encounter    2. Closed compression fracture of body of L1 vertebra (HCC)    3. Multiple falls          DISPOSITION/PLAN     DISPOSITION Decision To Transfer 07/05/2024 11:56:49 PM      PATIENT REFERRED TO:  No follow-up provider specified.    DISCHARGE MEDICATIONS:  New Prescriptions    No medications on file       DISCONTINUED MEDICATIONS:  Discontinued Medications    No medications on file              (Please note that portions of this note were completed with a voice recognition program.  Efforts were made to edit the dictations but occasionally words are mis-transcribed.)    Jefry Farooq DO (electronically signed)           Jefry Farooq DO  07/06/24 0143

## 2024-07-06 NOTE — ED NOTES
Name: Maryellen Zarate  : 1940  MRN: [id number]    Date: 2024    Benefits of immediately proceeding with Radiology exam outweigh the risks and therefore the following is being waived:      [] Pregnancy test    [] Protocol for Iodine allergy    [] MRI questionnaire    [x] BUN/Creatinine        DO Capri Echols Joseph A, DO  24

## 2024-07-07 PROBLEM — S32.018A: Status: ACTIVE | Noted: 2024-07-07

## 2024-07-07 LAB
MICROORGANISM SPEC CULT: NO GROWTH
SERVICE CMNT-IMP: NORMAL
SPECIMEN DESCRIPTION: NORMAL

## 2024-07-07 PROCEDURE — 1200000000 HC SEMI PRIVATE

## 2024-07-07 PROCEDURE — 6370000000 HC RX 637 (ALT 250 FOR IP): Performed by: INTERNAL MEDICINE

## 2024-07-07 PROCEDURE — 6360000002 HC RX W HCPCS: Performed by: INTERNAL MEDICINE

## 2024-07-07 RX ORDER — LORAZEPAM 2 MG/ML
0.5 INJECTION INTRAMUSCULAR EVERY 6 HOURS PRN
Status: DISCONTINUED | OUTPATIENT
Start: 2024-07-07 | End: 2024-07-08

## 2024-07-07 RX ADMIN — CEFDINIR 300 MG: 300 CAPSULE ORAL at 11:45

## 2024-07-07 RX ADMIN — CEFDINIR 300 MG: 300 CAPSULE ORAL at 22:11

## 2024-07-07 RX ADMIN — LORAZEPAM 0.5 MG: 2 INJECTION INTRAMUSCULAR; INTRAVENOUS at 23:09

## 2024-07-07 RX ADMIN — HYDROCODONE BITARTRATE AND ACETAMINOPHEN 1 TABLET: 5; 325 TABLET ORAL at 11:43

## 2024-07-07 RX ADMIN — Medication 5 MG: at 22:10

## 2024-07-07 RX ADMIN — HYDROXYZINE PAMOATE 50 MG: 50 CAPSULE ORAL at 22:09

## 2024-07-07 RX ADMIN — SERTRALINE 150 MG: 50 TABLET, FILM COATED ORAL at 22:09

## 2024-07-07 RX ADMIN — LORAZEPAM 0.5 MG: 2 INJECTION INTRAMUSCULAR; INTRAVENOUS at 02:23

## 2024-07-07 RX ADMIN — MELOXICAM 7.5 MG: 7.5 TABLET ORAL at 11:44

## 2024-07-07 RX ADMIN — HYDROCODONE BITARTRATE AND ACETAMINOPHEN 1 TABLET: 5; 325 TABLET ORAL at 17:47

## 2024-07-07 RX ADMIN — DOCUSATE SODIUM 100 MG: 100 CAPSULE, LIQUID FILLED ORAL at 22:08

## 2024-07-07 RX ADMIN — HYDROCODONE BITARTRATE AND ACETAMINOPHEN 1 TABLET: 5; 325 TABLET ORAL at 00:48

## 2024-07-07 ASSESSMENT — PAIN SCALES - GENERAL
PAINLEVEL_OUTOF10: 0
PAINLEVEL_OUTOF10: 10
PAINLEVEL_OUTOF10: 5
PAINLEVEL_OUTOF10: 4
PAINLEVEL_OUTOF10: 8

## 2024-07-07 ASSESSMENT — PAIN DESCRIPTION - LOCATION
LOCATION: BACK
LOCATION: BACK

## 2024-07-07 ASSESSMENT — PAIN DESCRIPTION - ONSET
ONSET: ON-GOING
ONSET: ON-GOING

## 2024-07-07 ASSESSMENT — PAIN - FUNCTIONAL ASSESSMENT
PAIN_FUNCTIONAL_ASSESSMENT: PREVENTS OR INTERFERES SOME ACTIVE ACTIVITIES AND ADLS
PAIN_FUNCTIONAL_ASSESSMENT: PREVENTS OR INTERFERES SOME ACTIVE ACTIVITIES AND ADLS

## 2024-07-07 ASSESSMENT — PAIN DESCRIPTION - ORIENTATION
ORIENTATION: POSTERIOR
ORIENTATION: POSTERIOR

## 2024-07-07 ASSESSMENT — PAIN DESCRIPTION - DESCRIPTORS
DESCRIPTORS: ACHING;DISCOMFORT;CRAMPING
DESCRIPTORS: ACHING;DISCOMFORT;CRAMPING

## 2024-07-07 ASSESSMENT — PAIN DESCRIPTION - PAIN TYPE
TYPE: ACUTE PAIN
TYPE: ACUTE PAIN

## 2024-07-07 ASSESSMENT — PAIN DESCRIPTION - FREQUENCY
FREQUENCY: INTERMITTENT
FREQUENCY: INTERMITTENT

## 2024-07-07 NOTE — PLAN OF CARE
Problem: Discharge Planning  Goal: Discharge to home or other facility with appropriate resources  Outcome: Progressing     Problem: Skin/Tissue Integrity  Goal: Absence of new skin breakdown  Description: 1.  Monitor for areas of redness and/or skin breakdown  2.  Assess vascular access sites hourly  3.  Every 4-6 hours minimum:  Change oxygen saturation probe site  4.  Every 4-6 hours:  If on nasal continuous positive airway pressure, respiratory therapy assess nares and determine need for appliance change or resting period.  Outcome: Completed     Problem: Safety - Adult  Goal: Free from fall injury  Outcome: Completed     Problem: ABCDS Injury Assessment  Goal: Absence of physical injury  Outcome: Completed

## 2024-07-07 NOTE — CONSULTS
[facility]              [address] [place] [address]                              CONSULTATION      PATIENT NAME: OCTAVIA CORDOVA          : 1940  MED REC NO: [id number]                        ROOM: [id number]  ACCOUNT NO: [id number]                       ADMIT DATE: 2024  PROVIDER: Dulce Mayfield MD    NEUROSURGERY CONSULTATION    CONSULT DATE: 2024    REFERRING PHYSICIAN:  MARK HERNANDEZ      REASON FOR CONSULT:  L1 compression fracture.    HISTORY OF PRESENT ILLNESS:  The patient is an 83-year-old lady who has had multiple falls in the last 2 months.  She does have a history of dementia.  She is complaining of back pain, rib pain and also pelvic pain.  Pain is made worse with activity, better at rest.  She denies any new numbness, tingling, or weakness; or loss of control of bowel or bladder function.  Interviewing examination is difficult to complete because of the patient's baseline underlying dementia and history is obtained from the medical record.    PAST MEDICAL HISTORY:  Positive for allergic rhinitis, alopecia, anxiety, basal cell carcinoma, breast cancer, hypertension, hyperlipidemia, osteoarthritis, osteopenia.    PAST SURGICAL HISTORY:  Positive for appendectomy, breast lumpectomy, hysterectomy, and right hip surgery.    FAMILY HISTORY:  Noncontributory.    SOCIAL HISTORY:  Negative for tobacco or alcohol use.    ALLERGIES:  INCLUDE ASPIRIN, PENICILLIN, AND BACTRIM.      HOME MEDICATIONS:  Include Vistaril, Mobic, and Zoloft.    REVIEW OF SYSTEMS:  I am unable to complete a 14-point review of systems because of the patient's current medical condition.    PHYSICAL EXAMINATION:  VITAL SIGNS:  She is currently afebrile with a T-current of 36.2 degrees Celsius, pulse 72, blood pressure is 165/74.    GENERAL:  She is resting in bed, appears to be in mild-to-moderate distress secondary to pain.  She appears her stated age.  HEENT:

## 2024-07-07 NOTE — PROGRESS NOTES
Called in consult to [facility]. TLSO brace to be brought in to pt in morning. Paperwork faxed.

## 2024-07-07 NOTE — PROGRESS NOTES
4 Eyes Skin Assessment     NAME:  Maryellen Zarate  YOB: 1940  MEDICAL RECORD NUMBER:  [id number]    The patient is being assessed for  Admission    I agree that at least one RN has performed a thorough Head to Toe Skin Assessment on the patient. ALL assessment sites listed below have been assessed.      Areas assessed by both nurses:    Head, Face, Ears, Shoulders, Back, Chest, Arms, Elbows, Hands, Sacrum. Buttock, Coccyx, Ischium, Legs. Feet and Heels, and Under Medical Devices         Does the Patient have a Wound? No noted wound(s)       Yonas Prevention initiated by RN: Yes  Wound Care Orders initiated by RN: No    Pressure Injury (Stage 3,4, Unstageable, DTI, NWPT, and Complex wounds) if present, place Wound referral order by RN under : No    BUE/RLE ecchymosis   RLE old scar  Varicose veins     New Ostomies, if present place, Ostomy referral order under : No     Nurse 1 eSignature: Electronically signed by Aaron Collier RN on 7/7/24 at 7:35 AM EDT    **SHARE this note so that the co-signing nurse can place an eSignature**    Nurse 2 eSignature: {Esignature:[id number]}

## 2024-07-07 NOTE — PROGRESS NOTES
Tried calling Dr. Blake answering service x2, no answer at this time. Patient brought in a home medication Omnicef for UTI. Still needs verified by the pharmacist.

## 2024-07-07 NOTE — H&P
[facility]  History and Physical      CHIEF COMPLAINT: Back pain after fall    Reason for Admission: L1 fracture    History Obtained From: Patient's caregiver    PCP :  Jose Pastor DO    [address] / [place] [address]      HISTORY OF PRESENT ILLNESS:      The patient is a 83 y.o. female presents to the emergency room with fall with back pain.  Patient apparently has been falling a lot.  She was noted to have L1 fracture.  She was then transported to our institution for neurosurgical evaluation.  She does have around-the-clock caregivers.  She has underlying history of dementia.  He has a remote history of breast cancer.  Hemoglobin emergency room patient was noted to have L1 fracture.  Otherwise workup revealed fractures there is seem to be subacute/chronic to the left third through 10th ribs.  At the time of questioning patient is denying any pain.    Past Medical History:        Diagnosis Date    Allergic rhinitis     Alopecia of scalp 09/03/2015    Anxiety 09/03/2014    Basal cell carcinoma of nose     left side    Breast CA (HCC) 2001    right lumpectomy; DO NOT USE FOR BP,IV,VENIPUNVTURES PER PT    Constipated     postop hip replacement, impacted stool    COVID-19 virus infection 09/08/2023    Episodic low back pain     MRI 3/2010 normal    Essential hypertension 09/03/2015    Hyperlipidemia     Hypertension     pt states well controlled    Neck pain     Osteoarthritis 10/08/2014    Right knee on x-ray - B    Osteoarthritis of both hips     Osteopenia     Urinary, incontinence, stress female 08/27/2015    Follows with Dr Rodriguez. Advised surgery or pessary.     Past Surgical History:        Procedure Laterality Date    APPENDECTOMY      child    BREAST LUMPECTOMY  04/2000    COLONOSCOPY  2000    HYSTERECTOMY (CERVIX STATUS UNKNOWN)  1979    [facility]    JOINT REPLACEMENT Right 10/01/2012     Right Hip - [facility]    OTHER SURGICAL HISTORY Left 10/18/2013    full thickness

## 2024-07-08 PROCEDURE — 97530 THERAPEUTIC ACTIVITIES: CPT

## 2024-07-08 PROCEDURE — 6360000002 HC RX W HCPCS: Performed by: INTERNAL MEDICINE

## 2024-07-08 PROCEDURE — 97165 OT EVAL LOW COMPLEX 30 MIN: CPT

## 2024-07-08 PROCEDURE — 6370000000 HC RX 637 (ALT 250 FOR IP): Performed by: INTERNAL MEDICINE

## 2024-07-08 PROCEDURE — 97161 PT EVAL LOW COMPLEX 20 MIN: CPT

## 2024-07-08 PROCEDURE — 1200000000 HC SEMI PRIVATE

## 2024-07-08 PROCEDURE — 97535 SELF CARE MNGMENT TRAINING: CPT

## 2024-07-08 PROCEDURE — L0464 TLSO 4MOD SACRO-SCAP PRE: HCPCS

## 2024-07-08 RX ORDER — LORAZEPAM 2 MG/ML
0.5 INJECTION INTRAMUSCULAR NIGHTLY PRN
Status: DISCONTINUED | OUTPATIENT
Start: 2024-07-08 | End: 2024-07-10

## 2024-07-08 RX ADMIN — SERTRALINE 150 MG: 50 TABLET, FILM COATED ORAL at 20:05

## 2024-07-08 RX ADMIN — Medication 5 MG: at 20:04

## 2024-07-08 RX ADMIN — LORAZEPAM 0.5 MG: 2 INJECTION INTRAMUSCULAR; INTRAVENOUS at 09:50

## 2024-07-08 RX ADMIN — HYDROCODONE BITARTRATE AND ACETAMINOPHEN 1 TABLET: 5; 325 TABLET ORAL at 18:14

## 2024-07-08 RX ADMIN — HYDROCODONE BITARTRATE AND ACETAMINOPHEN 1 TABLET: 5; 325 TABLET ORAL at 07:42

## 2024-07-08 RX ADMIN — CEFDINIR 300 MG: 300 CAPSULE ORAL at 07:41

## 2024-07-08 RX ADMIN — CEFDINIR 300 MG: 300 CAPSULE ORAL at 20:04

## 2024-07-08 RX ADMIN — HYDROXYZINE PAMOATE 50 MG: 50 CAPSULE ORAL at 20:04

## 2024-07-08 RX ADMIN — MELOXICAM 7.5 MG: 7.5 TABLET ORAL at 07:41

## 2024-07-08 RX ADMIN — DOCUSATE SODIUM 100 MG: 100 CAPSULE, LIQUID FILLED ORAL at 20:04

## 2024-07-08 RX ADMIN — HYDROCODONE BITARTRATE AND ACETAMINOPHEN 1 TABLET: 5; 325 TABLET ORAL at 00:43

## 2024-07-08 ASSESSMENT — PAIN DESCRIPTION - ONSET: ONSET: ON-GOING

## 2024-07-08 ASSESSMENT — PAIN DESCRIPTION - ORIENTATION
ORIENTATION: POSTERIOR
ORIENTATION: LEFT
ORIENTATION: POSTERIOR

## 2024-07-08 ASSESSMENT — PAIN DESCRIPTION - DESCRIPTORS
DESCRIPTORS: ACHING;DISCOMFORT;SORE
DESCRIPTORS: ACHING;DISCOMFORT;SORE
DESCRIPTORS: ACHING;SHARP;THROBBING

## 2024-07-08 ASSESSMENT — PAIN SCALES - GENERAL
PAINLEVEL_OUTOF10: 4
PAINLEVEL_OUTOF10: 6
PAINLEVEL_OUTOF10: 8
PAINLEVEL_OUTOF10: 8
PAINLEVEL_OUTOF10: 10

## 2024-07-08 ASSESSMENT — PAIN DESCRIPTION - FREQUENCY: FREQUENCY: INTERMITTENT

## 2024-07-08 ASSESSMENT — PAIN - FUNCTIONAL ASSESSMENT
PAIN_FUNCTIONAL_ASSESSMENT: PREVENTS OR INTERFERES SOME ACTIVE ACTIVITIES AND ADLS

## 2024-07-08 ASSESSMENT — PAIN DESCRIPTION - LOCATION
LOCATION: BACK
LOCATION: BACK
LOCATION: HIP

## 2024-07-08 ASSESSMENT — PAIN DESCRIPTION - PAIN TYPE: TYPE: ACUTE PAIN

## 2024-07-08 NOTE — PROGRESS NOTES
Patient very agitated/crying. Tried getting out of chair, asking for daughter, caregiver asking to give something to help agitation.

## 2024-07-08 NOTE — PLAN OF CARE
Problem: Discharge Planning  Goal: Discharge to home or other facility with appropriate resources  7/8/2024 0344 by Sandi Jenkins RN  Outcome: Progressing  7/7/2024 1820 by Aaron Collier RN  Outcome: Progressing     Problem: Pain  Goal: Verbalizes/displays adequate comfort level or baseline comfort level  7/8/2024 0344 by Sandi Jenkins RN  Outcome: Progressing  7/7/2024 1820 by Aaron Collier RN  Outcome: Progressing     Problem: Safety - Adult  Goal: Free from fall injury  Outcome: Progressing

## 2024-07-08 NOTE — PLAN OF CARE
Problem: Discharge Planning  Goal: Discharge to home or other facility with appropriate resources  Outcome: Progressing     Problem: Pain  Goal: Verbalizes/displays adequate comfort level or baseline comfort level  Outcome: Progressing     Problem: Safety - Adult  Goal: Free from fall injury  Outcome: Completed

## 2024-07-08 NOTE — PROGRESS NOTES
Physical Therapy  Physical Therapy Initial Assessment     Name: Maryellen Zarate  : 1940  MRN: [id number]      Date of Service: 2024    Evaluating PT:  Eboni Barry, PT, DPT, [id number]    Room #:  5216/5216-A  Diagnosis:  Closed compression fracture of body of L1 vertebra (HCC) [S32.010A]  Other fracture of first lumbar vertebra, initial encounter for closed fracture (HCC) [S32.018A]  PMHx/PSHx:    Past Medical History:   Diagnosis Date    Allergic rhinitis     Alopecia of scalp 2015    Anxiety 2014    Basal cell carcinoma of nose     left side    Breast CA (HCC)     right lumpectomy; DO NOT USE FOR BP,IV,VENIPUNVTURES PER PT    Compression fracture of first lumbar vertebra (HCC) 2024    COVID-19 virus infection 2023    Essential hypertension 2015    Hyperlipidemia     Hypertension     pt states well controlled    Osteoarthritis 10/08/2014    Right knee on x-ray - [facility]    Osteoarthritis of both hips     Osteopenia     Urinary, incontinence, stress female 2015    Follows with Dr Rodriguez. Advised surgery or pessary.      Past Surgical History:   Procedure Laterality Date    APPENDECTOMY      child    BREAST LUMPECTOMY  2000    COLONOSCOPY      HYSTERECTOMY (CERVIX STATUS UNKNOWN)  1979    [facility]ON    JOINT REPLACEMENT Right 10/01/2012     Right Hip - [facility]    OTHER SURGICAL HISTORY Left 10/18/2013    full thickness skin graft, repair open nose defect - Dr. Hargrove / Dr. Escamilla      Procedure/Surgery:  none this admission   Precautions:  Fall Risk, TLSO, Spinal Precautions, L 3rd-10th rib fxs, +Alarms, Cognition- h/o dementia  Equipment Needs:  TBD    SUBJECTIVE:    Pt lives alone in a 1 story home with 3 stairs to enter and 2 grab bars.  Bed is on 1 floor and bath is on 1 floor.  Pt ambulated with SPC with assist x1 in community and standard walker PTA. Has  assist at home.    OBJECTIVE:   Initial Evaluation  Date: 24 Treatment Short Term/  fracture of first lumbar vertebra, initial encounter for closed fracture (HCC) [S32.018A]  Specific instructions for next treatment:  Maximize safe and independent functional mobility     Current Treatment Recommendations:     [x] Strengthening to improve independence with functional mobility   [] ROM to improve independence with functional mobility   [x] Balance Training to improve static/dynamic balance and to reduce fall risk  [x] Endurance Training to improve activity tolerance during functional mobility   [x] Transfer Training to improve safety and independence with all functional transfers   [x] Gait Training to improve gait mechanics, endurance and assess need for appropriate assistive device  [x] Stair Training in preparation for safe discharge home and/or into the community   [x] Positioning to prevent skin breakdown and contractures  [x] Safety and Education Training   [x] Patient/Caregiver Education   [] HEP  [x] Other     PT long term treatment goals are located in above grid    Frequency of treatments: 2-5x/week x 1-2 weeks.    Time in  1330  Time out  1403    Total Treatment Time  18 minutes     Evaluation Time includes thorough review of current medical information, gathering information on past medical history/social history and prior level of function, completion of standardized testing/informal observation of tasks, assessment of data and education on plan of care and goals.    CPT codes:  [x] Low Complexity PT evaluation 73713  [] Moderate Complexity PT evaluation 58040  [] High Complexity PT evaluation 17890  [] PT Re-evaluation 73025  [] Gait training 41612 0 minutes  [] Manual therapy 52083 0 minutes  [x] Therapeutic activities 33254 18 minutes  [] Therapeutic exercises 99400 0 minutes  [] Neuromuscular reeducation 95881 0 minutes     Eboni Barry PT, DPT  [id number]

## 2024-07-08 NOTE — PROGRESS NOTES
Hospital Medicine    Subjective:  pt alert responsive sitting up in chair with brace on caregiver at bedside      Current Facility-Administered Medications:     LORazepam (ATIVAN) injection 0.5 mg, 0.5 mg, IntraVENous, Q6H PRN, Ricky Torres MD, 0.5 mg at 07/08/24 0950    docusate sodium (COLACE) capsule 100 mg, 100 mg, Oral, Nightly, Ricky Torres MD, 100 mg at 07/07/24 2208    melatonin disintegrating tablet 5 mg, 5 mg, Oral, Nightly, Ricky Torres MD, 5 mg at 07/07/24 2210    meloxicam (MOBIC) tablet 7.5 mg, 7.5 mg, Oral, Daily, Ricky Torres MD, 7.5 mg at 07/08/24 0741    sertraline (ZOLOFT) tablet 150 mg, 150 mg, Oral, Nightly, Ricky Torres MD, 150 mg at 07/07/24 2209    hydrOXYzine pamoate (VISTARIL) capsule 50 mg, 50 mg, Oral, TID PRN, Ricky Torres MD, 50 mg at 07/07/24 2209    HYDROcodone-acetaminophen (NORCO) 5-325 MG per tablet 1 tablet, 1 tablet, Oral, Q6H PRN, Ricky Torres MD, 1 tablet at 07/08/24 0742    ondansetron (ZOFRAN) injection 4 mg, 4 mg, IntraVENous, Q6H PRN, Ricky Torres MD    cefdinir (OMNICEF) capsule 300 mg (Patient Supplied), 300 mg, Oral, 2 times per day, Ricky Torres MD, 300 mg at 07/08/24 0741    Objective:    BP (!) 159/62   Pulse 81   Temp 97.8 °F (36.6 °C) (Temporal)   Resp 17   Ht 1.524 m (5')   Wt 61.2 kg (135 lb)   SpO2 95%   BMI 26.37 kg/m²     Heart:  reg  Lungs:  ctab  Abd: + bs soft nontender  Extrem:  w/o edema    CBC with Differential:    Lab Results   Component Value Date/Time    WBC 6.5 07/05/2024 08:41 PM    RBC 4.37 07/05/2024 08:41 PM    HGB 12.4 07/05/2024 08:41 PM    HCT 37.6 07/05/2024 08:41 PM     07/05/2024 08:41 PM    MCV 86.0 07/05/2024 08:41 PM    MCH 28.4 07/05/2024 08:41 PM    MCHC 33.0 07/05/2024 08:41 PM    RDW 13.6 07/05/2024 08:41 PM    LYMPHOPCT 27 07/05/2024 08:41 PM    MONOPCT 12 07/05/2024 08:41 PM    EOSPCT 4 07/05/2024 08:41 PM    BASOPCT 1

## 2024-07-08 NOTE — CARE COORDINATION
07/08/24 Transition of Care: Patient is admitted due to frequent falls. She was found to have a compression fracture at L1 and rib fractures. She transferred to [facility]. N/S consult and order for TLSO brace. Patient is confused and crying out in pain. She is receiving norco/ativan for pain. Call placed to her daughter Tanesha. Daughter states patient has dementia ans is home with 24/7 care givers. She follows with Dr Pastor and her pharmacy is [facility] [place]. The daughter states she does not have a history with snf or homecare. She is requesting a referral to [facility] [place] but they do not take the MMO insurance. The daughter will review the list and get back to  with choices. Pt/Ot are pending for today. Electronically signed by Rosa Wesley RN CM on 7/8/2024 at 12:35 PM    Case Management Assessment  Initial Evaluation    Date/Time of Evaluation: 7/8/2024 12:36 PM  Assessment Completed by: Rosa Wesley RN    If patient is discharged prior to next notation, then this note serves as note for discharge by case management.    Patient Name: Maryellen Zarate                   YOB: 1940  Diagnosis: Closed compression fracture of body of L1 vertebra (HCC) [S32.010A]  Other fracture of first lumbar vertebra, initial encounter for closed fracture (HCC) [S32.018A]                   Date / Time: 7/6/2024  3:18 PM    Patient Admission Status: Inpatient   Readmission Risk (Low < 19, Mod (19-27), High > 27): Readmission Risk Score: 12.6    Current PCP: Jose Pastor, DO  PCP verified by CM? Yes    Chart Reviewed: Yes      History Provided by: Child/Family  Patient Orientation: Sedated    Patient Cognition: Dementia / Early Alzheimer's    Hospitalization in the last 30 days (Readmission):  No    If yes, Readmission Assessment in  Navigator will be completed.    Advance Directives:      Code Status: Full Code   Patient's Primary Decision Maker is: Legal Next of Kin    Primary  deficit, Limited safety awareness, Decreased motivation, Anxiety, and Medical complications    Additional Case Management Notes: see notes     The Plan for Transition of Care is related to the following treatment goals of Closed compression fracture of body of L1 vertebra (HCC) [S32.010A]  Other fracture of first lumbar vertebra, initial encounter for closed fracture (HCC) [S32.018A]    IF APPLICABLE: The Patient and/or patient representative Maryellen and her family were provided with a choice of provider and agrees with the discharge plan. Freedom of choice list with basic dialogue that supports the patient's individualized plan of care/goals and shares the quality data associated with the providers was provided to:     Patient Representative Name: daughter Tanesha       The Patient and/or Patient Representative Agree with the Discharge Plan?  Yes    Rosa Wesley RN  Case Management Department  Ph: [phone]

## 2024-07-08 NOTE — CARE COORDINATION
07/08/24 Update CM Note: Patients daughter Tanesha called with additional choices: [facility]. Referral sent to Dave at [facility]. Electronically signed by Rosa Wesley RN CM on 7/8/2024 at 1:38 PM

## 2024-07-08 NOTE — PROGRESS NOTES
Occupational Therapy    OCCUPATIONAL THERAPY INITIAL EVALUATION    [facility]  [address], [place]        Date:2024                                                  Patient Name: Maryellen Zarate    MRN: [id number]    : 1940    Room: [address] [place] [address]          Evaluating OT: Rosa Mendoza, ALBERTAD, OTR/L; [id number]      Referring Provider:     Dulce Mayfield MD       Specific Provider Orders/Date: OT Eval and Treat 2024      Diagnosis: Closed compression fracture of body of L1 vertebra (HCC) [S32.010A]  Other fracture of first lumbar vertebra, initial encounter for closed fracture (HCC) [S32.018A]       Surgery: none this admission       Pertinent Medical History:  has a past medical history of Allergic rhinitis, Alopecia of scalp, Anxiety, Basal cell carcinoma of nose, Breast CA (HCC), Compression fracture of first lumbar vertebra (HCC), COVID-19 virus infection, Essential hypertension, Hyperlipidemia, Hypertension, Osteoarthritis, Osteoarthritis of both hips, Osteopenia, and Urinary, incontinence, stress female.     Recommended Adaptive Equipment: TBD     Precautions:  Fall Risk, TLSO, spinal, cognition, +alarms     Assessment of current deficits    [x] Functional mobility  [x]ADLs  [x] Strength               [x]Cognition    [x] Functional transfers   [x] IADLs         [x] Safety Awareness   [x]Endurance    [x] Fine Coordination              [x] Balance      [] Vision/perception   []Sensation     []Gross Motor Coordination  [] ROM  [] Delirium                   [] Motor Control     OT PLAN OF CARE   OT POC based on physician orders, patient diagnosis and results of clinical assessment    Frequency/Duration 1-3 days/wk for 2 weeks PRN   Specific OT Treatment Interventions to include:   * Instruction/training on adapted ADL techniques and AE recommendations to increase functional independence within precautions       * Training on  c/o numbness or tingling, light touch assessment appears WFL  Tone: WFL   Edema: unremarkable     Comment: Cleared by RN to see pt. Upon arrival patient supine in bed and agreeable to OT session. At end of session, patient seated in bedside chair with caregiver present with call light and phone within reach, all lines and tubes intact.  Overall patient demonstrated decreased independence and safety during completion of ADL/functional transfer/mobility tasks. Therapist facilitated ADL tasks, functional transfers, functional mobility, bed mobility to address safety awareness, implementation of fall prevention strategies, & functional engagement throughout daily activities. Pt would benefit from continued skilled OT to increase safety and independence with completion of ADL/IADL tasks for functional independence and quality of life.    Treatment: OT treatment provided this date includes:   ADL-  Instruction/training on safety and adapted techniques for completion of ADLs: Therapist facilitated & pt educated on activity modifications/adaptations to promote implementation of fall prevention strategies, EC/WS strategies, & safety awareness throughout ADLs.   Mobility-  Instruction/training on safety and improved independence with bed mobility/functional transfers and functional mobility.   Activity tolerance- Instruction/training on energy conservation/work simplification for completion of ADLs:.     Neuromuscular Reeducation to facilitate balance/righting reactions for increased function with ADLs tasks:    Cognitive retraining -  Cues for safety, sequencing, and problem solving    Skilled positioning/alignment-  Therapist facilitated proper positioning/alignment throughout session to maintain skin/joint integrity & proper body mechanics.   Skilled monitoring of vitals- To maximize safe participation throughout functional activities.       Pt appeared to have tolerated session well and appears

## 2024-07-09 LAB
EKG ATRIAL RATE: 72 BPM
EKG P AXIS: 56 DEGREES
EKG P-R INTERVAL: 154 MS
EKG Q-T INTERVAL: 398 MS
EKG QRS DURATION: 74 MS
EKG QTC CALCULATION (BAZETT): 435 MS
EKG R AXIS: -7 DEGREES
EKG T AXIS: 47 DEGREES
EKG VENTRICULAR RATE: 72 BPM

## 2024-07-09 PROCEDURE — 6370000000 HC RX 637 (ALT 250 FOR IP): Performed by: INTERNAL MEDICINE

## 2024-07-09 PROCEDURE — 6360000002 HC RX W HCPCS: Performed by: INTERNAL MEDICINE

## 2024-07-09 PROCEDURE — 6370000000 HC RX 637 (ALT 250 FOR IP): Performed by: NEUROLOGICAL SURGERY

## 2024-07-09 PROCEDURE — 97530 THERAPEUTIC ACTIVITIES: CPT

## 2024-07-09 PROCEDURE — 1200000000 HC SEMI PRIVATE

## 2024-07-09 RX ORDER — SENNOSIDES A AND B 8.6 MG/1
1 TABLET, FILM COATED ORAL 2 TIMES DAILY
Status: DISCONTINUED | OUTPATIENT
Start: 2024-07-09 | End: 2024-07-11 | Stop reason: HOSPADM

## 2024-07-09 RX ORDER — POLYETHYLENE GLYCOL 3350 17 G/17G
17 POWDER, FOR SOLUTION ORAL DAILY
Status: DISCONTINUED | OUTPATIENT
Start: 2024-07-09 | End: 2024-07-11 | Stop reason: HOSPADM

## 2024-07-09 RX ADMIN — CEFDINIR 300 MG: 300 CAPSULE ORAL at 20:42

## 2024-07-09 RX ADMIN — HYDROCODONE BITARTRATE AND ACETAMINOPHEN 1 TABLET: 5; 325 TABLET ORAL at 19:09

## 2024-07-09 RX ADMIN — HYDROCODONE BITARTRATE AND ACETAMINOPHEN 1 TABLET: 5; 325 TABLET ORAL at 00:37

## 2024-07-09 RX ADMIN — MELOXICAM 7.5 MG: 7.5 TABLET ORAL at 08:48

## 2024-07-09 RX ADMIN — HYDROXYZINE PAMOATE 50 MG: 50 CAPSULE ORAL at 20:51

## 2024-07-09 RX ADMIN — SENNOSIDES 8.6 MG: 8.6 TABLET, FILM COATED ORAL at 14:51

## 2024-07-09 RX ADMIN — Medication 5 MG: at 20:40

## 2024-07-09 RX ADMIN — SENNOSIDES 8.6 MG: 8.6 TABLET, FILM COATED ORAL at 20:40

## 2024-07-09 RX ADMIN — POLYETHYLENE GLYCOL 3350 17 G: 17 POWDER, FOR SOLUTION ORAL at 14:51

## 2024-07-09 RX ADMIN — HYDROCODONE BITARTRATE AND ACETAMINOPHEN 1 TABLET: 5; 325 TABLET ORAL at 06:53

## 2024-07-09 RX ADMIN — CEFDINIR 300 MG: 300 CAPSULE ORAL at 08:48

## 2024-07-09 RX ADMIN — SERTRALINE 150 MG: 50 TABLET, FILM COATED ORAL at 20:40

## 2024-07-09 RX ADMIN — LORAZEPAM 0.5 MG: 2 INJECTION INTRAMUSCULAR; INTRAVENOUS at 23:45

## 2024-07-09 RX ADMIN — DOCUSATE SODIUM 100 MG: 100 CAPSULE, LIQUID FILLED ORAL at 20:40

## 2024-07-09 RX ADMIN — HYDROCODONE BITARTRATE AND ACETAMINOPHEN 1 TABLET: 5; 325 TABLET ORAL at 12:54

## 2024-07-09 ASSESSMENT — PAIN SCALES - GENERAL
PAINLEVEL_OUTOF10: 3
PAINLEVEL_OUTOF10: 5
PAINLEVEL_OUTOF10: 10
PAINLEVEL_OUTOF10: 7
PAINLEVEL_OUTOF10: 7
PAINLEVEL_OUTOF10: 6
PAINLEVEL_OUTOF10: 10

## 2024-07-09 ASSESSMENT — PAIN DESCRIPTION - ONSET: ONSET: ON-GOING

## 2024-07-09 ASSESSMENT — PAIN DESCRIPTION - DESCRIPTORS
DESCRIPTORS: ACHING;DISCOMFORT;SORE
DESCRIPTORS: ACHING;DISCOMFORT;STABBING
DESCRIPTORS: ACHING;DISCOMFORT;STABBING
DESCRIPTORS: ACHING;DISCOMFORT;SORE

## 2024-07-09 ASSESSMENT — PAIN DESCRIPTION - LOCATION
LOCATION: BACK

## 2024-07-09 ASSESSMENT — PAIN DESCRIPTION - FREQUENCY: FREQUENCY: INTERMITTENT

## 2024-07-09 ASSESSMENT — PAIN DESCRIPTION - ORIENTATION
ORIENTATION: POSTERIOR
ORIENTATION: POSTERIOR

## 2024-07-09 ASSESSMENT — PAIN DESCRIPTION - PAIN TYPE
TYPE: ACUTE PAIN

## 2024-07-09 NOTE — PROGRESS NOTES
Occupational Therapy     Date:2024  Patient Name: Maryellen Zarate  MRN: [id number]  : 1940  Room: [address] [place] [address]-A     Completed chart review & attempted to see pt with caregiver politely declining at this time due to eating her lunch. Will attempt to see pt at another time.    JUAN ANTONIO Everett/L [id number]

## 2024-07-09 NOTE — DISCHARGE INSTR - COC
Continuity of Care Form    Patient Name: Maryellen Zarate   :  1940  MRN:  [id number]    Admit date:  2024  Discharge date:  ***    Code Status Order: Full Code   Advance Directives:     Admitting Physician:  Ken Casanova DO  PCP: Jose Pastor DO    Discharging Nurse: ***  Discharging Hospital Unit/Room#: 5216/5216-A  Discharging Unit Phone Number: ***    Emergency Contact:   Extended Emergency Contact Information  Primary Emergency Contact: Tanesha Quigley  Home Phone: [phone]  Relation: Child  Preferred language: English    Past Surgical History:  Past Surgical History:   Procedure Laterality Date    APPENDECTOMY      child    BREAST LUMPECTOMY  2000    COLONOSCOPY      HYSTERECTOMY (CERVIX STATUS UNKNOWN)  1979    [facility]    JOINT REPLACEMENT Right 10/01/2012     Right Hip - [facility]    OTHER SURGICAL HISTORY Left 10/18/2013    full thickness skin graft, repair open nose defect - Dr. Hargrove / Dr. Escamilla       Immunization History:   Immunization History   Administered Date(s) Administered    COVID-19, PFIZER Bivalent, DO NOT Dilute, (age 12y+), IM, 30 mcg/0.3 mL 10/19/2022    COVID-19, PFIZER PURPLE top, DILUTE for use, (age 12 y+), 30mcg/0.3mL 2021, 2021, 2021    Influenza 10/15/2013    Influenza Virus Vaccine 2012, 10/02/2014    Influenza, FLUAD, (age 65 y+), Adjuvanted, 0.5mL 2020, 2021, 10/07/2022, 2023    Influenza, High Dose (Fluzone 65 yrs and older) 10/01/2015, 10/21/2016, 2017, 2018, 2020    Influenza, Triv, inactivated, subunit, adjuvanted, IM (Fluad 65 yrs and older) 2019    Pneumococcal, PCV-13, PREVNAR 13, (age 6w+), IM, 0.5mL 2015    Pneumococcal, PPSV23, PNEUMOVAX 23, (age 2y+), SC/IM, 0.5mL 2008    TD 5LF, TENIVAC, (age 7y+), IM, 0.5mL 2024    Zoster Live (Zostavax) 2014       Active Problems:  Patient Active Problem List   Diagnosis Code    History of breast  cancer Z85.3    Episodic low back pain M54.50    Osteoarthritis of both hips M16.0    Osteopenia M85.80    Allergic rhinitis J30.9    Anxiety F41.9    Contact dermatitis L25.9    Urinary, incontinence, stress female N39.3    White coat syndrome with high blood pressure but without hypertension R03.0    Alopecia of scalp L65.9    Hyperlipidemia E78.5    Bilateral knee pain M25.561, M25.562    Hx of skin cancer, basal cell Z85.828    Hx of squamous cell carcinoma of skin Z85.828    Nuclear senile cataract H25.10    Presbyopia H52.4    Vitreous degeneration H43.819    S/P total knee arthroplasty Z96.659    Bilateral impacted cerumen H61.23    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 ([facility]) G30.1, F02.A4    Closed fracture of ramus of right pubis ([facility]) S32.591A    Urinary incontinence R32    Mild dementia with anxiety ([facility]) F03.A4    Primary osteoarthritis M19.91    Combined hyperlipidemia E78.2    Chronic hypertension I10    Pubic ramus fracture, right, closed, initial encounter ([facility]) S32.591A    COVID-19 U07.1    Ambulatory dysfunction R26.2    Altered mental status R41.82    Colitis K52.9    Fatigue R53.83    Head injury S09.90XA    Scalp laceration S01.01XA    Ataxia R27.0    Acute cystitis without hematuria N30.00    Closed compression fracture of body of L1 vertebra ([facility]) S32.010A    Other fracture of first lumbar vertebra, initial encounter for closed fracture ([facility]) S32.018A       Isolation/Infection:   Isolation            No Isolation          Patient Infection Status       Infection Onset Added Last Indicated Last Indicated By Review Planned Expiration Resolved Resolved By    None active    Resolved    C-diff (Clostridium difficile) 23 Clostridium Difficile Toxin/Antigen   23 Infection     COVID-19 09/08/23 09/08/23 09/10/23 Respiratory Panel, Molecular, with COVID-19 (Restricted: peds pts or suitable admitted adults)   23 Divina Singh    Patient no longer

## 2024-07-09 NOTE — CARE COORDINATION
07/09/24 Update CM Note: Per Dave at [facility] patient is accepted and precert is being initiated today. PASS/jonathan/destination completed. Ambulance form initiated and will need completed at discharge. Call placed to daughter Tanesha and updated her on above.Electronically signed by Rosa Wesley RN CM on 7/9/2024 at 10:29 AM

## 2024-07-09 NOTE — PROGRESS NOTES
Hospital Medicine    Subjective:  pt seen this am alert responsive caregiver and nurse at bedside pt alert responsive      Current Facility-Administered Medications:     senna (SENOKOT) tablet 8.6 mg, 1 tablet, Oral, BID, Dulce Mayfield MD    polyethylene glycol (GLYCOLAX) packet 17 g, 17 g, Oral, Daily, Dulce Mayfield MD    LORazepam (ATIVAN) injection 0.5 mg, 0.5 mg, IntraVENous, Nightly PRN, Ken Casanova DO    docusate sodium (COLACE) capsule 100 mg, 100 mg, Oral, Nightly, Ricky Torres MD, 100 mg at 07/08/24 2004    melatonin disintegrating tablet 5 mg, 5 mg, Oral, Nightly, Ricky Torres MD, 5 mg at 07/08/24 2004    meloxicam (MOBIC) tablet 7.5 mg, 7.5 mg, Oral, Daily, Ricky Torres MD, 7.5 mg at 07/09/24 0848    sertraline (ZOLOFT) tablet 150 mg, 150 mg, Oral, Nightly, Ricky Torres MD, 150 mg at 07/08/24 2005    hydrOXYzine pamoate (VISTARIL) capsule 50 mg, 50 mg, Oral, TID PRN, Ricky Torres MD, 50 mg at 07/08/24 2004    HYDROcodone-acetaminophen (NORCO) 5-325 MG per tablet 1 tablet, 1 tablet, Oral, Q6H PRN, Ricky Torres MD, 1 tablet at 07/09/24 0653    ondansetron (ZOFRAN) injection 4 mg, 4 mg, IntraVENous, Q6H PRN, Ricky Torres MD    cefdinir (OMNICEF) capsule 300 mg (Patient Supplied), 300 mg, Oral, 2 times per day, Ricky Torres MD, 300 mg at 07/09/24 0848    Objective:    /63   Pulse 72   Temp 97.1 °F (36.2 °C) (Temporal)   Resp 16   Ht 1.524 m (5')   Wt 61.2 kg (135 lb)   SpO2 95%   BMI 26.37 kg/m²     Heart:  reg  Lungs:  ctab  Abd: + bs soft nontender  Extrem:  w/o edema    CBC with Differential:    Lab Results   Component Value Date/Time    WBC 6.5 07/05/2024 08:41 PM    RBC 4.37 07/05/2024 08:41 PM    HGB 12.4 07/05/2024 08:41 PM    HCT 37.6 07/05/2024 08:41 PM     07/05/2024 08:41 PM    MCV 86.0 07/05/2024 08:41 PM    MCH 28.4 07/05/2024 08:41 PM    MCHC 33.0 07/05/2024 08:41 PM    RDW 13.6

## 2024-07-09 NOTE — PROGRESS NOTES
Physical Therapy  Physical Therapy Treatment Note     Name: Maryellen Zarate  : 1940  MRN: [id number]      Date of Service: 2024    Evaluating PT:  Eboni Barry, PT, DPT, [id number]    Room #:  5216/5216-A  Diagnosis:  Closed compression fracture of body of L1 vertebra (HCC) [S32.010A]  Other fracture of first lumbar vertebra, initial encounter for closed fracture (HCC) [S32.018A]  PMHx/PSHx:    Past Medical History:   Diagnosis Date    Allergic rhinitis     Alopecia of scalp 2015    Anxiety 2014    Basal cell carcinoma of nose     left side    Breast CA (HCC)     right lumpectomy; DO NOT USE FOR BP,IV,VENIPUNVTURES PER PT    Compression fracture of first lumbar vertebra (HCC) 2024    COVID-19 virus infection 2023    Essential hypertension 2015    Hyperlipidemia     Hypertension     pt states well controlled    Osteoarthritis 10/08/2014    Right knee on x-ray - [facility]    Osteoarthritis of both hips     Osteopenia     Urinary, incontinence, stress female 2015    Follows with Dr Rodriguez. Advised surgery or pessary.      Past Surgical History:   Procedure Laterality Date    APPENDECTOMY      child    BREAST LUMPECTOMY  2000    COLONOSCOPY      HYSTERECTOMY (CERVIX STATUS UNKNOWN)  1979    BR[facility]ON    JOINT REPLACEMENT Right 10/01/2012     Right Hip - [facility]    OTHER SURGICAL HISTORY Left 10/18/2013    full thickness skin graft, repair open nose defect - Dr. Hargrove / Dr. Escamilla      Procedure/Surgery:  none this admission   Precautions:  Fall Risk, TLSO, Spinal Precautions, L 3rd-10th rib fxs, +Alarms, Cognition- h/o dementia  Equipment Needs:  TBD    SUBJECTIVE:    Pt lives alone in a 1 story home with 3 stairs to enter and 2 grab bars.  Bed is on 1 floor and bath is on 1 floor.  Pt ambulated with SPC with assist x1 in community and standard walker PTA. Has  assist at home.    OBJECTIVE:   Initial Evaluation  Date: 24 Treatment  24 Short

## 2024-07-10 PROCEDURE — 6370000000 HC RX 637 (ALT 250 FOR IP): Performed by: INTERNAL MEDICINE

## 2024-07-10 PROCEDURE — 97530 THERAPEUTIC ACTIVITIES: CPT

## 2024-07-10 PROCEDURE — 6370000000 HC RX 637 (ALT 250 FOR IP): Performed by: NEUROLOGICAL SURGERY

## 2024-07-10 PROCEDURE — 1200000000 HC SEMI PRIVATE

## 2024-07-10 RX ORDER — DOCUSATE SODIUM 100 MG/1
100 CAPSULE, LIQUID FILLED ORAL 2 TIMES DAILY
Status: DISCONTINUED | OUTPATIENT
Start: 2024-07-10 | End: 2024-07-11 | Stop reason: HOSPADM

## 2024-07-10 RX ORDER — BISACODYL 10 MG
10 SUPPOSITORY, RECTAL RECTAL DAILY PRN
Status: DISCONTINUED | OUTPATIENT
Start: 2024-07-10 | End: 2024-07-11 | Stop reason: HOSPADM

## 2024-07-10 RX ADMIN — HYDROCODONE BITARTRATE AND ACETAMINOPHEN 1 TABLET: 5; 325 TABLET ORAL at 03:19

## 2024-07-10 RX ADMIN — HYDROCODONE BITARTRATE AND ACETAMINOPHEN 1 TABLET: 5; 325 TABLET ORAL at 22:19

## 2024-07-10 RX ADMIN — DOCUSATE SODIUM 100 MG: 100 CAPSULE, LIQUID FILLED ORAL at 08:46

## 2024-07-10 RX ADMIN — MELOXICAM 7.5 MG: 7.5 TABLET ORAL at 08:15

## 2024-07-10 RX ADMIN — HYDROXYZINE PAMOATE 50 MG: 50 CAPSULE ORAL at 08:16

## 2024-07-10 RX ADMIN — HYDROXYZINE PAMOATE 50 MG: 50 CAPSULE ORAL at 18:09

## 2024-07-10 RX ADMIN — SERTRALINE 150 MG: 50 TABLET, FILM COATED ORAL at 21:14

## 2024-07-10 RX ADMIN — SENNOSIDES 8.6 MG: 8.6 TABLET, FILM COATED ORAL at 21:14

## 2024-07-10 RX ADMIN — HYDROCODONE BITARTRATE AND ACETAMINOPHEN 1 TABLET: 5; 325 TABLET ORAL at 09:28

## 2024-07-10 RX ADMIN — MAGNESIUM HYDROXIDE 30 ML: 400 SUSPENSION ORAL at 08:46

## 2024-07-10 RX ADMIN — POLYETHYLENE GLYCOL 3350 17 G: 17 POWDER, FOR SOLUTION ORAL at 08:14

## 2024-07-10 RX ADMIN — DOCUSATE SODIUM 100 MG: 100 CAPSULE, LIQUID FILLED ORAL at 21:14

## 2024-07-10 RX ADMIN — SENNOSIDES 8.6 MG: 8.6 TABLET, FILM COATED ORAL at 08:15

## 2024-07-10 RX ADMIN — Medication 5 MG: at 21:14

## 2024-07-10 RX ADMIN — HYDROCODONE BITARTRATE AND ACETAMINOPHEN 1 TABLET: 5; 325 TABLET ORAL at 16:12

## 2024-07-10 RX ADMIN — CEFDINIR 300 MG: 300 CAPSULE ORAL at 08:15

## 2024-07-10 ASSESSMENT — PAIN DESCRIPTION - ORIENTATION: ORIENTATION: LOWER

## 2024-07-10 ASSESSMENT — PAIN SCALES - GENERAL
PAINLEVEL_OUTOF10: 0
PAINLEVEL_OUTOF10: 2
PAINLEVEL_OUTOF10: 4
PAINLEVEL_OUTOF10: 2
PAINLEVEL_OUTOF10: 8
PAINLEVEL_OUTOF10: 4
PAINLEVEL_OUTOF10: 0

## 2024-07-10 ASSESSMENT — PAIN DESCRIPTION - LOCATION
LOCATION: BACK
LOCATION: BACK

## 2024-07-10 ASSESSMENT — PAIN SCALES - WONG BAKER: WONGBAKER_NUMERICALRESPONSE: NO HURT

## 2024-07-10 ASSESSMENT — PAIN DESCRIPTION - DESCRIPTORS: DESCRIPTORS: ACHING;SORE

## 2024-07-10 NOTE — PLAN OF CARE
Problem: Discharge Planning  Goal: Discharge to home or other facility with appropriate resources  7/10/2024 0902 by Christie Alanis, RN  Outcome: Progressing  7/10/2024 0239 by Pernell Ivan RN  Outcome: Progressing     Problem: Pain  Goal: Verbalizes/displays adequate comfort level or baseline comfort level  7/10/2024 0902 by Christie Alanis, RN  Outcome: Progressing  7/10/2024 0239 by Pernell Ivan RN  Outcome: Progressing

## 2024-07-10 NOTE — PROGRESS NOTES
Hospital Medicine    Subjective:  pt alert responsive no bm x several days      Current Facility-Administered Medications:     docusate sodium (COLACE) capsule 100 mg, 100 mg, Oral, BID, Ken Casanova DO    magnesium hydroxide (MILK OF MAGNESIA) 400 MG/5ML suspension 30 mL, 30 mL, Oral, Once, Ken Casanova DO    bisacodyl (DULCOLAX) suppository 10 mg, 10 mg, Rectal, Daily PRN, Ken Casanova DO    senna (SENOKOT) tablet 8.6 mg, 1 tablet, Oral, BID, Dulce Mayfield MD, 8.6 mg at 07/10/24 0815    polyethylene glycol (GLYCOLAX) packet 17 g, 17 g, Oral, Daily, Dulce Mayfield MD, 17 g at 07/10/24 0814    melatonin disintegrating tablet 5 mg, 5 mg, Oral, Nightly, Ricky Torres MD, 5 mg at 07/09/24 2040    meloxicam (MOBIC) tablet 7.5 mg, 7.5 mg, Oral, Daily, Ricky Torres MD, 7.5 mg at 07/10/24 0815    sertraline (ZOLOFT) tablet 150 mg, 150 mg, Oral, Nightly, Ricky Torres MD, 150 mg at 07/09/24 2040    hydrOXYzine pamoate (VISTARIL) capsule 50 mg, 50 mg, Oral, TID PRN, Ricky Torres MD, 50 mg at 07/10/24 0816    HYDROcodone-acetaminophen (NORCO) 5-325 MG per tablet 1 tablet, 1 tablet, Oral, Q6H PRN, Ricky Torres MD, 1 tablet at 07/10/24 0319    ondansetron (ZOFRAN) injection 4 mg, 4 mg, IntraVENous, Q6H PRN, Ricky Torres MD    Objective:    BP (!) 150/67   Pulse 80   Temp 97.8 °F (36.6 °C) (Temporal)   Resp 18   Ht 1.524 m (5')   Wt 61.2 kg (135 lb)   SpO2 98%   BMI 26.37 kg/m²     Heart:  reg  Lungs:  ctab  Abd: + bs soft nontender  Extrem:  w/o edema    CBC with Differential:    Lab Results   Component Value Date/Time    WBC 6.5 07/05/2024 08:41 PM    RBC 4.37 07/05/2024 08:41 PM    HGB 12.4 07/05/2024 08:41 PM    HCT 37.6 07/05/2024 08:41 PM     07/05/2024 08:41 PM    MCV 86.0 07/05/2024 08:41 PM    MCH 28.4 07/05/2024 08:41 PM    MCHC 33.0 07/05/2024 08:41 PM    RDW 13.6 07/05/2024 08:41 PM    LYMPHOPCT 27 07/05/2024 08:41 PM

## 2024-07-10 NOTE — PROGRESS NOTES
Physical Therapy  Physical Therapy Treatment    Name: Maryellen Zarate  : 1940  MRN: [id number]      Date of Service: 7/10/2024    Evaluating PT:  Eboni Barry, PT, DPT, [id number]    Room #:  5216/5216-A  Diagnosis:  Closed compression fracture of body of L1 vertebra (HCC) [S32.010A]  Other fracture of first lumbar vertebra, initial encounter for closed fracture (HCC) [S32.018A]  PMHx/PSHx:    Past Medical History:   Diagnosis Date    Allergic rhinitis     Alopecia of scalp 2015    Anxiety 2014    Basal cell carcinoma of nose     left side    Breast CA (HCC)     right lumpectomy; DO NOT USE FOR BP,IV,VENIPUNVTURES PER PT    Compression fracture of first lumbar vertebra (HCC) 2024    COVID-19 virus infection 2023    Essential hypertension 2015    Hyperlipidemia     Hypertension     pt states well controlled    Osteoarthritis 10/08/2014    Right knee on x-ray - [facility]    Osteoarthritis of both hips     Osteopenia     Urinary, incontinence, stress female 2015    Follows with Dr Rodriguez. Advised surgery or pessary.      Past Surgical History:   Procedure Laterality Date    APPENDECTOMY      child    BREAST LUMPECTOMY  2000    COLONOSCOPY      HYSTERECTOMY (CERVIX STATUS UNKNOWN)  1979    BRANDON    JOINT REPLACEMENT Right 10/01/2012     Right Hip - [facility]    OTHER SURGICAL HISTORY Left 10/18/2013    full thickness skin graft, repair open nose defect - Dr. Hargrove / Dr. Escamilla      Procedure/Surgery:  none this admission   Precautions:  Fall Risk, TLSO, Spinal Precautions, L 3rd-10th rib fxs, +Alarms, Cognition- h/o dementia  Equipment Needs:  TBD    SUBJECTIVE:    Pt lives alone in a 1 story home with 3 stairs to enter and 2 grab bars.  Bed is on 1 floor and bath is on 1 floor.  Pt ambulated with SPC with assist x1 in community and standard walker PTA. Has  assist at home.    OBJECTIVE:   Initial Evaluation  Date: 24 Treatment  7/10/24 Short Term/  Long Term   Goals   AM-PAC 6 Clicks 12/24 15/24    Was pt agreeable to Eval/treatment? yes yes    Does pt have pain? No c/o pain  No c/o pain    Bed Mobility  Rolling: Rikki  Supine to sit: MaxA  Sit to supine: NT  Scooting: MaxA Rolling: NT  Supine to sit: NT  Sit to supine: NT  Scooting: NT Rolling: Sup  Supine to sit: Sup  Sit to supine: Sup  Scooting: Sup   Transfers Sit to stand: ModA  Stand to sit: ModA  Stand pivot: ModA WW Sit to stand: Rikki  Stand to sit: Rikki  Stand pivot: Rikki WW Sit to stand: SBA  Stand to sit: SBA  Stand pivot: SBA WW   Ambulation    25'x2 with WW ModA 50 feet with WW Rikki 150+ feet with AAD SBA   Stair negotiation: ascended and descended  NT NT 3+ steps with 2 rail SBA   ROM BUE:  Refer to OT note  BLE:  WFL     Strength BUE:  Refer to OT note  BLE:  WFL     Balance Sitting EOB:  SBA  Dynamic Standing:  MaxA WW Sitting EOB:  NT  Standing:  Rikki WW Sitting EOB:  Sup  Dynamic Standing:  SBA with WW     Pt is A & O x 1 to self, pt agitated during session, but cooperative with mobility  Sensation:  NT  Edema:  none noted      Patient education  Pt educated on role of PT, safety during mobility    Patient response to education:   Pt verbalized understanding Pt demonstrated skill Pt requires further education in this area   yes yes yes     ASSESSMENT:    Comments:  pt seated in chair in room upon entry without TLSO brace on. Donned TLSO prior to further mobility. Pt able to stand and ambulate into bolaños with WW and balance assist. Gait was slow, requiring extra time to complete short distance. Distance limited by fatigue. Pt repositioned in recliner with BLEs elevated at end of session. Education provided to daughter in room on TLSO wear schedule and adherence to precautions.    All needs met and call light in reach. All lines remained intact.     Treatment:  Patient practiced and was instructed in the following treatment:    Transfer Training: Verbal and tactile cueing provided for sequencing

## 2024-07-11 VITALS
RESPIRATION RATE: 16 BRPM | SYSTOLIC BLOOD PRESSURE: 162 MMHG | TEMPERATURE: 97.7 F | BODY MASS INDEX: 26.5 KG/M2 | HEART RATE: 71 BPM | WEIGHT: 135 LBS | OXYGEN SATURATION: 97 % | DIASTOLIC BLOOD PRESSURE: 77 MMHG | HEIGHT: 60 IN

## 2024-07-11 PROCEDURE — 97535 SELF CARE MNGMENT TRAINING: CPT

## 2024-07-11 PROCEDURE — 6370000000 HC RX 637 (ALT 250 FOR IP): Performed by: NEUROLOGICAL SURGERY

## 2024-07-11 PROCEDURE — 6370000000 HC RX 637 (ALT 250 FOR IP): Performed by: INTERNAL MEDICINE

## 2024-07-11 RX ORDER — HYDROCODONE BITARTRATE AND ACETAMINOPHEN 5; 325 MG/1; MG/1
1 TABLET ORAL EVERY 6 HOURS PRN
Qty: 20 TABLET | Refills: 0 | Status: SHIPPED | OUTPATIENT
Start: 2024-07-11 | End: 2024-07-16

## 2024-07-11 RX ORDER — DOCUSATE SODIUM 100 MG/1
100 CAPSULE, LIQUID FILLED ORAL 2 TIMES DAILY
COMMUNITY
Start: 2024-07-11

## 2024-07-11 RX ADMIN — HYDROXYZINE PAMOATE 50 MG: 50 CAPSULE ORAL at 08:33

## 2024-07-11 RX ADMIN — POLYETHYLENE GLYCOL 3350 17 G: 17 POWDER, FOR SOLUTION ORAL at 08:27

## 2024-07-11 RX ADMIN — MELOXICAM 7.5 MG: 7.5 TABLET ORAL at 08:27

## 2024-07-11 RX ADMIN — HYDROCODONE BITARTRATE AND ACETAMINOPHEN 1 TABLET: 5; 325 TABLET ORAL at 07:02

## 2024-07-11 RX ADMIN — DOCUSATE SODIUM 100 MG: 100 CAPSULE, LIQUID FILLED ORAL at 08:27

## 2024-07-11 RX ADMIN — MAJOR MAGNESIUM CITRATE ORAL SOLUTION - LEMON 296 ML: 1.75 LIQUID ORAL at 08:26

## 2024-07-11 RX ADMIN — SENNOSIDES 8.6 MG: 8.6 TABLET, FILM COATED ORAL at 08:27

## 2024-07-11 ASSESSMENT — PAIN SCALES - GENERAL: PAINLEVEL_OUTOF10: 2

## 2024-07-11 NOTE — CARE COORDINATION
07/11/24 Update CM Note: Received a call from the daughter regarding a change in the discharge plan. She wishes to take the patient home with [facility]. She is asking to be discharged today. Discharge order is in epic. She states patient has 24/7 care in the home. Will notify [facility] when they open at 9:00am to contact patients daughter, Tanesha Quigley. Electronically signed by Rosa Wesley RN CM on 7/11/2024 at 8:22 AM

## 2024-07-11 NOTE — PROGRESS NOTES
[facility] Medicine    Subjective:  pt alert conversive no bm x several days      Current Facility-Administered Medications:     magnesium citrate solution 296 mL, 296 mL, Oral, Once, Ken Casanova DO    docusate sodium (COLACE) capsule 100 mg, 100 mg, Oral, BID, Ken Casanova, , 100 mg at 07/10/24 2114    bisacodyl (DULCOLAX) suppository 10 mg, 10 mg, Rectal, Daily PRN, Ken Casanova DO    senna (SENOKOT) tablet 8.6 mg, 1 tablet, Oral, BID, Dulce Mayfield MD, 8.6 mg at 07/10/24 2114    polyethylene glycol (GLYCOLAX) packet 17 g, 17 g, Oral, Daily, Dulce Mayfield MD, 17 g at 07/10/24 0814    melatonin disintegrating tablet 5 mg, 5 mg, Oral, Nightly, Ricky Torres MD, 5 mg at 07/10/24 2114    meloxicam (MOBIC) tablet 7.5 mg, 7.5 mg, Oral, Daily, Ricky Torres MD, 7.5 mg at 07/10/24 0815    sertraline (ZOLOFT) tablet 150 mg, 150 mg, Oral, Nightly, Ricky Torres MD, 150 mg at 07/10/24 2114    hydrOXYzine pamoate (VISTARIL) capsule 50 mg, 50 mg, Oral, TID PRN, Ricky Torres MD, 50 mg at 07/10/24 1809    HYDROcodone-acetaminophen (NORCO) 5-325 MG per tablet 1 tablet, 1 tablet, Oral, Q6H PRN, Ricky Torres MD, 1 tablet at 07/11/24 0702    ondansetron (ZOFRAN) injection 4 mg, 4 mg, IntraVENous, Q6H PRN, Ricky Torres MD    Objective:    BP (!) 182/69   Pulse 68   Temp 97.9 °F (36.6 °C) (Temporal)   Resp 16   Ht 1.524 m (5')   Wt 61.2 kg (135 lb)   SpO2 96%   BMI 26.37 kg/m²     Heart:  reg  Lungs:  ctab  Abd: + bs soft nondistended  Extrem:  w/o edema    CBC with Differential:    Lab Results   Component Value Date/Time    WBC 6.5 07/05/2024 08:41 PM    RBC 4.37 07/05/2024 08:41 PM    HGB 12.4 07/05/2024 08:41 PM    HCT 37.6 07/05/2024 08:41 PM     07/05/2024 08:41 PM    MCV 86.0 07/05/2024 08:41 PM    MCH 28.4 07/05/2024 08:41 PM    MCHC 33.0 07/05/2024 08:41 PM    RDW 13.6 07/05/2024 08:41 PM    LYMPHOPCT 27 07/05/2024 08:41 PM    MONOPCT 12

## 2024-07-11 NOTE — CARE COORDINATION
07/11/24 Update CM Note: Patient is approved by insurance to go to [facility]. Spoke with daughter who advises CM to cancel referral as she is taking patient home. She will call Dr Pastor and get Palliative care orders once she is at home. Spoke with Jasmin Walker at [facility] who states she will follow up once pcp office gives referral orders to main office at [facility]. Electronically signed by Rosa Wesley RN CM on 7/11/2024 at 9:36 AM

## 2024-07-11 NOTE — PROGRESS NOTES
Pt and daughter given and explained all discharge instructions, all questions answered to satisfaction. Stressed the importance of all medication administration and doctor follow ups. Pt and daughter in understanding daughter and caregiver to take down to private car for discharge

## 2024-07-11 NOTE — PROGRESS NOTES
Occupational Therapy  OT BEDSIDE TREATMENT NOTE   [facility]  [address], [place]       Date:2024  Patient Name: Maryellen Zarate  MRN: [id number]  : 1940  Room: [address] [place] [address]-A     Per OT Eval:    Evaluating OT: Rosa Mendoza, ALBERTAD, OTR/L; [id number]       Referring Provider:     Dulce Mayfield MD        Specific Provider Orders/Date: OT Eval and Treat 2024       Diagnosis: Closed compression fracture of body of L1 vertebra (HCC) [S32.010A]  Other fracture of first lumbar vertebra, initial encounter for closed fracture (HCC) [S32.018A]       Surgery: none this admission        Pertinent Medical History:  has a past medical history of Allergic rhinitis, Alopecia of scalp, Anxiety, Basal cell carcinoma of nose, Breast CA (HCC), Compression fracture of first lumbar vertebra (HCC), COVID-19 virus infection, Essential hypertension, Hyperlipidemia, Hypertension, Osteoarthritis, Osteoarthritis of both hips, Osteopenia, and Urinary, incontinence, stress female.     Recommended Adaptive Equipment: TBD      Precautions:  Fall Risk, TLSO, spinal, cognition, +alarms      Assessment of current deficits    [x] Functional mobility            [x]ADLs           [x] Strength                  [x]Cognition    [x] Functional transfers          [x] IADLs         [x] Safety Awareness   [x]Endurance    [x] Fine Coordination                         [x] Balance      [] Vision/perception   []Sensation      []Gross Motor Coordination             [] ROM           [] Delirium                   [] Motor Control      OT PLAN OF CARE   OT POC based on physician orders, patient diagnosis and results of clinical assessment     Frequency/Duration 1-3 days/wk for 2 weeks PRN   Specific OT Treatment Interventions to include:   * Instruction/training on adapted ADL techniques and AE recommendations to increase functional independence within precautions       * Training  precautions and brace               Sequencing: Fair- with ADL tasks               Problem solving:  Poor              Judgement/safety:  Poor                Functional Assessment:  AM-PAC Daily Activity Raw Score: 13/24    Initial Eval Status  Date: 07/08/24 Treatment Status  Date:  7/11/24 STGs = LTGs  Time frame: 10-14 days   Feeding Max A, poor sequencing   SBA  Simulated, recommending all meals up in chair  Modified [place]    Grooming Max A   To comb hair seated at eob Min A  Washing face, hands with cues, along with brushing hair  Modified [place]    UB Dressing modA with front opening gown  Mod A  Doff/frankie pull over shirt with cues for sequences   Dep with donning TLSO seated   (Due to pt was up seated on commode upon arrival)  Extensive education was given to caregiver & daughter regarding importance of wearing TLSO brace per dr orders   Supervision    LB Dressing Max A  Dep for socks, max A for donning brief seated on toilet, decreased sequencing and understanding of spinal precautions   Max A  Doff/donning brief & lounge pants   With instruction on maintaining spinal precautions, limited understanding with use of AE  Pt has caregivers 24/7 Supervision    Bathing Max A  UB: Min A  To wash back  LB: Mod A  Assist with lower legs & buttock area seated/standing   Supervision    Toileting Max A  For yoon hygiene and clothing management standing at FWW, uses toilet   Min A  Using regular commode, pt able to perform anterior hygiene  Supervision    Bed Mobility  Log Roll: [place]  Supine to sit: Maximal Assist   Sit to supine: NT NT Supine to sit: Supervision   Sit to supine: Supervision    Functional Transfers Sit to stand:modA to FWW   Stand to sit:modA   Stand pivot: modA with FWW  Commode: max A to stand   Min A  Sit < > Stand  Stand pivot with walker  Cues for technique & hand placements  Sit to stand:Supervision    Stand to sit:Supervision   Stand pivot: Supervision   Commode: Supervision

## 2024-07-12 ENCOUNTER — CARE COORDINATION (OUTPATIENT)
Dept: CARE COORDINATION | Age: 84
End: 2024-07-12

## 2024-07-12 ENCOUNTER — TELEPHONE (OUTPATIENT)
Dept: PRIMARY CARE CLINIC | Age: 84
End: 2024-07-12

## 2024-07-12 DIAGNOSIS — S32.010A CLOSED COMPRESSION FRACTURE OF BODY OF L1 VERTEBRA (HCC): Primary | ICD-10-CM

## 2024-07-12 DIAGNOSIS — R53.1 WEAKNESS GENERALIZED: ICD-10-CM

## 2024-07-12 DIAGNOSIS — G30.1 MILD LATE ONSET ALZHEIMER'S DEMENTIA WITH ANXIETY (HCC): Primary | ICD-10-CM

## 2024-07-12 DIAGNOSIS — S22.41XS CLOSED FRACTURE OF MULTIPLE RIBS OF RIGHT SIDE, SEQUELA: ICD-10-CM

## 2024-07-12 DIAGNOSIS — F02.A4 MILD LATE ONSET ALZHEIMER'S DEMENTIA WITH ANXIETY (HCC): Primary | ICD-10-CM

## 2024-07-12 DIAGNOSIS — S32.010S COMPRESSION FRACTURE OF L1 VERTEBRA, SEQUELA: ICD-10-CM

## 2024-07-12 PROCEDURE — 1111F DSCHRG MED/CURRENT MED MERGE: CPT | Performed by: FAMILY MEDICINE

## 2024-07-12 NOTE — PROGRESS NOTES
Recently discharge to home after compression fx  L1 and rib fractures multiple. Daughter wanting Palliative care. No surgery done.

## 2024-07-12 NOTE — CARE COORDINATION
Care Transitions Note    Initial Call - Call within 2 business days of discharge: Yes    Patient Current Location:  Home: [address] [place] [address]    Care Transition Nurse contacted the family, DTR (Tanesha)  by telephone to perform post hospital discharge assessment, verified name and  as identifiers. Provided introduction to self, and explanation of the Care Transition Nurse role.     Patient: Maryellen Zarate    Patient : 1940   MRN: [id number]    Reason for Admission: Closed compression fracture of body of L1 vertebra  Discharge Date: 24  RURS: Readmission Risk Score: 11.8      Last Discharge Facility       Date Complaint Diagnosis Description Type Department Provider    24  Other fracture of first lumbar vertebra, initial encounter for closed fracture (HCC) Admission (Discharged) [facility] Ken Casanova, DO            Was this an external facility discharge? No    Additional needs identified to be addressed with provider   No needs identified             Method of communication with provider: none.    Patients top risk factors for readmission: functional cognitive ability, functional physical ability, falls, medical condition-HTN, and utilization of services    Interventions to address risk factors:   Referrals: CTN confirmed PCP placed referral for Palliative Care and per DTR (Tanesha) awaiting to hear from [facility] regarding SOC      Care Summary Note: Spoke with patient DTR (Tanesha) on communication release regarding initial REINALDO/hospital discharge (low risk) follow up.     Tanesha states patient continues with pain to low back r/t L1 fracture. States patient taking prescribed Norco for pain relief. Denies paint having any numbness, tingling or loss of sensation to LE. Denies any loss of bowel/bladder function. States patient keeps taking brace of back and not wearing.     Tanesha states patient is w/c bound and utilizes walker for walking short distance or transfers. States

## 2024-07-15 DIAGNOSIS — S32.010D COMPRESSION FRACTURE OF L1 VERTEBRA WITH ROUTINE HEALING, SUBSEQUENT ENCOUNTER: ICD-10-CM

## 2024-07-15 DIAGNOSIS — G31.1 SENILE DEGENERATION OF BRAIN (HCC): ICD-10-CM

## 2024-07-15 DIAGNOSIS — R53.1 WEAKNESS GENERALIZED: Primary | ICD-10-CM

## 2024-07-15 DIAGNOSIS — G30.1 MILD LATE ONSET ALZHEIMER'S DEMENTIA WITH ANXIETY (HCC): ICD-10-CM

## 2024-07-15 DIAGNOSIS — F02.A4 MILD LATE ONSET ALZHEIMER'S DEMENTIA WITH ANXIETY (HCC): ICD-10-CM

## 2024-07-15 NOTE — PROGRESS NOTES
Hospice referral made due to her declining condition with Alzheimer's and senile degeneration of brain.

## 2024-07-25 ENCOUNTER — CARE COORDINATION (OUTPATIENT)
Dept: CARE COORDINATION | Age: 84
End: 2024-07-25

## 2024-07-25 NOTE — CARE COORDINATION
Care Transitions Note    Final Call     Patient Current Location:  Home: [address] [place] [id number]    Care Transition Nurse contacted the family, DTR (Tanesha)  by telephone. Verified name and  as identifiers.      Care Summary Note: Spoke with newly enrolled in hospice services with [facility] since last CTN call. Tanesha states she has canceled neurosurgery follow up appt. States patient not wearing back brace for closed compression fracture of L1 vertebra. Tanesha verbalizes she is grateful for the addition support Hospice offers in taking care of patient at home.     Tanesha denies any needs at this time and in agreement to final call. CTN signing off for care Transition.       Maira Frank, APRN

## 2024-11-06 ENCOUNTER — APPOINTMENT (OUTPATIENT)
Dept: CT IMAGING | Age: 84
End: 2024-11-06
Payer: MEDICARE

## 2024-11-06 ENCOUNTER — HOSPITAL ENCOUNTER (EMERGENCY)
Age: 84
Discharge: HOME | End: 2024-11-06
Attending: EMERGENCY MEDICINE
Payer: MEDICARE

## 2024-11-06 ENCOUNTER — APPOINTMENT (OUTPATIENT)
Dept: GENERAL RADIOLOGY | Age: 84
End: 2024-11-06
Payer: MEDICARE

## 2024-11-06 VITALS
HEART RATE: 88 BPM | RESPIRATION RATE: 16 BRPM | DIASTOLIC BLOOD PRESSURE: 74 MMHG | OXYGEN SATURATION: 94 % | SYSTOLIC BLOOD PRESSURE: 160 MMHG | TEMPERATURE: 98.3 F

## 2024-11-06 DIAGNOSIS — R41.82 ALTERED MENTAL STATUS, UNSPECIFIED ALTERED MENTAL STATUS TYPE: Primary | ICD-10-CM

## 2024-11-06 DIAGNOSIS — F03.C11 SEVERE DEMENTIA WITH AGITATION, UNSPECIFIED DEMENTIA TYPE (HCC): ICD-10-CM

## 2024-11-06 LAB
ALBUMIN SERPL-MCNC: 4.2 G/DL (ref 3.5–5.2)
ALP SERPL-CCNC: 106 U/L (ref 35–104)
ALT SERPL-CCNC: 14 U/L (ref 0–32)
AMORPH SED URNS QL MICRO: PRESENT
AMPHET UR QL SCN: NEGATIVE
ANION GAP SERPL CALCULATED.3IONS-SCNC: 10 MMOL/L (ref 7–16)
APAP SERPL-MCNC: <5 UG/ML (ref 10–30)
AST SERPL-CCNC: 20 U/L (ref 0–31)
BACTERIA URNS QL MICRO: ABNORMAL
BARBITURATES UR QL SCN: NEGATIVE
BASOPHILS # BLD: 0.03 K/UL (ref 0–0.2)
BASOPHILS NFR BLD: 1 % (ref 0–2)
BENZODIAZ UR QL: POSITIVE
BILIRUB SERPL-MCNC: 0.3 MG/DL (ref 0–1.2)
BILIRUB UR QL STRIP: NEGATIVE
BUN SERPL-MCNC: 22 MG/DL (ref 6–23)
BUPRENORPHINE UR QL: NEGATIVE
CALCIUM SERPL-MCNC: 9 MG/DL (ref 8.6–10.2)
CANNABINOIDS UR QL SCN: NEGATIVE
CHLORIDE SERPL-SCNC: 101 MMOL/L (ref 98–107)
CLARITY UR: ABNORMAL
CO2 SERPL-SCNC: 31 MMOL/L (ref 22–29)
COCAINE UR QL SCN: NEGATIVE
COLOR UR: YELLOW
CREAT SERPL-MCNC: 0.7 MG/DL (ref 0.5–1)
EKG ATRIAL RATE: 69 BPM
EKG P AXIS: 52 DEGREES
EKG P-R INTERVAL: 152 MS
EKG Q-T INTERVAL: 414 MS
EKG QRS DURATION: 72 MS
EKG QTC CALCULATION (BAZETT): 443 MS
EKG R AXIS: -5 DEGREES
EKG T AXIS: 50 DEGREES
EKG VENTRICULAR RATE: 69 BPM
EOSINOPHIL # BLD: 0.03 K/UL (ref 0.05–0.5)
EOSINOPHILS RELATIVE PERCENT: 1 % (ref 0–6)
EPI CELLS #/AREA URNS HPF: ABNORMAL /HPF
ERYTHROCYTE [DISTWIDTH] IN BLOOD BY AUTOMATED COUNT: 14 % (ref 11.5–15)
ETHANOLAMINE SERPL-MCNC: <10 MG/DL (ref 0–0.08)
FENTANYL UR QL: NEGATIVE
FLUAV RNA RESP QL NAA+PROBE: NOT DETECTED
FLUBV RNA RESP QL NAA+PROBE: NOT DETECTED
GFR, ESTIMATED: 85 ML/MIN/1.73M2
GLUCOSE SERPL-MCNC: 122 MG/DL (ref 74–99)
GLUCOSE UR STRIP-MCNC: NEGATIVE MG/DL
HCT VFR BLD AUTO: 41 % (ref 34–48)
HGB BLD-MCNC: 13.2 G/DL (ref 11.5–15.5)
HGB UR QL STRIP.AUTO: NEGATIVE
IMM GRANULOCYTES # BLD AUTO: <0.03 K/UL (ref 0–0.58)
IMM GRANULOCYTES NFR BLD: 0 % (ref 0–5)
KETONES UR STRIP-MCNC: ABNORMAL MG/DL
LEUKOCYTE ESTERASE UR QL STRIP: ABNORMAL
LYMPHOCYTES NFR BLD: 0.74 K/UL (ref 1.5–4)
LYMPHOCYTES RELATIVE PERCENT: 15 % (ref 20–42)
MAGNESIUM SERPL-MCNC: 1.9 MG/DL (ref 1.6–2.6)
MCH RBC QN AUTO: 28 PG (ref 26–35)
MCHC RBC AUTO-ENTMCNC: 32.2 G/DL (ref 32–34.5)
MCV RBC AUTO: 86.9 FL (ref 80–99.9)
METHADONE UR QL: NEGATIVE
MONOCYTES NFR BLD: 0.3 K/UL (ref 0.1–0.95)
MONOCYTES NFR BLD: 6 % (ref 2–12)
NEUTROPHILS NFR BLD: 78 % (ref 43–80)
NEUTS SEG NFR BLD: 3.95 K/UL (ref 1.8–7.3)
NITRITE UR QL STRIP: NEGATIVE
OPIATES UR QL SCN: POSITIVE
OXYCODONE UR QL SCN: NEGATIVE
PCP UR QL SCN: NEGATIVE
PH UR STRIP: 8.5 [PH] (ref 5–9)
PLATELET # BLD AUTO: 214 K/UL (ref 130–450)
PMV BLD AUTO: 8.3 FL (ref 7–12)
POTASSIUM SERPL-SCNC: 4.1 MMOL/L (ref 3.5–5)
PROT SERPL-MCNC: 7.4 G/DL (ref 6.4–8.3)
PROT UR STRIP-MCNC: 30 MG/DL
RBC # BLD AUTO: 4.72 M/UL (ref 3.5–5.5)
RBC #/AREA URNS HPF: ABNORMAL /HPF
SALICYLATES SERPL-MCNC: <0.3 MG/DL (ref 0–30)
SARS-COV-2 RNA RESP QL NAA+PROBE: NOT DETECTED
SODIUM SERPL-SCNC: 142 MMOL/L (ref 132–146)
SOURCE: NORMAL
SP GR UR STRIP: 1.01 (ref 1–1.03)
SPECIMEN DESCRIPTION: NORMAL
TEST INFORMATION: ABNORMAL
TOXIC TRICYCLIC SC,BLOOD: NEGATIVE
TROPONIN I SERPL HS-MCNC: 10 NG/L (ref 0–9)
TROPONIN I SERPL HS-MCNC: 10 NG/L (ref 0–9)
UROBILINOGEN UR STRIP-ACNC: 0.2 EU/DL (ref 0–1)
WBC #/AREA URNS HPF: ABNORMAL /HPF
WBC OTHER # BLD: 5.1 K/UL (ref 4.5–11.5)

## 2024-11-06 PROCEDURE — 85025 COMPLETE CBC W/AUTO DIFF WBC: CPT

## 2024-11-06 PROCEDURE — 99285 EMERGENCY DEPT VISIT HI MDM: CPT

## 2024-11-06 PROCEDURE — 80179 DRUG ASSAY SALICYLATE: CPT

## 2024-11-06 PROCEDURE — 84484 ASSAY OF TROPONIN QUANT: CPT

## 2024-11-06 PROCEDURE — 6370000000 HC RX 637 (ALT 250 FOR IP): Performed by: EMERGENCY MEDICINE

## 2024-11-06 PROCEDURE — 81001 URINALYSIS AUTO W/SCOPE: CPT

## 2024-11-06 PROCEDURE — 93010 ELECTROCARDIOGRAM REPORT: CPT | Performed by: INTERNAL MEDICINE

## 2024-11-06 PROCEDURE — 6360000002 HC RX W HCPCS

## 2024-11-06 PROCEDURE — 80307 DRUG TEST PRSMV CHEM ANLYZR: CPT

## 2024-11-06 PROCEDURE — 83735 ASSAY OF MAGNESIUM: CPT

## 2024-11-06 PROCEDURE — 80053 COMPREHEN METABOLIC PANEL: CPT

## 2024-11-06 PROCEDURE — 71045 X-RAY EXAM CHEST 1 VIEW: CPT

## 2024-11-06 PROCEDURE — 93005 ELECTROCARDIOGRAM TRACING: CPT

## 2024-11-06 PROCEDURE — 80143 DRUG ASSAY ACETAMINOPHEN: CPT

## 2024-11-06 PROCEDURE — 70450 CT HEAD/BRAIN W/O DYE: CPT

## 2024-11-06 PROCEDURE — 87636 SARSCOV2 & INF A&B AMP PRB: CPT

## 2024-11-06 PROCEDURE — 96374 THER/PROPH/DIAG INJ IV PUSH: CPT

## 2024-11-06 PROCEDURE — G0480 DRUG TEST DEF 1-7 CLASSES: HCPCS

## 2024-11-06 RX ORDER — ACETAMINOPHEN 325 MG/1
650 TABLET ORAL EVERY 6 HOURS PRN
Status: CANCELLED | OUTPATIENT
Start: 2024-11-06

## 2024-11-06 RX ORDER — POTASSIUM CHLORIDE 7.45 MG/ML
10 INJECTION INTRAVENOUS PRN
Status: CANCELLED | OUTPATIENT
Start: 2024-11-06

## 2024-11-06 RX ORDER — HYDROXYZINE PAMOATE 25 MG/1
25 CAPSULE ORAL 3 TIMES DAILY PRN
Status: CANCELLED | OUTPATIENT
Start: 2024-11-06

## 2024-11-06 RX ORDER — SODIUM CHLORIDE 0.9 % (FLUSH) 0.9 %
5-40 SYRINGE (ML) INJECTION PRN
Status: CANCELLED | OUTPATIENT
Start: 2024-11-06

## 2024-11-06 RX ORDER — CEFDINIR 300 MG/1
300 CAPSULE ORAL 2 TIMES DAILY
Qty: 14 CAPSULE | Refills: 0 | Status: SHIPPED | OUTPATIENT
Start: 2024-11-06 | End: 2024-11-13

## 2024-11-06 RX ORDER — ACETAMINOPHEN 650 MG/1
650 SUPPOSITORY RECTAL EVERY 6 HOURS PRN
Status: CANCELLED | OUTPATIENT
Start: 2024-11-06

## 2024-11-06 RX ORDER — POLYETHYLENE GLYCOL 3350 17 G/17G
17 POWDER, FOR SOLUTION ORAL DAILY PRN
Status: CANCELLED | OUTPATIENT
Start: 2024-11-06

## 2024-11-06 RX ORDER — MAGNESIUM SULFATE IN WATER 40 MG/ML
2000 INJECTION, SOLUTION INTRAVENOUS PRN
Status: CANCELLED | OUTPATIENT
Start: 2024-11-06

## 2024-11-06 RX ORDER — SODIUM CHLORIDE 0.9 % (FLUSH) 0.9 %
5-40 SYRINGE (ML) INJECTION EVERY 12 HOURS SCHEDULED
Status: CANCELLED | OUTPATIENT
Start: 2024-11-06

## 2024-11-06 RX ORDER — POTASSIUM CHLORIDE 1500 MG/1
40 TABLET, EXTENDED RELEASE ORAL PRN
Status: CANCELLED | OUTPATIENT
Start: 2024-11-06

## 2024-11-06 RX ORDER — LORAZEPAM 1 MG/1
1 TABLET ORAL ONCE
Status: COMPLETED | OUTPATIENT
Start: 2024-11-06 | End: 2024-11-06

## 2024-11-06 RX ORDER — ONDANSETRON 2 MG/ML
4 INJECTION INTRAMUSCULAR; INTRAVENOUS EVERY 6 HOURS PRN
Status: CANCELLED | OUTPATIENT
Start: 2024-11-06

## 2024-11-06 RX ORDER — ENOXAPARIN SODIUM 100 MG/ML
40 INJECTION SUBCUTANEOUS DAILY
Status: CANCELLED | OUTPATIENT
Start: 2024-11-06

## 2024-11-06 RX ORDER — DOCUSATE SODIUM 100 MG/1
100 CAPSULE, LIQUID FILLED ORAL 2 TIMES DAILY
Status: CANCELLED | OUTPATIENT
Start: 2024-11-06

## 2024-11-06 RX ORDER — DIVALPROEX SODIUM 125 MG/1
250 CAPSULE, COATED PELLETS ORAL EVERY 12 HOURS SCHEDULED
Status: CANCELLED | OUTPATIENT
Start: 2024-11-06

## 2024-11-06 RX ORDER — ONDANSETRON 4 MG/1
4 TABLET, ORALLY DISINTEGRATING ORAL EVERY 8 HOURS PRN
Status: CANCELLED | OUTPATIENT
Start: 2024-11-06

## 2024-11-06 RX ORDER — SODIUM CHLORIDE 9 MG/ML
INJECTION, SOLUTION INTRAVENOUS PRN
Status: CANCELLED | OUTPATIENT
Start: 2024-11-06

## 2024-11-06 RX ORDER — ONDANSETRON 2 MG/ML
4 INJECTION INTRAMUSCULAR; INTRAVENOUS ONCE
Status: COMPLETED | OUTPATIENT
Start: 2024-11-06 | End: 2024-11-06

## 2024-11-06 RX ADMIN — ONDANSETRON 4 MG: 2 INJECTION INTRAMUSCULAR; INTRAVENOUS at 09:18

## 2024-11-06 RX ADMIN — LORAZEPAM 1 MG: 1 TABLET ORAL at 14:26

## 2024-11-06 RX ADMIN — LORAZEPAM 1 MG: 1 TABLET ORAL at 17:59

## 2024-11-06 ASSESSMENT — PAIN - FUNCTIONAL ASSESSMENT: PAIN_FUNCTIONAL_ASSESSMENT: NONE - DENIES PAIN

## 2024-11-06 NOTE — ED PROVIDER NOTES
Department of Emergency Medicine   ED Provider Note  Admit Date/RoomTime: 11/6/2024  6:55 AM  ED Room: [facility]14B-          History of Present Illness:  11/6/24, Time: 7:10 AM EST      Patient is a 84 y.o. female presents with a chief complaint of AMS.  This has been occurring for the past.    Additional history obtained from daughter at bedside.  She notes 2 days ago patient's medications were changed and she has not slept in the past 2 days.  Notes she has been having increasing altered mental status, agitation and aggression.  Notes she has been having hallucinations seeming to be talking at the walls and punching and kicking things at home.  Patient does live at home with her daughter.  She notes they have nurses that come and help a couple times a week.  She is on hospice care.  Daughter notes she is becoming too difficult to take care of her of at home given the increased aggression, agitation, and difficulty ambulating.    Review of Systems:     Pertinent positives and negatives are stated within HPI.      --------------------------------------------- PAST HISTORY ---------------------------------------------  Past Medical History:  has a past medical history of Allergic rhinitis, Alopecia of scalp, Anxiety, Basal cell carcinoma of nose, Breast CA (HCC), Compression fracture of first lumbar vertebra (HCC), COVID-19 virus infection, Essential hypertension, Hyperlipidemia, Hypertension, Osteoarthritis, Osteoarthritis of both hips, Osteopenia, and Urinary, incontinence, stress female.    Past Surgical History:  has a past surgical history that includes Appendectomy; Breast lumpectomy (04/2000); Hysterectomy (1979); Colonoscopy (2000); other surgical history (Left, 10/18/2013); and joint replacement (Right, 10/01/2012).    Social History:  reports that she has never smoked. She has never used smokeless tobacco. She reports that she does not currently use alcohol. She reports that she does not use drugs.    Family

## 2024-11-06 NOTE — CARE COORDINATION
Social Work /Transition of Care:    Pt presents to the ED secondary to altered mental status from home.  Pt has hx of dementia and alert and oriented x1.    ALEISHA met with pt and her daughter.  Pt's daughter reports pt has hx of dementia and is currently active with [facility].  Pt's daughter states pt has 24hr caregivers at home.  Pt's daughter states plan was for pt to go to [facility] with [facility] hospice.  ALEISHA spoke to Regina with [facility] who confirms pt has been accepted at [facility] but they will not have a bed available until tomorrow morning.  ALEISHA informed pt's daughter of this and pt's daughter states she will take pt home until tomorrow morning.  Regina with [facility] aware.  ALEISHA also notified Nia with [facility] hospice.  ALEISHA arranged transport home via [facility].  ETA 8pm  Ambulance form complete and in pt's chart.  RN aware.